# Patient Record
Sex: FEMALE | Race: WHITE | Employment: OTHER | URBAN - METROPOLITAN AREA
[De-identification: names, ages, dates, MRNs, and addresses within clinical notes are randomized per-mention and may not be internally consistent; named-entity substitution may affect disease eponyms.]

---

## 2019-03-06 LAB
CREATININE: 1 MG/DL
POTASSIUM (K+): 4.1 MMOL/L

## 2019-08-19 ENCOUNTER — TELEPHONE (OUTPATIENT)
Dept: PRIMARY CARE CLINIC | Age: 79
End: 2019-08-19

## 2019-08-19 RX ORDER — DICLOFENAC POTASSIUM 50 MG/1
TABLET, FILM COATED ORAL
COMMUNITY
Start: 2017-03-24 | End: 2019-08-26 | Stop reason: SDUPTHER

## 2019-08-22 ENCOUNTER — TELEPHONE (OUTPATIENT)
Dept: PRIMARY CARE CLINIC | Age: 79
End: 2019-08-22

## 2019-08-22 NOTE — TELEPHONE ENCOUNTER
Left message for pt to return call.   Need to review her medications and see which ones she's in need of and verify pharmacy

## 2019-08-26 RX ORDER — AMILORIDE HYDROCHLORIDE AND HYDROCHLOROTHIAZIDE 5; 50 MG/1; MG/1
1 TABLET ORAL DAILY
Qty: 15 TABLET | Refills: 0 | Status: CANCELLED | OUTPATIENT
Start: 2019-08-26

## 2019-08-26 RX ORDER — DICLOFENAC POTASSIUM 50 MG/1
50 TABLET, FILM COATED ORAL 2 TIMES DAILY
Qty: 60 TABLET | Refills: 5 | Status: CANCELLED | OUTPATIENT
Start: 2019-08-26

## 2019-08-27 RX ORDER — DICLOFENAC POTASSIUM 50 MG/1
50 TABLET, FILM COATED ORAL 2 TIMES DAILY
Qty: 60 TABLET | Refills: 5 | Status: SHIPPED | OUTPATIENT
Start: 2019-08-27 | End: 2019-09-12

## 2019-08-27 RX ORDER — AMILORIDE HYDROCHLORIDE AND HYDROCHLOROTHIAZIDE 5; 50 MG/1; MG/1
1 TABLET ORAL DAILY
Qty: 30 TABLET | Refills: 5 | Status: SHIPPED | OUTPATIENT
Start: 2019-08-27 | End: 2019-09-12

## 2019-09-05 LAB
ALBUMIN SERPL-MCNC: 4.1 G/DL
ALP BLD-CCNC: 52 U/L
ALT SERPL-CCNC: 20 U/L
ANION GAP SERPL CALCULATED.3IONS-SCNC: 1.5 MMOL/L
AST SERPL-CCNC: 16 U/L
AVERAGE GLUCOSE: NORMAL
BASOPHILS ABSOLUTE: 0 /ΜL
BASOPHILS RELATIVE PERCENT: 0 %
BILIRUB SERPL-MCNC: 0.7 MG/DL (ref 0.1–1.4)
BUN BLDV-MCNC: 16 MG/DL
CALCIUM SERPL-MCNC: 9.6 MG/DL
CHLORIDE BLD-SCNC: 101 MMOL/L
CHOLESTEROL, TOTAL: 150 MG/DL
CHOLESTEROL/HDL RATIO: NORMAL
CO2: 26 MMOL/L
CREAT SERPL-MCNC: 1 MG/DL
EOSINOPHILS ABSOLUTE: 0.3 /ΜL
EOSINOPHILS RELATIVE PERCENT: 4 %
GFR CALCULATED: 54
GLUCOSE BLD-MCNC: 101 MG/DL
HBA1C MFR BLD: 5.8 %
HCT VFR BLD CALC: 39.6 % (ref 36–46)
HDLC SERPL-MCNC: 56 MG/DL (ref 35–70)
HEMOGLOBIN: 13 G/DL (ref 12–16)
LDL CHOLESTEROL CALCULATED: 65 MG/DL (ref 0–160)
LYMPHOCYTES ABSOLUTE: 1.9 /ΜL
LYMPHOCYTES RELATIVE PERCENT: 31 %
MCH RBC QN AUTO: 29.9 PG
MCHC RBC AUTO-ENTMCNC: 32.8 G/DL
MCV RBC AUTO: 91 FL
MONOCYTES ABSOLUTE: 0.5 /ΜL
MONOCYTES RELATIVE PERCENT: 8 %
NEUTROPHILS ABSOLUTE: 3.5 /ΜL
NEUTROPHILS RELATIVE PERCENT: 57 %
PLATELET # BLD: 215 K/ΜL
PMV BLD AUTO: NORMAL FL
POTASSIUM SERPL-SCNC: 4.1 MMOL/L
RBC # BLD: 4.35 10^6/ΜL
SODIUM BLD-SCNC: 143 MMOL/L
T4 TOTAL: 6.3
TOTAL PROTEIN: 6.9
TRIGL SERPL-MCNC: 144 MG/DL
TSH SERPL DL<=0.05 MIU/L-ACNC: 2.88 UIU/ML
VLDLC SERPL CALC-MCNC: NORMAL MG/DL
WBC # BLD: 6 10^3/ML

## 2019-09-06 RX ORDER — FLURANDRENOLIDE 0.5 MG/G
CREAM TOPICAL 2 TIMES DAILY PRN
COMMUNITY

## 2019-09-06 RX ORDER — SIMVASTATIN 20 MG
20 TABLET ORAL
COMMUNITY
End: 2019-09-12 | Stop reason: SDUPTHER

## 2019-09-06 RX ORDER — METOPROLOL SUCCINATE 50 MG/1
50 TABLET, EXTENDED RELEASE ORAL 2 TIMES DAILY
COMMUNITY
End: 2019-09-12 | Stop reason: SDUPTHER

## 2019-09-06 RX ORDER — LOSARTAN POTASSIUM 50 MG/1
50 TABLET ORAL DAILY
COMMUNITY
End: 2019-09-12 | Stop reason: SDUPTHER

## 2019-09-06 RX ORDER — METHOCARBAMOL 750 MG/1
750 TABLET, FILM COATED ORAL 2 TIMES DAILY
COMMUNITY
End: 2019-09-12 | Stop reason: SDUPTHER

## 2019-09-06 RX ORDER — GABAPENTIN 300 MG/1
300 CAPSULE ORAL 3 TIMES DAILY
COMMUNITY
End: 2019-09-12 | Stop reason: SDUPTHER

## 2019-09-06 RX ORDER — HYDROCODONE BITARTRATE AND ACETAMINOPHEN 5; 325 MG/1; MG/1
1 TABLET ORAL 2 TIMES DAILY PRN
COMMUNITY
End: 2019-09-12 | Stop reason: SDUPTHER

## 2019-09-12 ENCOUNTER — OFFICE VISIT (OUTPATIENT)
Dept: PRIMARY CARE CLINIC | Age: 79
End: 2019-09-12
Payer: MEDICARE

## 2019-09-12 VITALS
HEIGHT: 63 IN | DIASTOLIC BLOOD PRESSURE: 78 MMHG | SYSTOLIC BLOOD PRESSURE: 128 MMHG | TEMPERATURE: 98 F | WEIGHT: 209.2 LBS | BODY MASS INDEX: 37.07 KG/M2

## 2019-09-12 DIAGNOSIS — E78.2 MIXED HYPERLIPIDEMIA: ICD-10-CM

## 2019-09-12 DIAGNOSIS — I10 ESSENTIAL HYPERTENSION: ICD-10-CM

## 2019-09-12 DIAGNOSIS — M51.06 INTERVERTEBRAL LUMBAR DISC DISORDER WITH MYELOPATHY, LUMBAR REGION: Primary | ICD-10-CM

## 2019-09-12 DIAGNOSIS — E03.9 ACQUIRED HYPOTHYROIDISM: ICD-10-CM

## 2019-09-12 DIAGNOSIS — M81.0 AGE-RELATED OSTEOPOROSIS WITHOUT CURRENT PATHOLOGICAL FRACTURE: ICD-10-CM

## 2019-09-12 DIAGNOSIS — E11.9 DIET-CONTROLLED DIABETES MELLITUS (HCC): ICD-10-CM

## 2019-09-12 PROCEDURE — 99214 OFFICE O/P EST MOD 30 MIN: CPT | Performed by: FAMILY MEDICINE

## 2019-09-12 RX ORDER — HYDROCODONE BITARTRATE AND ACETAMINOPHEN 5; 325 MG/1; MG/1
1 TABLET ORAL 2 TIMES DAILY PRN
Qty: 60 TABLET | Refills: 0 | Status: SHIPPED | OUTPATIENT
Start: 2019-09-12 | End: 2019-09-12

## 2019-09-12 RX ORDER — DICLOFENAC POTASSIUM 50 MG/1
50 TABLET, FILM COATED ORAL 2 TIMES DAILY PRN
Qty: 180 TABLET | Refills: 5 | Status: SHIPPED
Start: 2019-09-12 | End: 2020-08-19 | Stop reason: SDUPTHER

## 2019-09-12 RX ORDER — AMILORIDE HYDROCHLORIDE AND HYDROCHLOROTHIAZIDE 5; 50 MG/1; MG/1
1 TABLET ORAL DAILY
Qty: 90 TABLET | Refills: 5 | Status: SHIPPED
Start: 2019-09-12 | End: 2020-08-19 | Stop reason: SDUPTHER

## 2019-09-12 RX ORDER — HYDROCODONE BITARTRATE AND ACETAMINOPHEN 5; 325 MG/1; MG/1
1 TABLET ORAL 2 TIMES DAILY PRN
Qty: 60 TABLET | Refills: 0 | Status: SHIPPED | OUTPATIENT
Start: 2019-09-12 | End: 2020-03-12 | Stop reason: SDUPTHER

## 2019-09-12 RX ORDER — LOSARTAN POTASSIUM 50 MG/1
50 TABLET ORAL DAILY
Qty: 90 TABLET | Refills: 5 | Status: SHIPPED
Start: 2019-09-12 | End: 2020-09-16 | Stop reason: SDUPTHER

## 2019-09-12 RX ORDER — SIMVASTATIN 20 MG
20 TABLET ORAL NIGHTLY
Qty: 90 TABLET | Refills: 5 | Status: SHIPPED
Start: 2019-09-12 | End: 2020-09-16 | Stop reason: SDUPTHER

## 2019-09-12 RX ORDER — METHOCARBAMOL 750 MG/1
750 TABLET, FILM COATED ORAL 2 TIMES DAILY PRN
Qty: 180 TABLET | Refills: 5 | Status: SHIPPED
Start: 2019-09-12 | End: 2021-09-16

## 2019-09-12 RX ORDER — GABAPENTIN 300 MG/1
300 CAPSULE ORAL 3 TIMES DAILY
Qty: 270 CAPSULE | Refills: 3 | Status: SHIPPED
Start: 2019-09-12 | End: 2020-08-19 | Stop reason: SDUPTHER

## 2019-09-12 RX ORDER — METOPROLOL SUCCINATE 50 MG/1
50 TABLET, EXTENDED RELEASE ORAL 2 TIMES DAILY
Qty: 180 TABLET | Refills: 5 | Status: SHIPPED
Start: 2019-09-12 | End: 2020-09-16 | Stop reason: SDUPTHER

## 2019-09-12 ASSESSMENT — ENCOUNTER SYMPTOMS
EYES NEGATIVE: 1
RESPIRATORY NEGATIVE: 1
BACK PAIN: 1
GASTROINTESTINAL NEGATIVE: 1
ALLERGIC/IMMUNOLOGIC NEGATIVE: 1

## 2019-09-12 NOTE — PROGRESS NOTES
Take 1 tablet by mouth 2 times daily as needed for Pain, Disp: 180 tablet, Rfl: 5    flurandrenolide (CORDRAN) 0.05 % CREA, Apply topically 2 times daily as needed, Disp: , Rfl:   Allergies   Allergen Reactions    Penicillins      Social History     Socioeconomic History    Marital status: Unknown     Spouse name: Not on file    Number of children: Not on file    Years of education: Not on file    Highest education level: Not on file   Occupational History    Not on file   Social Needs    Financial resource strain: Not on file    Food insecurity:     Worry: Not on file     Inability: Not on file    Transportation needs:     Medical: Not on file     Non-medical: Not on file   Tobacco Use    Smoking status: Former Smoker     Years: 25.     Last attempt to quit:      Years since quittin.7    Smokeless tobacco: Never Used   Substance and Sexual Activity    Alcohol use: Not on file    Drug use: Not on file    Sexual activity: Not on file   Lifestyle    Physical activity:     Days per week: Not on file     Minutes per session: Not on file    Stress: Not on file   Relationships    Social connections:     Talks on phone: Not on file     Gets together: Not on file     Attends Confucianism service: Not on file     Active member of club or organization: Not on file     Attends meetings of clubs or organizations: Not on file     Relationship status: Not on file    Intimate partner violence:     Fear of current or ex partner: Not on file     Emotionally abused: Not on file     Physically abused: Not on file     Forced sexual activity: Not on file   Other Topics Concern    Not on file   Social History Narrative        HTN    PALPITATIONS    LIPID    R IMER OR  [facility]    L-5 DISC AND CHRONIC JAVED NEURO    SMOKER---QUIT [address] [place] [address] 1940 Page #2    NEG HEART CAHT 2001    PNEUMONIA LLL 10-10    SHINGLES 1-12 LEFT LEG    LIVES for Pain for up to 30 days.  -     HYDROcodone-acetaminophen (NORCO) 5-325 MG per tablet; Take 1 tablet by mouth 2 times daily as needed for Pain for up to 30 days. -     Discontinue: HYDROcodone-acetaminophen (NORCO) 5-325 MG per tablet; Take 1 tablet by mouth 2 times daily as needed for Pain for up to 30 days. -     methocarbamol (ROBAXIN) 750 MG tablet; Take 1 tablet by mouth 2 times daily as needed (spasm)  -     gabapentin (NEURONTIN) 300 MG capsule; Take 1 capsule by mouth 3 times daily for 360 days. -     diclofenac (CATAFLAM) 50 MG tablet; Take 1 tablet by mouth 2 times daily as needed for Pain    Essential hypertension  -     CBC Auto Differential; Future  -     Comprehensive Metabolic Panel; Future  -     Lipid Panel; Future  -     losartan (COZAAR) 50 MG tablet; Take 1 tablet by mouth daily  -     metoprolol succinate (TOPROL XL) 50 MG extended release tablet; Take 1 tablet by mouth 2 times daily  -     aMILoride-HCTZ 5-50 MG TABS; Take 1 tablet by mouth daily    Mixed hyperlipidemia  -     Lipid Panel; Future  -     simvastatin (ZOCOR) 20 MG tablet; Take 1 tablet by mouth nightly 1 po qd --hs--cholesterol    Acquired hypothyroidism  -     T4; Future  -     TSH without Reflex; Future    Age-related osteoporosis without current pathological fracture  -     Vitamin D 25 Hydroxy; Future    Diet-controlled diabetes mellitus ([facility]  -     Hemoglobin A1C; Future        Comments: Low-fat low sugar diet range of motion exercises taught for low back pain. Encourage vitamin D3 2000-day. Offer physical medicine rehab specialist but she declines. Check blood pressure at home periodically. To take her cholesterol medications without food. A great deal of time spent reviewing medications, diet, exercise, social issues. Also reviewing the chart before entering the room with patient and finishing charting after leaving patient's room.  More than half of that time was spent face to face with the patient in counseling

## 2020-03-05 ENCOUNTER — HOSPITAL ENCOUNTER (OUTPATIENT)
Age: 80
Discharge: HOME | End: 2020-03-07
Payer: MEDICARE

## 2020-03-05 LAB
ALBUMIN SERPL-MCNC: 3.9 G/DL (ref 3.5–5.2)
ALP BLD-CCNC: 49 U/L (ref 35–104)
ALT SERPL-CCNC: 15 U/L (ref 0–32)
ANION GAP SERPL CALCULATED.3IONS-SCNC: 13 MMOL/L (ref 7–16)
AST SERPL-CCNC: 14 U/L (ref 0–31)
BASOPHILS ABSOLUTE: 0.02 E9/L (ref 0–0.2)
BASOPHILS RELATIVE PERCENT: 0.4 % (ref 0–2)
BILIRUB SERPL-MCNC: 0.9 MG/DL (ref 0–1.2)
BUN BLDV-MCNC: 14 MG/DL (ref 8–23)
CALCIUM SERPL-MCNC: 9.7 MG/DL (ref 8.6–10.2)
CHLORIDE BLD-SCNC: 100 MMOL/L (ref 98–107)
CHOLESTEROL, TOTAL: 162 MG/DL (ref 0–199)
CO2: 26 MMOL/L (ref 22–29)
CREAT SERPL-MCNC: 0.9 MG/DL (ref 0.5–1)
EOSINOPHILS ABSOLUTE: 0.25 E9/L (ref 0.05–0.5)
EOSINOPHILS RELATIVE PERCENT: 4.5 % (ref 0–6)
GFR AFRICAN AMERICAN: >60
GFR NON-AFRICAN AMERICAN: >60 ML/MIN/1.73
GLUCOSE BLD-MCNC: 109 MG/DL (ref 74–99)
HBA1C MFR BLD: 5.9 % (ref 4–5.6)
HCT VFR BLD CALC: 41.9 % (ref 34–48)
HDLC SERPL-MCNC: 48 MG/DL
HEMOGLOBIN: 13.1 G/DL (ref 11.5–15.5)
IMMATURE GRANULOCYTES #: 0.01 E9/L
IMMATURE GRANULOCYTES %: 0.2 % (ref 0–5)
LDL CHOLESTEROL CALCULATED: 81 MG/DL (ref 0–99)
LYMPHOCYTES ABSOLUTE: 2 E9/L (ref 1.5–4)
LYMPHOCYTES RELATIVE PERCENT: 35.8 % (ref 20–42)
MCH RBC QN AUTO: 29.6 PG (ref 26–35)
MCHC RBC AUTO-ENTMCNC: 31.3 % (ref 32–34.5)
MCV RBC AUTO: 94.6 FL (ref 80–99.9)
MONOCYTES ABSOLUTE: 0.42 E9/L (ref 0.1–0.95)
MONOCYTES RELATIVE PERCENT: 7.5 % (ref 2–12)
NEUTROPHILS ABSOLUTE: 2.89 E9/L (ref 1.8–7.3)
NEUTROPHILS RELATIVE PERCENT: 51.6 % (ref 43–80)
PDW BLD-RTO: 12.8 FL (ref 11.5–15)
PLATELET # BLD: 190 E9/L (ref 130–450)
PMV BLD AUTO: 14.4 FL (ref 7–12)
POTASSIUM SERPL-SCNC: 3.9 MMOL/L (ref 3.5–5)
RBC # BLD: 4.43 E12/L (ref 3.5–5.5)
SODIUM BLD-SCNC: 139 MMOL/L (ref 132–146)
T4 TOTAL: 5.9 MCG/DL (ref 4.5–11.7)
TOTAL PROTEIN: 7.2 G/DL (ref 6.4–8.3)
TRIGL SERPL-MCNC: 167 MG/DL (ref 0–149)
TSH SERPL DL<=0.05 MIU/L-ACNC: 2.85 UIU/ML (ref 0.27–4.2)
VITAMIN D 25-HYDROXY: 27 NG/ML (ref 30–100)
VLDLC SERPL CALC-MCNC: 33 MG/DL
WBC # BLD: 5.6 E9/L (ref 4.5–11.5)

## 2020-03-05 PROCEDURE — 82306 VITAMIN D 25 HYDROXY: CPT

## 2020-03-05 PROCEDURE — 84436 ASSAY OF TOTAL THYROXINE: CPT

## 2020-03-05 PROCEDURE — 85025 COMPLETE CBC W/AUTO DIFF WBC: CPT

## 2020-03-05 PROCEDURE — 83036 HEMOGLOBIN GLYCOSYLATED A1C: CPT

## 2020-03-05 PROCEDURE — 80053 COMPREHEN METABOLIC PANEL: CPT

## 2020-03-05 PROCEDURE — 80061 LIPID PANEL: CPT

## 2020-03-05 PROCEDURE — 84443 ASSAY THYROID STIM HORMONE: CPT

## 2020-03-05 PROCEDURE — 36415 COLL VENOUS BLD VENIPUNCTURE: CPT

## 2020-03-12 ENCOUNTER — OFFICE VISIT (OUTPATIENT)
Dept: PRIMARY CARE CLINIC | Age: 80
End: 2020-03-12
Payer: MEDICARE

## 2020-03-12 PROCEDURE — 99214 OFFICE O/P EST MOD 30 MIN: CPT | Performed by: FAMILY MEDICINE

## 2020-03-12 PROCEDURE — 4040F PNEUMOC VAC/ADMIN/RCVD: CPT | Performed by: FAMILY MEDICINE

## 2020-03-12 PROCEDURE — 1090F PRES/ABSN URINE INCON ASSESS: CPT | Performed by: FAMILY MEDICINE

## 2020-03-12 PROCEDURE — G8400 PT W/DXA NO RESULTS DOC: HCPCS | Performed by: FAMILY MEDICINE

## 2020-03-12 PROCEDURE — 1123F ACP DISCUSS/DSCN MKR DOCD: CPT | Performed by: FAMILY MEDICINE

## 2020-03-12 PROCEDURE — G8484 FLU IMMUNIZE NO ADMIN: HCPCS | Performed by: FAMILY MEDICINE

## 2020-03-12 PROCEDURE — G8417 CALC BMI ABV UP PARAM F/U: HCPCS | Performed by: FAMILY MEDICINE

## 2020-03-12 PROCEDURE — 1036F TOBACCO NON-USER: CPT | Performed by: FAMILY MEDICINE

## 2020-03-12 PROCEDURE — G8427 DOCREV CUR MEDS BY ELIG CLIN: HCPCS | Performed by: FAMILY MEDICINE

## 2020-03-12 RX ORDER — HYDROCODONE BITARTRATE AND ACETAMINOPHEN 5; 325 MG/1; MG/1
1 TABLET ORAL DAILY PRN
Qty: 30 TABLET | Refills: 0 | Status: SHIPPED | OUTPATIENT
Start: 2020-03-12 | End: 2020-04-11

## 2020-03-13 VITALS
DIASTOLIC BLOOD PRESSURE: 84 MMHG | TEMPERATURE: 98.1 F | WEIGHT: 215 LBS | SYSTOLIC BLOOD PRESSURE: 128 MMHG | HEIGHT: 63 IN | BODY MASS INDEX: 38.09 KG/M2

## 2020-03-13 PROBLEM — M15.0 PRIMARY OSTEOARTHRITIS INVOLVING MULTIPLE JOINTS: Chronic | Status: ACTIVE | Noted: 2020-03-13

## 2020-03-13 PROBLEM — E11.9 DIET-CONTROLLED DIABETES MELLITUS (HCC): Chronic | Status: RESOLVED | Noted: 2019-09-12 | Resolved: 2020-03-13

## 2020-03-13 PROBLEM — M15.9 PRIMARY OSTEOARTHRITIS INVOLVING MULTIPLE JOINTS: Chronic | Status: ACTIVE | Noted: 2020-03-13

## 2020-03-13 PROBLEM — M15.9 PRIMARY OSTEOARTHRITIS INVOLVING MULTIPLE JOINTS: Status: ACTIVE | Noted: 2020-03-13

## 2020-03-13 PROBLEM — R73.03 PRE-DIABETES: Chronic | Status: ACTIVE | Noted: 2020-03-13

## 2020-03-13 PROBLEM — R73.03 PRE-DIABETES: Status: ACTIVE | Noted: 2020-03-13

## 2020-03-13 PROBLEM — M15.0 PRIMARY OSTEOARTHRITIS INVOLVING MULTIPLE JOINTS: Status: ACTIVE | Noted: 2020-03-13

## 2020-03-13 ASSESSMENT — PATIENT HEALTH QUESTIONNAIRE - PHQ9
SUM OF ALL RESPONSES TO PHQ9 QUESTIONS 1 & 2: 0
SUM OF ALL RESPONSES TO PHQ QUESTIONS 1-9: 0
2. FEELING DOWN, DEPRESSED OR HOPELESS: 0
SUM OF ALL RESPONSES TO PHQ QUESTIONS 1-9: 0
1. LITTLE INTEREST OR PLEASURE IN DOING THINGS: 0

## 2020-03-13 ASSESSMENT — ENCOUNTER SYMPTOMS
ALLERGIC/IMMUNOLOGIC NEGATIVE: 1
GASTROINTESTINAL NEGATIVE: 1
EYES NEGATIVE: 1
BACK PAIN: 1
RESPIRATORY NEGATIVE: 1

## 2020-05-04 ENCOUNTER — VIRTUAL VISIT (OUTPATIENT)
Dept: PRIMARY CARE CLINIC | Age: 80
End: 2020-05-04
Payer: MEDICARE

## 2020-05-04 PROCEDURE — 99441 PR PHYS/QHP TELEPHONE EVALUATION 5-10 MIN: CPT | Performed by: FAMILY MEDICINE

## 2020-05-04 RX ORDER — DOXYCYCLINE HYCLATE 100 MG
100 TABLET ORAL 2 TIMES DAILY
Qty: 20 TABLET | Refills: 0 | Status: SHIPPED | OUTPATIENT
Start: 2020-05-04 | End: 2020-05-14

## 2020-05-04 ASSESSMENT — PATIENT HEALTH QUESTIONNAIRE - PHQ9
2. FEELING DOWN, DEPRESSED OR HOPELESS: 0
SUM OF ALL RESPONSES TO PHQ QUESTIONS 1-9: 0
SUM OF ALL RESPONSES TO PHQ QUESTIONS 1-9: 0

## 2020-05-08 ENCOUNTER — TELEPHONE (OUTPATIENT)
Dept: PRIMARY CARE CLINIC | Age: 80
End: 2020-05-08

## 2020-05-09 ENCOUNTER — OFFICE VISIT (OUTPATIENT)
Dept: PRIMARY CARE CLINIC | Age: 80
End: 2020-05-09
Payer: MEDICARE

## 2020-05-09 VITALS
WEIGHT: 208 LBS | DIASTOLIC BLOOD PRESSURE: 80 MMHG | SYSTOLIC BLOOD PRESSURE: 130 MMHG | BODY MASS INDEX: 38.28 KG/M2 | HEART RATE: 69 BPM | OXYGEN SATURATION: 97 % | TEMPERATURE: 98.1 F | HEIGHT: 62 IN

## 2020-05-09 PROCEDURE — 99213 OFFICE O/P EST LOW 20 MIN: CPT | Performed by: NURSE PRACTITIONER

## 2020-05-09 PROCEDURE — G8427 DOCREV CUR MEDS BY ELIG CLIN: HCPCS | Performed by: NURSE PRACTITIONER

## 2020-05-09 PROCEDURE — 1036F TOBACCO NON-USER: CPT | Performed by: NURSE PRACTITIONER

## 2020-05-09 PROCEDURE — 1090F PRES/ABSN URINE INCON ASSESS: CPT | Performed by: NURSE PRACTITIONER

## 2020-05-09 PROCEDURE — G8417 CALC BMI ABV UP PARAM F/U: HCPCS | Performed by: NURSE PRACTITIONER

## 2020-05-09 PROCEDURE — 4040F PNEUMOC VAC/ADMIN/RCVD: CPT | Performed by: NURSE PRACTITIONER

## 2020-05-09 PROCEDURE — 1123F ACP DISCUSS/DSCN MKR DOCD: CPT | Performed by: NURSE PRACTITIONER

## 2020-05-09 PROCEDURE — G8400 PT W/DXA NO RESULTS DOC: HCPCS | Performed by: NURSE PRACTITIONER

## 2020-05-09 SDOH — HEALTH STABILITY: MENTAL HEALTH: HOW OFTEN DO YOU HAVE A DRINK CONTAINING ALCOHOL?: NEVER

## 2020-05-09 NOTE — PATIENT INSTRUCTIONS
Patient Education        Coron[facility] Care Instructions  Overview  The coronavirus disease (COVID-19) is caused by a virus. It causes a fever, a cough, and shortness of breath. It mainly spreads person-to-person through droplets from coughing and sneezing. The virus also can spread when people are in close contact with someone who is infected. Most people have mild symptoms and can take care of themselves at home. If their symptoms get worse, they may need care in a hospital. There is no medicine to fight the virus. It's important to not spread the virus to others. If you have COVID-19, wear a face cover anytime you are around other people. You need to isolate yourself while you are sick. Your doctor will tell you when you no longer need to be isolated. Leave your home only if you need to get medical care. Follow-up care is a key part of your treatment and safety. Be sure to make and go to all appointments, and call your doctor if you are having problems. It's also a good idea to know your test results and keep a list of the medicines you take. How can you care for yourself at home? · Get extra rest. It can help you feel better. · Drink plenty of fluids. This helps replace fluids lost from fever. Fluids also help ease a scratchy throat. Water, soup, fruit juice, and hot tea with lemon are good choices. · Take acetaminophen (such as Tylenol) to reduce a fever. It may also help with muscle aches. Read and follow all instructions on the label. · Sponge your body with lukewarm water to help with fever. Don't use cold water or ice. · Use petroleum jelly on sore skin. This can help if the skin around your nose and lips becomes sore from rubbing a lot with tissues. Tips for isolation  · Wear a cloth face cover when you are around other people. It can help stop the spread of the virus when you cough or sneeze. · Limit contact with people in your home.  If possible, stay in a separate bedroom and use a separate bathroom. · Avoid contact with pets and other animals. · Cover your mouth and nose with a tissue when you cough or sneeze. Then throw it in the trash right away. · Wash your hands often, especially after you cough or sneeze. Use soap and water, and scrub for at least 20 seconds. If soap and water aren't available, use an alcohol-based hand . · Don't share personal household items. These include bedding, towels, cups and glasses, and eating utensils. · Clean and disinfect your home every day. Use household  and disinfectant wipes or sprays. Take special care to clean things that you grab with your hands. These include doorknobs, remote controls, phones, and handles on your refrigerator and microwave. And don't forget countertops, tabletops, bathrooms, and computer keyboards. When should you call for help? [id number] anytime you think you may need emergency care. For example, call if you have life-threatening symptoms, such as:  · You have severe trouble breathing. (You can't talk at all.)  · You have constant chest pain or pressure. · You are severely dizzy or lightheaded. · You are confused or can't think clearly. · Your face and lips have a blue color. · You pass out (lose consciousness) or are very hard to wake up. Call your doctor now or seek immediate medical care if:  · You have moderate trouble breathing. (You can't speak a full sentence.)  · You are coughing up blood (more than about 1 teaspoon). · You have signs of low blood pressure. These include feeling lightheaded; being too weak to stand; and having cold, pale, clammy skin. Watch closely for changes in your health, and be sure to contact your doctor if:  · Your symptoms get worse. · You are not getting better as expected. Call before you go to the doctor's office. Follow their instructions. And wear a cloth face cover.   Current as of: April 24, 2020               Content Version: 12.4  © 0294-3771 [employer],

## 2020-08-19 RX ORDER — DICLOFENAC POTASSIUM 50 MG/1
50 TABLET, FILM COATED ORAL 2 TIMES DAILY PRN
Qty: 180 TABLET | Refills: 3 | Status: SHIPPED
Start: 2020-08-19 | End: 2020-09-16 | Stop reason: SDUPTHER

## 2020-08-19 RX ORDER — GABAPENTIN 300 MG/1
300 CAPSULE ORAL 3 TIMES DAILY
Qty: 270 CAPSULE | Refills: 3 | Status: SHIPPED
Start: 2020-08-19 | End: 2020-09-16 | Stop reason: SDUPTHER

## 2020-08-19 RX ORDER — AMILORIDE HYDROCHLORIDE AND HYDROCHLOROTHIAZIDE 5; 50 MG/1; MG/1
1 TABLET ORAL DAILY
Qty: 90 TABLET | Refills: 3 | Status: SHIPPED
Start: 2020-08-19 | End: 2020-09-16 | Stop reason: SDUPTHER

## 2020-09-10 ENCOUNTER — HOSPITAL ENCOUNTER (OUTPATIENT)
Age: 80
Discharge: HOME | End: 2020-09-12
Payer: MEDICARE

## 2020-09-10 LAB
ALBUMIN SERPL-MCNC: 3.9 G/DL (ref 3.5–5.2)
ALP BLD-CCNC: 59 U/L (ref 35–104)
ALT SERPL-CCNC: 13 U/L (ref 0–32)
ANION GAP SERPL CALCULATED.3IONS-SCNC: 12 MMOL/L (ref 7–16)
AST SERPL-CCNC: 13 U/L (ref 0–31)
BASOPHILS ABSOLUTE: 0.04 E9/L (ref 0–0.2)
BASOPHILS RELATIVE PERCENT: 0.6 % (ref 0–2)
BILIRUB SERPL-MCNC: 0.9 MG/DL (ref 0–1.2)
BUN BLDV-MCNC: 12 MG/DL (ref 8–23)
CALCIUM SERPL-MCNC: 9.5 MG/DL (ref 8.6–10.2)
CHLORIDE BLD-SCNC: 97 MMOL/L (ref 98–107)
CHOLESTEROL, TOTAL: 147 MG/DL (ref 0–199)
CO2: 31 MMOL/L (ref 22–29)
CREAT SERPL-MCNC: 0.9 MG/DL (ref 0.5–1)
EOSINOPHILS ABSOLUTE: 0.32 E9/L (ref 0.05–0.5)
EOSINOPHILS RELATIVE PERCENT: 4.7 % (ref 0–6)
GFR AFRICAN AMERICAN: >60
GFR NON-AFRICAN AMERICAN: >60 ML/MIN/1.73
GLUCOSE BLD-MCNC: 99 MG/DL (ref 74–99)
HBA1C MFR BLD: 5.9 % (ref 4–5.6)
HCT VFR BLD CALC: 42.2 % (ref 34–48)
HDLC SERPL-MCNC: 49 MG/DL
HEMOGLOBIN: 13.7 G/DL (ref 11.5–15.5)
IMMATURE GRANULOCYTES #: 0.03 E9/L
IMMATURE GRANULOCYTES %: 0.4 % (ref 0–5)
LDL CHOLESTEROL CALCULATED: 78 MG/DL (ref 0–99)
LYMPHOCYTES ABSOLUTE: 1.89 E9/L (ref 1.5–4)
LYMPHOCYTES RELATIVE PERCENT: 27.6 % (ref 20–42)
MCH RBC QN AUTO: 30 PG (ref 26–35)
MCHC RBC AUTO-ENTMCNC: 32.5 % (ref 32–34.5)
MCV RBC AUTO: 92.5 FL (ref 80–99.9)
MONOCYTES ABSOLUTE: 0.53 E9/L (ref 0.1–0.95)
MONOCYTES RELATIVE PERCENT: 7.7 % (ref 2–12)
NEUTROPHILS ABSOLUTE: 4.04 E9/L (ref 1.8–7.3)
NEUTROPHILS RELATIVE PERCENT: 59 % (ref 43–80)
PDW BLD-RTO: 12.7 FL (ref 11.5–15)
PLATELET # BLD: 197 E9/L (ref 130–450)
PMV BLD AUTO: 13.7 FL (ref 7–12)
POTASSIUM SERPL-SCNC: 3.6 MMOL/L (ref 3.5–5)
RBC # BLD: 4.56 E12/L (ref 3.5–5.5)
SODIUM BLD-SCNC: 140 MMOL/L (ref 132–146)
TOTAL PROTEIN: 7.2 G/DL (ref 6.4–8.3)
TRIGL SERPL-MCNC: 98 MG/DL (ref 0–149)
URIC ACID, SERUM: 6.6 MG/DL (ref 2.4–5.7)
VLDLC SERPL CALC-MCNC: 20 MG/DL
WBC # BLD: 6.9 E9/L (ref 4.5–11.5)

## 2020-09-10 PROCEDURE — 36415 COLL VENOUS BLD VENIPUNCTURE: CPT

## 2020-09-10 PROCEDURE — 85025 COMPLETE CBC W/AUTO DIFF WBC: CPT

## 2020-09-10 PROCEDURE — 80053 COMPREHEN METABOLIC PANEL: CPT

## 2020-09-10 PROCEDURE — 84550 ASSAY OF BLOOD/URIC ACID: CPT

## 2020-09-10 PROCEDURE — 83036 HEMOGLOBIN GLYCOSYLATED A1C: CPT

## 2020-09-10 PROCEDURE — 80061 LIPID PANEL: CPT

## 2020-09-16 ENCOUNTER — OFFICE VISIT (OUTPATIENT)
Dept: PRIMARY CARE CLINIC | Age: 80
End: 2020-09-16
Payer: MEDICARE

## 2020-09-16 VITALS
BODY MASS INDEX: 39.75 KG/M2 | WEIGHT: 216 LBS | SYSTOLIC BLOOD PRESSURE: 134 MMHG | DIASTOLIC BLOOD PRESSURE: 82 MMHG | TEMPERATURE: 98.6 F | HEIGHT: 62 IN

## 2020-09-16 PROCEDURE — 4040F PNEUMOC VAC/ADMIN/RCVD: CPT | Performed by: FAMILY MEDICINE

## 2020-09-16 PROCEDURE — 1123F ACP DISCUSS/DSCN MKR DOCD: CPT | Performed by: FAMILY MEDICINE

## 2020-09-16 PROCEDURE — G0438 PPPS, INITIAL VISIT: HCPCS | Performed by: FAMILY MEDICINE

## 2020-09-16 RX ORDER — DICLOFENAC POTASSIUM 50 MG/1
50 TABLET, FILM COATED ORAL 2 TIMES DAILY PRN
Qty: 180 TABLET | Refills: 3 | Status: SHIPPED
Start: 2020-09-16 | End: 2021-03-16

## 2020-09-16 RX ORDER — SIMVASTATIN 20 MG
20 TABLET ORAL NIGHTLY
Qty: 90 TABLET | Refills: 5 | Status: SHIPPED
Start: 2020-09-16 | End: 2021-09-16 | Stop reason: SDUPTHER

## 2020-09-16 RX ORDER — LOSARTAN POTASSIUM 50 MG/1
50 TABLET ORAL DAILY
Qty: 90 TABLET | Refills: 5 | Status: SHIPPED
Start: 2020-09-16 | End: 2021-09-16 | Stop reason: SDUPTHER

## 2020-09-16 RX ORDER — AMILORIDE HYDROCHLORIDE AND HYDROCHLOROTHIAZIDE 5; 50 MG/1; MG/1
1 TABLET ORAL DAILY
Qty: 90 TABLET | Refills: 3 | Status: SHIPPED
Start: 2020-09-16 | End: 2021-09-16 | Stop reason: SDUPTHER

## 2020-09-16 RX ORDER — GABAPENTIN 300 MG/1
300 CAPSULE ORAL 3 TIMES DAILY
Qty: 270 CAPSULE | Refills: 3 | Status: SHIPPED
Start: 2020-09-16 | End: 2021-09-16 | Stop reason: SDUPTHER

## 2020-09-16 RX ORDER — METOPROLOL SUCCINATE 50 MG/1
50 TABLET, EXTENDED RELEASE ORAL 2 TIMES DAILY
Qty: 180 TABLET | Refills: 5 | Status: SHIPPED
Start: 2020-09-16 | End: 2021-09-16 | Stop reason: SDUPTHER

## 2020-09-16 ASSESSMENT — PATIENT HEALTH QUESTIONNAIRE - PHQ9
SUM OF ALL RESPONSES TO PHQ9 QUESTIONS 1 & 2: 0
2. FEELING DOWN, DEPRESSED OR HOPELESS: 0
SUM OF ALL RESPONSES TO PHQ QUESTIONS 1-9: 0
SUM OF ALL RESPONSES TO PHQ QUESTIONS 1-9: 0
1. LITTLE INTEREST OR PLEASURE IN DOING THINGS: 0

## 2020-09-16 ASSESSMENT — LIFESTYLE VARIABLES
HOW OFTEN DO YOU HAVE A DRINK CONTAINING ALCOHOL: NEVER
AUDIT-C TOTAL SCORE: INCOMPLETE
AUDIT TOTAL SCORE: INCOMPLETE
HOW OFTEN DO YOU HAVE A DRINK CONTAINING ALCOHOL: 0

## 2020-09-16 NOTE — PROGRESS NOTES
Medicare Annual Wellness Visit  Name: Pipe Estrada Date: 2020   MRN: [id number] Sex: Female   Age: 68 y.o. Ethnicity: Non-/Non    : 1940 Race: Lynette Qiu is here for Medicare AWV and Discuss Labs (6m)    Feel ok     wtup  One lb  May   Sick   Bu tnot tested  Lucy neg   ghb same         Screenings for behavioral, psychosocial and functional/safety risks, and cognitive dysfunction are all negative except as indicated below. These results, as well as other patient data from the [address] form, are documented in Flowsheets linked to this Encounter. Allergies   Allergen Reactions    Penicillins          Prior to Visit Medications    Medication Sig Taking? Authorizing Provider   aMILoride-HCTZ 5-50 MG TABS Take 1 tablet by mouth daily Yes Edmond Mg DO   simvastatin (ZOCOR) 20 MG tablet Take 1 tablet by mouth nightly 1 po qd --hs--cholesterol Yes Edmond Mg DO   gabapentin (NEURONTIN) 300 MG capsule Take 1 capsule by mouth 3 times daily for 360 days.  Yes Edmond Mg DO   metoprolol succinate (TOPROL XL) 50 MG extended release tablet Take 1 tablet by mouth 2 times daily Yes Edmond Mg DO   losartan (COZAAR) 50 MG tablet Take 1 tablet by mouth daily Yes Edmond Mg DO   diclofenac (CATAFLAM) 50 MG tablet Take 1 tablet by mouth 2 times daily as needed for Pain Yes Edmond Mg DO   methocarbamol (ROBAXIN) 750 MG tablet Take 1 tablet by mouth 2 times daily as needed (spasm)  Edmond Mg DO   flurandrenolide (CORDRAN) 0.05 % CREA Apply topically 2 times daily as needed  Historical Provider, MD         Past Medical History:   Diagnosis Date    [facility] disease ([facility]     Diet-controlled diabetes mellitus ([facility] 2019    DJD (degenerative joint disease)     multiple joints    Heart disease     benign hypertensive 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 No family history on file. CareTeam (Including outside providers/suppliers regularly involved in providing care):   Patient Care Team:  Anuel Nobles DO as PCP - General (Family Medicine)  Anuel Nobles [facility]    Wt Readings from Last 3 Encounters:   09/16/20 216 lb (98 kg)   05/09/20 208 lb (94.3 kg)   03/12/20 215 lb (97.5 kg)     Vitals:    09/16/20 1258   BP: 134/82   Temp: 98.6 °F (37 °C)   Weight: 216 lb (98 kg)   Height: 5' 1.5\" (1.562 m)     Body mass index is 40.15 kg/m². Based upon direct observation of the patient, evaluation of cognition reveals recent and remote memory intact.     General Appearance: alert and oriented to person, place and time, well developed and well- nourished, in no acute distress  Skin: warm and dry, no rash or erythema  Head: normocephalic and atraumatic  Eyes: pupils equal, round, and reactive to light, extraocular eye movements intact, conjunctivae normal  ENT: tympanic membrane, external ear and ear canal normal bilaterally, nose without deformity, nasal mucosa and turbinates normal without polyps  Neck: supple and non-tender without mass, no thyromegaly or thyroid nodules, no cervical lymphadenopathy  Pulmonary/Chest: clear to auscultation bilaterally- no wheezes, rales or rhonchi, normal air movement, no respiratory distress  Cardiovascular: normal rate, regular rhythm, normal S1 and S2, no murmurs, rubs, clicks, or gallops, distal pulses intact, no carotid bruits  Abdomen: soft, non-tender, non-distended, normal bowel sounds, no masses or organomegaly  Extremities: no cyanosis, clubbing or edema  Musculoskeletal: normal range of motion, no joint swelling, deformity or tenderness  Neurologic: reflexes normal and symmetric, no cranial nerve deficit, gait, coordination and speech normal    Patient's complete Health Risk Assessment and screening values have been reviewed and are found in Flowsheets. The following problems were reviewed today and where indicated follow up appointments were made and/or referrals ordered. Positive Risk Factor Screenings with Interventions:     Health Habits/Nutrition:  Health Habits/Nutrition  Do you exercise for at least 20 minutes 2-3 times per week?: Yes  Have you lost any weight without trying in the past 3 months?: No  Do you eat fewer than 2 meals per day?: No  Have you seen a dentist within the past year?: (!) No  Body mass index is 40.15 kg/m². Health Habits/Nutrition Interventions:  · none    Personalized Preventive Plan   Current Health Maintenance Status  Immunization History   Administered Date(s) Administered    Influenza Virus Vaccine 09/20/2012, 09/18/2013, 09/29/2014, 12/02/2015, 09/14/2016, 09/13/2017        Health Maintenance   Topic Date Due    DTaP/Tdap/Td vaccine (1 - Tdap) 04/07/1961    Shingles Vaccine (1 of 2) 04/07/1992    Pneumococcal 65+ years Vaccine (1 of 1 - PPSV23) 04/07/2007    Annual Wellness Visit (AWV)  06/23/2019    Flu vaccine (1) 09/01/2020    TSH testing  03/05/2021    Lipid screen  09/10/2021    Potassium monitoring  09/10/2021    Creatinine monitoring  09/10/2021    DEXA (modify frequency per FRAX score)  Completed    Hepatitis A vaccine  Aged Out    Hib vaccine  Aged Out    Meningococcal (ACWY) vaccine  Aged Out     Recommendations for Qloud Due: see orders and patient instructions/AVS.  . Recommended screening schedule for the next 5-10 years is provided to the patient in written form: see Patient Pratibha Carter was seen today for medicare awv and discuss labs.     Diagnoses and all orders for this visit:    Routine general medical examination at a health care facility    Essential hypertension  -     aMILoride-HCTZ 5-50 MG TABS; Take 1 tablet by mouth daily  -     metoprolol succinate (TOPROL XL) 50 MG extended release tablet; Take 1 tablet by mouth 2 times daily  -     losartan (COZAAR) 50 MG tablet; Take 1 tablet by mouth daily    Mixed hyperlipidemia  -     simvastatin (ZOCOR) 20 MG tablet; Take 1 tablet by mouth nightly 1 po qd --hs--cholesterol    Intervertebral lumbar disc disorder with myelopathy, lumbar region  -     gabapentin (NEURONTIN) 300 MG capsule; Take 1 capsule by mouth 3 times daily for 360 days. -     diclofenac (CATAFLAM) 50 MG tablet; Take 1 tablet by mouth 2 times daily as needed for Pain    Need for prophylactic vaccination against Streptococcus pneumoniae (pneumococcus)  -     Pneumococcal polysaccharide vaccine 23-valent PPSV23            Die t   exer hm ssieus    lsoe wt   wal more      Update shot  A great deal of time spent reviewing medications, diet, exercise, social issues. Also reviewing the chart before entering the room with patient and finishing charting after leaving patient's room. More than half of that time was spent face to face with the patient in counseling and coordinating care.

## 2020-09-16 NOTE — PATIENT INSTRUCTIONS
Personalized Preventive Plan for Iveth Ortega - 9/16/2020  Medicare offers a range of preventive health benefits. Some of the tests and screenings are paid in full while other may be subject to a deductible, co-insurance, and/or copay. Some of these benefits include a comprehensive review of your medical history including lifestyle, illnesses that may run in your family, and various assessments and screenings as appropriate. After reviewing your medical record and screening and assessments performed today your provider may have ordered immunizations, labs, imaging, and/or referrals for you. A list of these orders (if applicable) as well as your Preventive Care list are included within your After Visit Summary for your review. Other Preventive Recommendations:    · A preventive eye exam performed by an eye specialist is recommended every 1-2 years to screen for glaucoma; cataracts, macular degeneration, and other eye disorders. · A preventive dental visit is recommended every 6 months. · Try to get at least 150 minutes of exercise per week or 10,000 steps per day on a pedometer . · Order or download the FREE \"Exercise & Physical Activity: Your Everyday Guide\" from [employer]. Call 7-559.803.2979 or search [employer] online. · You need 4678-5429 mg of calcium and 6894-9128 IU of vitamin D per day. It is possible to meet your calcium requirement with diet alone, but a vitamin D supplement is usually necessary to meet this goal.  · When exposed to the sun, use a sunscreen that protects against both UVA and UVB radiation with an SPF of 30 or greater. Reapply every 2 to 3 hours or after sweating, drying off with a towel, or swimming. · Always wear a seat belt when traveling in a car. Always wear a helmet when riding a bicycle or motorcycle.

## 2020-09-18 ENCOUNTER — NURSE ONLY (OUTPATIENT)
Dept: PRIMARY CARE CLINIC | Age: 80
End: 2020-09-18
Payer: MEDICARE

## 2020-09-18 PROCEDURE — G0009 ADMIN PNEUMOCOCCAL VACCINE: HCPCS | Performed by: FAMILY MEDICINE

## 2020-09-18 PROCEDURE — 90732 PPSV23 VACC 2 YRS+ SUBQ/IM: CPT | Performed by: FAMILY MEDICINE

## 2020-11-04 ENCOUNTER — OFFICE VISIT (OUTPATIENT)
Dept: PRIMARY CARE CLINIC | Age: 80
End: 2020-11-04
Payer: MEDICARE

## 2020-11-04 VITALS
BODY MASS INDEX: 40.15 KG/M2 | SYSTOLIC BLOOD PRESSURE: 132 MMHG | RESPIRATION RATE: 16 BRPM | WEIGHT: 216 LBS | DIASTOLIC BLOOD PRESSURE: 82 MMHG | TEMPERATURE: 97 F

## 2020-11-04 PROCEDURE — 99213 OFFICE O/P EST LOW 20 MIN: CPT | Performed by: FAMILY MEDICINE

## 2020-11-04 PROCEDURE — 4040F PNEUMOC VAC/ADMIN/RCVD: CPT | Performed by: FAMILY MEDICINE

## 2020-11-04 PROCEDURE — G8427 DOCREV CUR MEDS BY ELIG CLIN: HCPCS | Performed by: FAMILY MEDICINE

## 2020-11-04 PROCEDURE — 1036F TOBACCO NON-USER: CPT | Performed by: FAMILY MEDICINE

## 2020-11-04 PROCEDURE — 96372 THER/PROPH/DIAG INJ SC/IM: CPT | Performed by: FAMILY MEDICINE

## 2020-11-04 PROCEDURE — G8400 PT W/DXA NO RESULTS DOC: HCPCS | Performed by: FAMILY MEDICINE

## 2020-11-04 PROCEDURE — 1123F ACP DISCUSS/DSCN MKR DOCD: CPT | Performed by: FAMILY MEDICINE

## 2020-11-04 PROCEDURE — G8417 CALC BMI ABV UP PARAM F/U: HCPCS | Performed by: FAMILY MEDICINE

## 2020-11-04 PROCEDURE — G8482 FLU IMMUNIZE ORDER/ADMIN: HCPCS | Performed by: FAMILY MEDICINE

## 2020-11-04 PROCEDURE — 1090F PRES/ABSN URINE INCON ASSESS: CPT | Performed by: FAMILY MEDICINE

## 2020-11-04 RX ORDER — PREDNISONE 10 MG/1
TABLET ORAL
Qty: 18 TABLET | Refills: 0 | Status: SHIPPED
Start: 2020-11-04 | End: 2021-03-16

## 2020-11-04 RX ORDER — METHYLPREDNISOLONE ACETATE 40 MG/ML
40 INJECTION, SUSPENSION INTRA-ARTICULAR; INTRALESIONAL; INTRAMUSCULAR; SOFT TISSUE ONCE
Status: COMPLETED | OUTPATIENT
Start: 2020-11-04 | End: 2020-11-04

## 2020-11-04 RX ADMIN — METHYLPREDNISOLONE ACETATE 40 MG: 40 INJECTION, SUSPENSION INTRA-ARTICULAR; INTRALESIONAL; INTRAMUSCULAR; SOFT TISSUE at 11:43

## 2020-11-04 ASSESSMENT — PATIENT HEALTH QUESTIONNAIRE - PHQ9
SUM OF ALL RESPONSES TO PHQ9 QUESTIONS 1 & 2: 0
SUM OF ALL RESPONSES TO PHQ QUESTIONS 1-9: 0
2. FEELING DOWN, DEPRESSED OR HOPELESS: 0
SUM OF ALL RESPONSES TO PHQ QUESTIONS 1-9: 0
SUM OF ALL RESPONSES TO PHQ QUESTIONS 1-9: 0
1. LITTLE INTEREST OR PLEASURE IN DOING THINGS: 0

## 2020-11-04 ASSESSMENT — ENCOUNTER SYMPTOMS
BACK PAIN: 1
ALLERGIC/IMMUNOLOGIC NEGATIVE: 1
EYES NEGATIVE: 1
GASTROINTESTINAL NEGATIVE: 1
RESPIRATORY NEGATIVE: 1

## 2020-11-04 NOTE — PROGRESS NOTES
20  Name: Shakir Qiu    : 1940    Sex: female    Age: 68 y.o. Subjective:  Chief Complaint: Patient is here for back an dleg pain     Severe righ tlow bak pain with rad to right buttock   sa   yarab     an dkalfas  2015  And   offerred major surgery and pt delcined  signif pain with walking      Review of Systems   Constitutional: Negative. HENT: Negative. Eyes: Negative. Respiratory: Negative. Cardiovascular: Negative. Gastrointestinal: Negative. Endocrine: Negative. Genitourinary: Negative. Musculoskeletal: Positive for back pain. Skin: Negative. Allergic/Immunologic: Negative. Neurological: Negative. Hematological: Negative. Psychiatric/Behavioral: Negative. Current Outpatient Medications:     predniSONE (DELTASONE) 10 MG tablet, ONE TID FOR THREE DAYS, ONE BID FOR THREE DAYS, ONE QD FOR THREE DAYS   FOOD, Disp: 18 tablet, Rfl: 0    aMILoride-HCTZ 5-50 MG TABS, Take 1 tablet by mouth daily, Disp: 90 tablet, Rfl: 3    simvastatin (ZOCOR) 20 MG tablet, Take 1 tablet by mouth nightly 1 po qd --hs--cholesterol, Disp: 90 tablet, Rfl: 5    gabapentin (NEURONTIN) 300 MG capsule, Take 1 capsule by mouth 3 times daily for 360 days. , Disp: 270 capsule, Rfl: 3    metoprolol succinate (TOPROL XL) 50 MG extended release tablet, Take 1 tablet by mouth 2 times daily, Disp: 180 tablet, Rfl: 5    losartan (COZAAR) 50 MG tablet, Take 1 tablet by mouth daily, Disp: 90 tablet, Rfl: 5    diclofenac (CATAFLAM) 50 MG tablet, Take 1 tablet by mouth 2 times daily as needed for Pain, Disp: 180 tablet, Rfl: 3    methocarbamol (ROBAXIN) 750 MG tablet, Take 1 tablet by mouth 2 times daily as needed (spasm), Disp: 180 tablet, Rfl: 5    flurandrenolide (CORDRAN) 0.05 % CREA, Apply topically 2 times daily as needed, Disp: , Rfl:   Allergies   Allergen Reactions    Penicillins      Social History     Socioeconomic History    Marital status: Unknown Spouse name: Not on file    Number of children: Not on file    Years of education: Not on file    Highest education level: Not on file   Occupational History    Not on file   Social Needs    Financial resource strain: Not on file    Food insecurity     Worry: Not on file     Inability: Not on file    Transportation needs     Medical: Not on file     Non-medical: Not on file   Tobacco Use    Smoking status: Former Smoker     Years: 25.00     Last attempt to quit: 2012     Years since quittin.8    Smokeless tobacco: Never Used   Substance and Sexual Activity    Alcohol use: Never     Frequency: Never    Drug use: Never    Sexual activity: Never   Lifestyle    Physical activity     Days per week: Not on file     Minutes per session: Not on file    Stress: Not on file   Relationships    Social connections     Talks on phone: Not on file     Gets together: Not on file     Attends [employer] service: Not on file     Active member of club or organization: Not on file     Attends meetings of clubs or organizations: Not on file     Relationship status: Not on file    Intimate partner violence     Fear of current or ex partner: Not on file     Emotionally abused: Not on file     Physically abused: Not on file     Forced sexual activity: Not on file   Other Topics Concern    Not on file   Social History Narrative        HTN    PALP[facility]    [facility] AND CHRONIC JAVED NEURO    SMOKER---QUIT [facility]  1940 Page #2    NEG HEART CAHT     PNEUMONIA LLL 10-10    SHINGLES 1-12 LEFT LEG    LIVES ALONE---ONE SON [place]-------NO KIDS--WIFE   WITH CA CERVIX    ONE DAUGHTER  AT 18    RETIRED -12    COLON 2-07 [address] 8 YRS    R ING OR  [address] [facility] 3-13    BACK PAIN---DR MAK 2014 ANF REF SHOTS--X RAY LUMBAR OA    MRI 6-15 WITH MARKED SPINAL STENOSIS LUMBAR    EVAL DR SERRATO 8-15 ---OFFERED SURGERY BUT MAJOR AND PT DECLINED    SKIN BX 10-15    DIET DM 3-16      Past Medical History:   Diagnosis Date    [facility]     Diet-controlled diabetes mellitus ([facility] 9/12/2019    DJD (degenerative joint disease)     multiple joints    Heart disease     benign hypertensive heart disease without congestive heart failure    Hyperlipidemia     Hypertension     Hypothyroidism     Type 2 diabetes mellitus without complication ([facility]      No family history on file. No past surgical history on file. Vitals:    11/04/20 1123   BP: 132/82   Resp: 16   Temp: 97 °F (36.1 °C)   TempSrc: Temporal   Weight: 216 lb (98 kg)       Objective:    Physical Exam  Vitals signs reviewed. Constitutional:       Appearance: She is well-developed. HENT:      Head: Normocephalic. Eyes:      Pupils: Pupils are equal, round, and reactive to light. Neck:      Musculoskeletal: Normal range of motion. Cardiovascular:      Rate and Rhythm: Normal rate and regular rhythm. Pulmonary:      Effort: Pulmonary effort is normal.      Breath sounds: Normal breath sounds. Abdominal:      Palpations: Abdomen is soft. Musculoskeletal:      Comments: makred dec rom lubmar spine    Skin:     General: Skin is warm. Neurological:      Mental Status: She is alert and oriented to person, place, and time. Psychiatric:         Behavior: Behavior normal.         Yanelis Goetz was seen today for lower back pain.     Diagnoses and all orders for this visit:    Spinal stenosis of lumbar region, unspecified whether neurogenic claudication present  -     methylPREDNISolone acetate (DEPO-MEDROL) injection 40 mg  -     predniSONE (DELTASONE) 10 MG tablet; ONE TID FOR THREE DAYS, ONE BID FOR THREE DAYS, ONE QD FOR THREE DAYS   FOOD  -     External Referral To Pain Clinic        Comments: x ray    appt [facility]     A great deal of time spent reviewing medications, diet, exercise, social issues. Also reviewing the chart before entering the room with patient and finishing charting after leaving patient's room. More than half of that time was spent face to face with the patient in counseling and coordinating care.   Prednisone taper     Depo        Follow Up: Return for See Referral.     Seen by:  Philip Agustin DO

## 2021-03-05 DIAGNOSIS — E03.9 ACQUIRED HYPOTHYROIDISM: ICD-10-CM

## 2021-03-05 DIAGNOSIS — R73.03 PRE-DIABETES: ICD-10-CM

## 2021-03-05 DIAGNOSIS — I10 ESSENTIAL HYPERTENSION: ICD-10-CM

## 2021-03-05 DIAGNOSIS — M81.0 AGE-RELATED OSTEOPOROSIS WITHOUT CURRENT PATHOLOGICAL FRACTURE: ICD-10-CM

## 2021-03-05 DIAGNOSIS — E78.2 MIXED HYPERLIPIDEMIA: ICD-10-CM

## 2021-03-05 LAB
ALBUMIN SERPL-MCNC: 4.3 G/DL (ref 3.5–5.2)
ALP BLD-CCNC: 52 U/L (ref 35–104)
ALT SERPL-CCNC: 17 U/L (ref 0–32)
ANION GAP SERPL CALCULATED.3IONS-SCNC: 15 MMOL/L (ref 7–16)
AST SERPL-CCNC: 18 U/L (ref 0–31)
BASOPHILS ABSOLUTE: 0.03 E9/L (ref 0–0.2)
BASOPHILS RELATIVE PERCENT: 0.4 % (ref 0–2)
BILIRUB SERPL-MCNC: 1.2 MG/DL (ref 0–1.2)
BUN BLDV-MCNC: 17 MG/DL (ref 8–23)
CALCIUM SERPL-MCNC: 9.8 MG/DL (ref 8.6–10.2)
CHLORIDE BLD-SCNC: 105 MMOL/L (ref 98–107)
CHOLESTEROL, TOTAL: 162 MG/DL (ref 0–199)
CO2: 25 MMOL/L (ref 22–29)
CREAT SERPL-MCNC: 0.9 MG/DL (ref 0.5–1)
EOSINOPHILS ABSOLUTE: 0.28 E9/L (ref 0.05–0.5)
EOSINOPHILS RELATIVE PERCENT: 3.7 % (ref 0–6)
GFR AFRICAN AMERICAN: >60
GFR NON-AFRICAN AMERICAN: >60 ML/MIN/1.73
GLUCOSE BLD-MCNC: 105 MG/DL (ref 74–99)
HBA1C MFR BLD: 5.8 % (ref 4–5.6)
HCT VFR BLD CALC: 43.3 % (ref 34–48)
HDLC SERPL-MCNC: 49 MG/DL
HEMOGLOBIN: 14.2 G/DL (ref 11.5–15.5)
IMMATURE GRANULOCYTES #: 0.01 E9/L
IMMATURE GRANULOCYTES %: 0.1 % (ref 0–5)
LDL CHOLESTEROL CALCULATED: 78 MG/DL (ref 0–99)
LYMPHOCYTES ABSOLUTE: 2.3 E9/L (ref 1.5–4)
LYMPHOCYTES RELATIVE PERCENT: 30.5 % (ref 20–42)
MCH RBC QN AUTO: 30.3 PG (ref 26–35)
MCHC RBC AUTO-ENTMCNC: 32.8 % (ref 32–34.5)
MCV RBC AUTO: 92.3 FL (ref 80–99.9)
MONOCYTES ABSOLUTE: 0.64 E9/L (ref 0.1–0.95)
MONOCYTES RELATIVE PERCENT: 8.5 % (ref 2–12)
NEUTROPHILS ABSOLUTE: 4.27 E9/L (ref 1.8–7.3)
NEUTROPHILS RELATIVE PERCENT: 56.8 % (ref 43–80)
PDW BLD-RTO: 12.7 FL (ref 11.5–15)
PLATELET # BLD: 184 E9/L (ref 130–450)
PMV BLD AUTO: 14.6 FL (ref 7–12)
POTASSIUM SERPL-SCNC: 3.9 MMOL/L (ref 3.5–5)
RBC # BLD: 4.69 E12/L (ref 3.5–5.5)
SODIUM BLD-SCNC: 145 MMOL/L (ref 132–146)
T4 TOTAL: 7.2 MCG/DL (ref 4.5–11.7)
TOTAL PROTEIN: 7.5 G/DL (ref 6.4–8.3)
TRIGL SERPL-MCNC: 175 MG/DL (ref 0–149)
TSH SERPL DL<=0.05 MIU/L-ACNC: 3.05 UIU/ML (ref 0.27–4.2)
VITAMIN D 25-HYDROXY: 32 NG/ML (ref 30–100)
VLDLC SERPL CALC-MCNC: 35 MG/DL
WBC # BLD: 7.5 E9/L (ref 4.5–11.5)

## 2021-03-16 ENCOUNTER — OFFICE VISIT (OUTPATIENT)
Dept: PRIMARY CARE CLINIC | Age: 81
End: 2021-03-16
Payer: MEDICARE

## 2021-03-16 VITALS
TEMPERATURE: 99 F | DIASTOLIC BLOOD PRESSURE: 72 MMHG | WEIGHT: 212 LBS | SYSTOLIC BLOOD PRESSURE: 128 MMHG | BODY MASS INDEX: 39.41 KG/M2

## 2021-03-16 DIAGNOSIS — M81.0 AGE-RELATED OSTEOPOROSIS WITHOUT CURRENT PATHOLOGICAL FRACTURE: ICD-10-CM

## 2021-03-16 DIAGNOSIS — R73.03 PRE-DIABETES: Chronic | ICD-10-CM

## 2021-03-16 DIAGNOSIS — D22.9 ATYPICAL MOLE: ICD-10-CM

## 2021-03-16 DIAGNOSIS — E03.9 ACQUIRED HYPOTHYROIDISM: ICD-10-CM

## 2021-03-16 DIAGNOSIS — I10 ESSENTIAL HYPERTENSION: Primary | Chronic | ICD-10-CM

## 2021-03-16 DIAGNOSIS — E78.2 MIXED HYPERLIPIDEMIA: Chronic | ICD-10-CM

## 2021-03-16 DIAGNOSIS — M15.9 PRIMARY OSTEOARTHRITIS INVOLVING MULTIPLE JOINTS: Chronic | ICD-10-CM

## 2021-03-16 PROCEDURE — 99214 OFFICE O/P EST MOD 30 MIN: CPT | Performed by: FAMILY MEDICINE

## 2021-03-16 PROCEDURE — G8400 PT W/DXA NO RESULTS DOC: HCPCS | Performed by: FAMILY MEDICINE

## 2021-03-16 PROCEDURE — 1123F ACP DISCUSS/DSCN MKR DOCD: CPT | Performed by: FAMILY MEDICINE

## 2021-03-16 PROCEDURE — G8482 FLU IMMUNIZE ORDER/ADMIN: HCPCS | Performed by: FAMILY MEDICINE

## 2021-03-16 PROCEDURE — 4040F PNEUMOC VAC/ADMIN/RCVD: CPT | Performed by: FAMILY MEDICINE

## 2021-03-16 PROCEDURE — 1090F PRES/ABSN URINE INCON ASSESS: CPT | Performed by: FAMILY MEDICINE

## 2021-03-16 PROCEDURE — 1036F TOBACCO NON-USER: CPT | Performed by: FAMILY MEDICINE

## 2021-03-16 PROCEDURE — G8417 CALC BMI ABV UP PARAM F/U: HCPCS | Performed by: FAMILY MEDICINE

## 2021-03-16 PROCEDURE — G8428 CUR MEDS NOT DOCUMENT: HCPCS | Performed by: FAMILY MEDICINE

## 2021-03-16 ASSESSMENT — ENCOUNTER SYMPTOMS
EYES NEGATIVE: 1
GASTROINTESTINAL NEGATIVE: 1
RESPIRATORY NEGATIVE: 1
ALLERGIC/IMMUNOLOGIC NEGATIVE: 1

## 2021-03-16 ASSESSMENT — PATIENT HEALTH QUESTIONNAIRE - PHQ9
1. LITTLE INTEREST OR PLEASURE IN DOING THINGS: 0
SUM OF ALL RESPONSES TO PHQ9 QUESTIONS 1 & 2: 0

## 2021-03-16 NOTE — PROGRESS NOTES
3/16/21  Name: Melvyn Sandifer Helmick    : 1940    Sex: female    Age: 68 y.o. Subjective:  Chief Complaint: Patient is here for 6 mo ck   Re      bp chol arthritis     Right leg  resovled with  Shot form dr Carroll Tobar ok   No cp or sob  ghb dec chol ok  Ros neg  Lesion forehead      Review of Systems   Constitutional: Negative. HENT: Negative. Eyes: Negative. Respiratory: Negative. Cardiovascular: Negative. Gastrointestinal: Negative. Endocrine: Negative. Genitourinary: Negative. Musculoskeletal: Positive for arthralgias. Skin: Negative. Allergic/Immunologic: Negative. Neurological: Negative. Hematological: Negative. Psychiatric/Behavioral: Negative. Current Outpatient Medications:     aMILoride-HCTZ 5-50 MG TABS, Take 1 tablet by mouth daily, Disp: 90 tablet, Rfl: 3    simvastatin (ZOCOR) 20 MG tablet, Take 1 tablet by mouth nightly 1 po qd --hs--cholesterol, Disp: 90 tablet, Rfl: 5    gabapentin (NEURONTIN) 300 MG capsule, Take 1 capsule by mouth 3 times daily for 360 days. , Disp: 270 capsule, Rfl: 3    metoprolol succinate (TOPROL XL) 50 MG extended release tablet, Take 1 tablet by mouth 2 times daily, Disp: 180 tablet, Rfl: 5    losartan (COZAAR) 50 MG tablet, Take 1 tablet by mouth daily, Disp: 90 tablet, Rfl: 5    methocarbamol (ROBAXIN) 750 MG tablet, Take 1 tablet by mouth 2 times daily as needed (spasm), Disp: 180 tablet, Rfl: 5    flurandrenolide (CORDRAN) 0.05 % CREA, Apply topically 2 times daily as needed, Disp: , Rfl:   Allergies   Allergen Reactions    Penicillins      Social History     Socioeconomic History    Marital status: Unknown     Spouse name: Not on file    Number of children: Not on file    Years of education: Not on file    Highest education level: Not on file   Occupational History    Not on file   Social Needs    Financial resource strain: Not on file    Food insecurity     Worry: Not on file Inability: Not on file    Transportation needs     Medical: Not on file     Non-medical: Not on file   Tobacco Use    Smoking status: Former Smoker     Years: 25.00     Quit date:      Years since quittin.2    Smokeless tobacco: Never Used   Substance and Sexual Activity    Alcohol use: Never     Frequency: Never    Drug use: Never    Sexual activity: Never   Lifestyle    Physical activity     Days per week: Not on file     Minutes per session: Not on file    Stress: Not on file   Relationships    Social connections     Talks on phone: Not on file     Gets together: Not on file     Attends Hindu service: Not on file     Active member of club or organization: Not on file     Attends meetings of clubs or organizations: Not on file     Relationship status: Not on file    Intimate partner violence     Fear of current or ex partner: Not on file     Emotionally abused: Not on file     Physically abused: Not on file     Forced sexual activity: Not on file   Other Topics Concern    Not on file   Social History Narrative        HTN    PALPIT[facility]    [facility]    [facility] DISC AND CHRONIC JAVED NEURO    SMOKER---QUIT 4-12    EARLY HYPOTHYROID    NEG HEART CAHT     PNEUMONIA LLL 10-10    SHINGLES 1-12 LEFT LEG    LIVES ALONE---ONE SON [place]-------NO KIDS--WIFE   WITH CA CERVIX    ONE DAUGHTER  AT 18    RETIRED 1-12    COLON 2-07 [address] 8 YRS    R ING OR  [facility] 3-13    BACK PAIN---DR MAK 2014 ANF REF SHOTS--X RAY LUMBAR OA    MRI 6-15 WITH MARKED SPINAL STENOSIS LUMBAR    EVAL DR SERRATO -15 ---OFFERED SURGERY BUT MAJOR AND PT DECLINED    SKIN BX 10-15    DIET DM 3-16    Back pain resolved with shot from dr Petty Chadwick  -      Past Medical History:   Diagnosis Date    [facility] [facility]     Diet-controlled diabetes mellitus ([facility] [address] [facility] 2019    DJD (degenerative joint disease) multiple joints    Heart disease     benign hypertensive 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 No past surgical history on file. Vitals:    03/16/21 1257   BP: 128/72   Temp: 99 °F (37.2 °C)   TempSrc: Oral   Weight: 212 lb (96.2 kg)       Objective:    Physical Exam  Vitals signs reviewed. Constitutional:       Appearance: She is well-developed. HENT:      Head: Normocephalic. Eyes:      Pupils: Pupils are equal, round, and reactive to light. Neck:      Musculoskeletal: Normal range of motion. Cardiovascular:      Rate and Rhythm: Normal rate and regular rhythm. Pulmonary:      Effort: Pulmonary effort is normal.      Breath sounds: Normal breath sounds. Abdominal:      Palpations: Abdomen is soft. Musculoskeletal: Normal range of motion. Skin:     General: Skin is warm. Neurological:      Mental Status: She is alert and oriented to person, place, and time. Psychiatric:         Behavior: Behavior normal.         Beola Nyhan was seen today for discuss labs. Diagnoses and all orders for this visit:    Essential hypertension    Mixed hyperlipidemia    Pre-diabetes    Primary osteoarthritis involving multiple joints    Atypical carlos eduardo Andrade MD, Dermatology, [place] ([place])        Comments: diet exer  Low  Fat and sugar diet    Hm issues  A great deal of time spent reviewing medications, diet, exercise, social issues. Also reviewing the chart before entering the room with patient and finishing charting after leaving patient's room. More than half of that time was spent face to face with the patient in counseling and coordinating care. Ck bp dily         Follow Up: Return in about 6 months (around 9/16/2021) for With PT.      Seen by:  Joselin Cisneros DO

## 2021-04-07 ENCOUNTER — IMMUNIZATION (OUTPATIENT)
Dept: PRIMARY CARE CLINIC | Age: 81
End: 2021-04-07
Payer: MEDICARE

## 2021-04-07 PROCEDURE — 91301 COVID-19, MODERNA VACCINE 100MCG/0.5ML DOSE: CPT | Performed by: PHYSICIAN ASSISTANT

## 2021-04-07 PROCEDURE — 0011A COVID-19, MODERNA VACCINE 100MCG/0.5ML DOSE: CPT | Performed by: PHYSICIAN ASSISTANT

## 2021-05-05 ENCOUNTER — IMMUNIZATION (OUTPATIENT)
Dept: PRIMARY CARE CLINIC | Age: 81
End: 2021-05-05
Payer: MEDICARE

## 2021-05-05 PROCEDURE — 91301 COVID-19, MODERNA VACCINE 100MCG/0.5ML DOSE: CPT | Performed by: PHYSICIAN ASSISTANT

## 2021-05-05 PROCEDURE — 0012A COVID-19, MODERNA VACCINE 100MCG/0.5ML DOSE: CPT | Performed by: PHYSICIAN ASSISTANT

## 2021-09-14 ENCOUNTER — OFFICE VISIT (OUTPATIENT)
Dept: PRIMARY CARE CLINIC | Age: 81
End: 2021-09-14

## 2021-09-14 DIAGNOSIS — I10 ESSENTIAL HYPERTENSION: Primary | Chronic | ICD-10-CM

## 2021-09-14 PROCEDURE — 99999 PR OFFICE/OUTPT VISIT,PROCEDURE ONLY: CPT | Performed by: FAMILY MEDICINE

## 2021-09-14 ASSESSMENT — PATIENT HEALTH QUESTIONNAIRE - PHQ9
SUM OF ALL RESPONSES TO PHQ QUESTIONS 1-9: 0
SUM OF ALL RESPONSES TO PHQ QUESTIONS 1-9: 0
2. FEELING DOWN, DEPRESSED OR HOPELESS: 0
SUM OF ALL RESPONSES TO PHQ9 QUESTIONS 1 & 2: 0
1. LITTLE INTEREST OR PLEASURE IN DOING THINGS: 0
SUM OF ALL RESPONSES TO PHQ QUESTIONS 1-9: 0

## 2021-09-16 ENCOUNTER — OFFICE VISIT (OUTPATIENT)
Dept: PRIMARY CARE CLINIC | Age: 81
End: 2021-09-16
Payer: MEDICARE

## 2021-09-16 VITALS
DIASTOLIC BLOOD PRESSURE: 82 MMHG | RESPIRATION RATE: 16 BRPM | SYSTOLIC BLOOD PRESSURE: 134 MMHG | OXYGEN SATURATION: 95 % | HEART RATE: 73 BPM | HEIGHT: 62 IN | WEIGHT: 207 LBS | BODY MASS INDEX: 38.09 KG/M2 | TEMPERATURE: 97.2 F

## 2021-09-16 DIAGNOSIS — Z23 NEED FOR INFLUENZA VACCINATION: ICD-10-CM

## 2021-09-16 DIAGNOSIS — E78.2 MIXED HYPERLIPIDEMIA: ICD-10-CM

## 2021-09-16 DIAGNOSIS — I10 ESSENTIAL HYPERTENSION: Primary | ICD-10-CM

## 2021-09-16 DIAGNOSIS — M81.0 AGE-RELATED OSTEOPOROSIS WITHOUT CURRENT PATHOLOGICAL FRACTURE: ICD-10-CM

## 2021-09-16 DIAGNOSIS — R73.03 PRE-DIABETES: Chronic | ICD-10-CM

## 2021-09-16 DIAGNOSIS — E03.9 ACQUIRED HYPOTHYROIDISM: ICD-10-CM

## 2021-09-16 DIAGNOSIS — M51.06 INTERVERTEBRAL LUMBAR DISC DISORDER WITH MYELOPATHY, LUMBAR REGION: ICD-10-CM

## 2021-09-16 PROCEDURE — G8417 CALC BMI ABV UP PARAM F/U: HCPCS | Performed by: FAMILY MEDICINE

## 2021-09-16 PROCEDURE — 90694 VACC AIIV4 NO PRSRV 0.5ML IM: CPT | Performed by: FAMILY MEDICINE

## 2021-09-16 PROCEDURE — 1036F TOBACCO NON-USER: CPT | Performed by: FAMILY MEDICINE

## 2021-09-16 PROCEDURE — G8400 PT W/DXA NO RESULTS DOC: HCPCS | Performed by: FAMILY MEDICINE

## 2021-09-16 PROCEDURE — G8427 DOCREV CUR MEDS BY ELIG CLIN: HCPCS | Performed by: FAMILY MEDICINE

## 2021-09-16 PROCEDURE — 4040F PNEUMOC VAC/ADMIN/RCVD: CPT | Performed by: FAMILY MEDICINE

## 2021-09-16 PROCEDURE — 1123F ACP DISCUSS/DSCN MKR DOCD: CPT | Performed by: FAMILY MEDICINE

## 2021-09-16 PROCEDURE — 1090F PRES/ABSN URINE INCON ASSESS: CPT | Performed by: FAMILY MEDICINE

## 2021-09-16 PROCEDURE — G0008 ADMIN INFLUENZA VIRUS VAC: HCPCS | Performed by: FAMILY MEDICINE

## 2021-09-16 PROCEDURE — 99214 OFFICE O/P EST MOD 30 MIN: CPT | Performed by: FAMILY MEDICINE

## 2021-09-16 RX ORDER — METOPROLOL SUCCINATE 50 MG/1
50 TABLET, EXTENDED RELEASE ORAL 2 TIMES DAILY
Qty: 180 TABLET | Refills: 5 | Status: SHIPPED
Start: 2021-09-16 | End: 2022-09-21 | Stop reason: SDUPTHER

## 2021-09-16 RX ORDER — SIMVASTATIN 20 MG
20 TABLET ORAL NIGHTLY
Qty: 90 TABLET | Refills: 5 | Status: SHIPPED
Start: 2021-09-16 | End: 2022-09-21 | Stop reason: SDUPTHER

## 2021-09-16 RX ORDER — GABAPENTIN 300 MG/1
300 CAPSULE ORAL 3 TIMES DAILY
Qty: 270 CAPSULE | Refills: 3 | Status: SHIPPED
Start: 2021-09-16 | End: 2022-09-21 | Stop reason: SDUPTHER

## 2021-09-16 RX ORDER — AMILORIDE HYDROCHLORIDE AND HYDROCHLOROTHIAZIDE 5; 50 MG/1; MG/1
1 TABLET ORAL DAILY
Qty: 90 TABLET | Refills: 3 | Status: SHIPPED
Start: 2021-09-16 | End: 2022-09-21 | Stop reason: SDUPTHER

## 2021-09-16 RX ORDER — LOSARTAN POTASSIUM 50 MG/1
50 TABLET ORAL DAILY
Qty: 90 TABLET | Refills: 5 | Status: SHIPPED
Start: 2021-09-16 | End: 2022-09-21 | Stop reason: SDUPTHER

## 2021-09-16 ASSESSMENT — ENCOUNTER SYMPTOMS
ALLERGIC/IMMUNOLOGIC NEGATIVE: 1
RESPIRATORY NEGATIVE: 1
EYES NEGATIVE: 1
GASTROINTESTINAL NEGATIVE: 1
BACK PAIN: 1

## 2021-09-16 NOTE — PROGRESS NOTES
21  Name: Radha Qiu    : 1940    Sex: female    Age: 80 y.o. Subjective:  Chief Complaint: Patient is here for 6 mock  Re  bp c hol arthrtitis    Back pain     Feel ok   Wt dwon 5  Lbs   shtos with dr Riley Torres again soon   La b  Ok   No cp rsobo       Review of Systems   Constitutional: Negative. HENT: Negative. Eyes: Negative. Respiratory: Negative. Cardiovascular: Negative. Gastrointestinal: Negative. Endocrine: Negative. Genitourinary: Negative. Musculoskeletal: Positive for back pain. Skin: Negative. Allergic/Immunologic: Negative. Neurological: Negative. Hematological: Negative. Psychiatric/Behavioral: Negative. Current Outpatient Medications:     gabapentin (NEURONTIN) 300 MG capsule, Take 1 capsule by mouth 3 times daily for 360 days. , Disp: 270 capsule, Rfl: 3    aMILoride-HCTZ 5-50 MG TABS, Take 1 tablet by mouth daily, Disp: 90 tablet, Rfl: 3    losartan (COZAAR) 50 MG tablet, Take 1 tablet by mouth daily, Disp: 90 tablet, Rfl: 5    metoprolol succinate (TOPROL XL) 50 MG extended release tablet, Take 1 tablet by mouth 2 times daily, Disp: 180 tablet, Rfl: 5    simvastatin (ZOCOR) 20 MG tablet, Take 1 tablet by mouth nightly 1 po qd --hs--cholesterol, Disp: 90 tablet, Rfl: 5    silver sulfADIAZINE (SILVADENE) 1 % cream, Apply topically daily. , Disp: 120 g, Rfl: 5    flurandrenolide (CORDRAN) 0.05 % CREA, Apply topically 2 times daily as needed, Disp: , Rfl:   Allergies   Allergen Reactions    Penicillins      Social History     Socioeconomic History    Marital status: Single     Spouse name: Not on file    Number of children: Not on file    Years of education: Not on file    Highest education level: Not on file   Occupational History    Not on file   Tobacco Use    Smoking status: Former Smoker     Years: 25.00     Quit date:      Years since quittin.7    Smokeless tobacco: Never Used   Vaping Use    Vaping Use: Never used   Substance and Sexual Activity    Alcohol use: Never    Drug use: Never    Sexual activity: Never   Other Topics Concern    Not on file   Social History Narrative        HTN    PALPITATIONS    LIPID    [facility]  [facility]    L-5 DISC AND CHRONIC JAVED NEURO    SMOKER---QUIT -12    EARLY HYPOTHYROID    NEG HEART CAHT     PNEUMONIA LLL 10-10    SHINGLES 1-12 LEFT LEG    LIVES ALONE---ONE SON [place]-------NO KIDS--WIFE   WITH CA CERVIX    ONE DAUGHTER  AT 18    RETIRED -    COLON 2-07 [address] 8 YRS    R ING OR  [address]    FIGHT FALLOPIAN CYST--SENA THEN CHRISTIAN 3-    BACK PAIN---DR MAK  ANF REF SHOTS--X RAY LUMBAR OA    MRI 6-15 WITH MARKED SPINAL STENOSIS LUMBAR    EVAL DR SERRATO 8-15 ---OFFERED SURGERY BUT MAJOR AND PT DECLINED    SKIN BX 10-15    DIET DM 3-16    Back pain resolved with shot from dr Jalen Anderson  --re do        Social Determinants of Health     Financial Resource Strain:     Difficulty of Paying Living Expenses:    Food Insecurity:     Worried About Running Out of Food in the Last Year:     Ran Out of Food in the Last Year:    Transportation Needs:     Lack of Transportation (Medical):      Lack of Transportation (Non-Medical):    Physical Activity:     Days of Exercise per Week:     Minutes of Exercise per Session:    Stress:     Feeling of Stress :    Social Connections:     Frequency of Communication with Friends and Family:     Frequency of Social Gatherings with Friends and Family:     Attends Jehovah's witness Services:     Active Member of Clubs or Organizations:     Attends Club or Organization Meetings:     Marital Status:    Intimate Partner Violence:     Fear of Current or Ex-Partner:     Emotionally Abused:     Physically Abused:     Sexually Abused:       Past Medical History:   Diagnosis Date    [facility] disease ([facility] [address] [facility]     Diet-controlled diabetes mellitus ([facility] [address] [facility] 2019    DJD (degenerative joint disease)     multiple joints    Heart disease     benign hypertensive heart disease without congestive heart failure    Hyperlipidemia     Hypertension     Hypothyroidism     Type 2 diabetes mellitus without complication ([facility] [address]      History reviewed. No pertinent family history. History reviewed. No pertinent surgical history. Vitals:    09/16/21 1308   BP: 134/82   Pulse: 73   Resp: 16   Temp: 97.2 °F (36.2 °C)   SpO2: 95%   Weight: 207 lb (93.9 kg)   Height: 5' 1.5\" (1.562 m)       Objective:    Physical Exam  Vitals reviewed. Constitutional:       Appearance: She is well-developed. HENT:      Head: Normocephalic. Eyes:      Pupils: Pupils are equal, round, and reactive to light. Cardiovascular:      Rate and Rhythm: Normal rate and regular rhythm. Pulmonary:      Effort: Pulmonary effort is normal.      Breath sounds: Normal breath sounds. Abdominal:      Palpations: Abdomen is soft. Musculoskeletal:         General: Normal range of motion. Cervical back: Normal range of motion. Skin:     General: Skin is warm. Neurological:      Mental Status: She is alert and oriented to person, place, and time. Psychiatric:         Behavior: Behavior normal.         Jodie Cushing was seen today for hypertension. Diagnoses and all orders for this visit:    Essential hypertension  -     aMILoride-HCTZ 5-50 MG TABS; Take 1 tablet by mouth daily  -     losartan (COZAAR) 50 MG tablet; Take 1 tablet by mouth daily  -     metoprolol succinate (TOPROL XL) 50 MG extended release tablet; Take 1 tablet by mouth 2 times daily    Intervertebral lumbar disc disorder with myelopathy, lumbar region  -     gabapentin (NEURONTIN) 300 MG capsule; Take 1 capsule by mouth 3 times daily for 360 days. Mixed hyperlipidemia  -     simvastatin (ZOCOR) 20 MG tablet;  Take 1 tablet by mouth nightly 1 po qd --hs--cholesterol    Pre-diabetes    Need for influenza vaccination  -     Glynn Raselder, ADJUVANTED, 65 YRS =, IM, PF, PREFILL SYR, 0.5ML (FLUAD)    Other orders  -     silver sulfADIAZINE (SILVADENE) 1 % cream; Apply topically daily. Comments: diet exer hm isues    Low  Fat diet     Check blood pressure at home twice a day. Low-salt low caffeine diet. Call if systolic blood pressure is above 863 and diastolic blood pressures above 85. Only use a upper arm digital cuff. Aggressive low-fat diet. Avoid red meats, greasy fried foods, dairy products. Avoid processed foods. Take cholesterol medications without food. Check blood sugars 4 times a day. Aggressive low, sugar low carbohydrate diet. Call if blood sugar less than 70 or over 200. Avoid eating in between meals. Lose weight. Exercise. A great deal of time spent reviewing medications, diet, exercise, social issues. Also reviewing the chart before entering the room with patient and finishing charting after leaving patient's room. More than half of that time was spent face to face with the patient in counseling and coordinating care. Follow Up: Return in about 6 months (around 3/16/2022) for Lab Before.      Seen by:  Martin Mathur, DO

## 2022-02-22 RX ORDER — DICLOFENAC POTASSIUM 50 MG/1
50 TABLET, FILM COATED ORAL 2 TIMES DAILY
Qty: 90 TABLET | Refills: 3 | Status: SHIPPED
Start: 2022-02-22 | End: 2022-09-21 | Stop reason: SDUPTHER

## 2022-02-22 RX ORDER — DICLOFENAC POTASSIUM 50 MG/1
TABLET, FILM COATED ORAL
COMMUNITY
Start: 2017-03-24 | End: 2022-02-22 | Stop reason: SDUPTHER

## 2022-03-09 DIAGNOSIS — M81.0 AGE-RELATED OSTEOPOROSIS WITHOUT CURRENT PATHOLOGICAL FRACTURE: ICD-10-CM

## 2022-03-09 DIAGNOSIS — E78.2 MIXED HYPERLIPIDEMIA: ICD-10-CM

## 2022-03-09 DIAGNOSIS — R73.03 PRE-DIABETES: Chronic | ICD-10-CM

## 2022-03-09 DIAGNOSIS — I10 ESSENTIAL HYPERTENSION: ICD-10-CM

## 2022-03-09 DIAGNOSIS — E03.9 ACQUIRED HYPOTHYROIDISM: ICD-10-CM

## 2022-03-09 LAB
ALBUMIN SERPL-MCNC: 4.1 G/DL (ref 3.5–5.2)
ALP BLD-CCNC: 55 U/L (ref 35–104)
ALT SERPL-CCNC: 15 U/L (ref 0–32)
ANION GAP SERPL CALCULATED.3IONS-SCNC: 13 MMOL/L (ref 7–16)
AST SERPL-CCNC: 19 U/L (ref 0–31)
BACTERIA: ABNORMAL /HPF
BASOPHILS ABSOLUTE: 0.02 E9/L (ref 0–0.2)
BASOPHILS RELATIVE PERCENT: 0.3 % (ref 0–2)
BILIRUB SERPL-MCNC: 0.8 MG/DL (ref 0–1.2)
BILIRUBIN URINE: NEGATIVE
BLOOD, URINE: NEGATIVE
BUN BLDV-MCNC: 18 MG/DL (ref 6–23)
CALCIUM SERPL-MCNC: 9.6 MG/DL (ref 8.6–10.2)
CHLORIDE BLD-SCNC: 102 MMOL/L (ref 98–107)
CHOLESTEROL, TOTAL: 163 MG/DL (ref 0–199)
CLARITY: CLEAR
CO2: 27 MMOL/L (ref 22–29)
COLOR: YELLOW
CREAT SERPL-MCNC: 1.1 MG/DL (ref 0.5–1)
EOSINOPHILS ABSOLUTE: 0.33 E9/L (ref 0.05–0.5)
EOSINOPHILS RELATIVE PERCENT: 4.9 % (ref 0–6)
GFR AFRICAN AMERICAN: 58
GFR NON-AFRICAN AMERICAN: 48 ML/MIN/1.73
GLUCOSE BLD-MCNC: 98 MG/DL (ref 74–99)
GLUCOSE URINE: NEGATIVE MG/DL
HBA1C MFR BLD: 5.9 % (ref 4–5.6)
HCT VFR BLD CALC: 41.1 % (ref 34–48)
HDLC SERPL-MCNC: 53 MG/DL
HEMOGLOBIN: 13.3 G/DL (ref 11.5–15.5)
IMMATURE GRANULOCYTES #: 0.02 E9/L
IMMATURE GRANULOCYTES %: 0.3 % (ref 0–5)
KETONES, URINE: NEGATIVE MG/DL
LDL CHOLESTEROL CALCULATED: 86 MG/DL (ref 0–99)
LEUKOCYTE ESTERASE, URINE: ABNORMAL
LYMPHOCYTES ABSOLUTE: 2.25 E9/L (ref 1.5–4)
LYMPHOCYTES RELATIVE PERCENT: 33.7 % (ref 20–42)
MCH RBC QN AUTO: 29.9 PG (ref 26–35)
MCHC RBC AUTO-ENTMCNC: 32.4 % (ref 32–34.5)
MCV RBC AUTO: 92.4 FL (ref 80–99.9)
MONOCYTES ABSOLUTE: 0.48 E9/L (ref 0.1–0.95)
MONOCYTES RELATIVE PERCENT: 7.2 % (ref 2–12)
NEUTROPHILS ABSOLUTE: 3.58 E9/L (ref 1.8–7.3)
NEUTROPHILS RELATIVE PERCENT: 53.6 % (ref 43–80)
NITRITE, URINE: NEGATIVE
PDW BLD-RTO: 12.6 FL (ref 11.5–15)
PH UA: 6 (ref 5–9)
PLATELET # BLD: 170 E9/L (ref 130–450)
PMV BLD AUTO: 13.8 FL (ref 7–12)
POTASSIUM SERPL-SCNC: 3.9 MMOL/L (ref 3.5–5)
PROTEIN UA: NEGATIVE MG/DL
RBC # BLD: 4.45 E12/L (ref 3.5–5.5)
RBC UA: ABNORMAL /HPF (ref 0–2)
SODIUM BLD-SCNC: 142 MMOL/L (ref 132–146)
SPECIFIC GRAVITY UA: <=1.005 (ref 1–1.03)
T4 TOTAL: 5.7 MCG/DL (ref 4.5–11.7)
TOTAL PROTEIN: 7.4 G/DL (ref 6.4–8.3)
TRIGL SERPL-MCNC: 118 MG/DL (ref 0–149)
TSH SERPL DL<=0.05 MIU/L-ACNC: 3.86 UIU/ML (ref 0.27–4.2)
UROBILINOGEN, URINE: 0.2 E.U./DL
VITAMIN D 25-HYDROXY: 34 NG/ML (ref 30–100)
VLDLC SERPL CALC-MCNC: 24 MG/DL
WBC # BLD: 6.7 E9/L (ref 4.5–11.5)
WBC UA: ABNORMAL /HPF (ref 0–5)

## 2022-03-17 ENCOUNTER — OFFICE VISIT (OUTPATIENT)
Dept: PRIMARY CARE CLINIC | Age: 82
End: 2022-03-17
Payer: MEDICARE

## 2022-03-17 VITALS
BODY MASS INDEX: 39.41 KG/M2 | OXYGEN SATURATION: 96 % | WEIGHT: 212 LBS | SYSTOLIC BLOOD PRESSURE: 132 MMHG | DIASTOLIC BLOOD PRESSURE: 82 MMHG | HEART RATE: 58 BPM | TEMPERATURE: 98.9 F

## 2022-03-17 DIAGNOSIS — E03.9 ACQUIRED HYPOTHYROIDISM: ICD-10-CM

## 2022-03-17 DIAGNOSIS — M81.0 AGE-RELATED OSTEOPOROSIS WITHOUT CURRENT PATHOLOGICAL FRACTURE: ICD-10-CM

## 2022-03-17 DIAGNOSIS — Z00.00 MEDICARE ANNUAL WELLNESS VISIT, SUBSEQUENT: Primary | ICD-10-CM

## 2022-03-17 DIAGNOSIS — I10 ESSENTIAL HYPERTENSION: Chronic | ICD-10-CM

## 2022-03-17 DIAGNOSIS — M51.06 INTERVERTEBRAL LUMBAR DISC DISORDER WITH MYELOPATHY, LUMBAR REGION: Chronic | ICD-10-CM

## 2022-03-17 DIAGNOSIS — E78.2 MIXED HYPERLIPIDEMIA: Chronic | ICD-10-CM

## 2022-03-17 DIAGNOSIS — E11.9 DIET-CONTROLLED DIABETES MELLITUS (HCC): ICD-10-CM

## 2022-03-17 PROCEDURE — G0439 PPPS, SUBSEQ VISIT: HCPCS | Performed by: FAMILY MEDICINE

## 2022-03-17 PROCEDURE — 4040F PNEUMOC VAC/ADMIN/RCVD: CPT | Performed by: FAMILY MEDICINE

## 2022-03-17 PROCEDURE — 3044F HG A1C LEVEL LT 7.0%: CPT | Performed by: FAMILY MEDICINE

## 2022-03-17 PROCEDURE — G8484 FLU IMMUNIZE NO ADMIN: HCPCS | Performed by: FAMILY MEDICINE

## 2022-03-17 PROCEDURE — 1123F ACP DISCUSS/DSCN MKR DOCD: CPT | Performed by: FAMILY MEDICINE

## 2022-03-17 RX ORDER — HYDROCODONE BITARTRATE AND ACETAMINOPHEN 5; 325 MG/1; MG/1
TABLET ORAL
COMMUNITY
Start: 2022-02-02

## 2022-03-17 ASSESSMENT — PATIENT HEALTH QUESTIONNAIRE - PHQ9
SUM OF ALL RESPONSES TO PHQ QUESTIONS 1-9: 0
1. LITTLE INTEREST OR PLEASURE IN DOING THINGS: 0
2. FEELING DOWN, DEPRESSED OR HOPELESS: 0
SUM OF ALL RESPONSES TO PHQ9 QUESTIONS 1 & 2: 0
SUM OF ALL RESPONSES TO PHQ QUESTIONS 1-9: 0

## 2022-03-17 ASSESSMENT — LIFESTYLE VARIABLES: HOW OFTEN DO YOU HAVE A DRINK CONTAINING ALCOHOL: NEVER

## 2022-03-17 NOTE — PROGRESS NOTES
Medicare Annual Wellness Visit    Qing Villalobos is here for Medicare AWV    Assessment & Plan   Medicare annual wellness visit, subsequent  Essential hypertension  Mixed hyperlipidemia  Diet-controlled diabetes mellitus ([facility] [address]  Intervertebral lumbar disc disorder with myelopathy, lumbar region      Recommendations for Preventive Services Due: see orders and patient instructions/AVS.  Recommended screening schedule for the next 5-10 years is provided to the patient in written form: see Patient Instructions/AVS.     Return in 6 months (on 9/17/2022) for Lab Before. Subjective   The following acute and/or chronic problems were also addressed today:  6  Mock    Chol  bp arth    Back pain    Back better   With hsots  Dr Cat Castle    Lab ok  [facility]      I educated the patient about all medications. Make sure they were correct and educated  on the risk associated with missing meds or taking them incorrectly. A list of medications is being sent home with patient today. ,Check blood pressure at home twice a day. Low-salt low caffeine diet. Call if systolic blood pressure is above 328 and diastolic blood pressures above 85. Only use a upper arm digital cuff. .   Check blood pressure at home twice a day. Low-salt low caffeine diet. Call if systolic blood pressure is above 989 and diastolic blood pressures above 85. Only use a upper arm digital cuff. Check blood sugars 4 times a day. Aggressive low, sugar low carbohydrate diet. Call if blood sugar less than 70 or over 200. Avoid eating in between meals. Lose weight. Exercise. I informed patient about the risk associated with noncompliance of medication and taking medications incorrectly. Appropriate follow-up with myself and all specialist.  Encourage family members to take active role in assisting with medications and medical care.   If any confusion should develop to notify my office immediately to avoid risk of worsening medical condition    A great deal of time spent reviewing medications, diet, exercise, social issues. Also reviewing the chart before entering the room with patient and finishing charting after leaving patient's room. More than half of that time was spent face to face with the patient in counseling and coordinating care. Patient's complete Health Risk Assessment and screening values have been reviewed and are found in Flowsheets. The following problems were reviewed today and where indicated follow up appointments were made and/or referrals ordered. Positive Risk Factor Screenings with Interventions:               General Health and ACP:  General  In general, how would you say your health is?: Good  In the past 7 days, have you experienced any of the following: New or Increased Pain, New or Increased Fatigue, Loneliness, Social Isolation, Stress or Anger?: No  Do you get the social and emotional support that you need?: Yes  Do you have a Living Will?: Yes    Advance Directives     Power of  Living Will ACP-Advance Directive ACP-Power of     Not on File Not on File Not on File Not on File      General Health Risk Interventions:  · No Living Will: Advance Care Planning addressed with patient today    Health Habits/Nutrition:     Physical Activity: Inactive    Days of Exercise per Week: 0 days    Minutes of Exercise per Session: 0 min     Have you lost any weight without trying in the past 3 months?: No     Have you seen the dentist within the past year?: Yes    Health Habits/Nutrition Interventions:  · Inadequate physical activity:  patient agrees to exercise for at least 150 minutes/week             Objective   Vitals:    03/17/22 1246   BP: 132/82   Pulse: 58   Temp: 98.9 °F (37.2 °C)   SpO2: 96%   Weight: 212 lb (96.2 kg)      Body mass index is 39.41 kg/m².         General Appearance: alert and oriented to person, place and time, well developed and well- nourished, in no acute distress  Skin: warm and dry, no rash or erythema  Head: normocephalic and atraumatic  Eyes: pupils equal, round, and reactive to light, extraocular eye movements intact, conjunctivae normal  ENT: tympanic membrane, external ear and ear canal normal bilaterally, nose without deformity, nasal mucosa and turbinates normal without polyps  Neck: supple and non-tender without mass, no thyromegaly or thyroid nodules, no cervical lymphadenopathy  Pulmonary/Chest: clear to auscultation bilaterally- no wheezes, rales or rhonchi, normal air movement, no respiratory distress  Cardiovascular: normal rate, regular rhythm, normal S1 and S2, no murmurs, rubs, clicks, or gallops, distal pulses intact, no carotid bruits  Abdomen: soft, non-tender, non-distended, normal bowel sounds, no masses or organomegaly  Extremities: no cyanosis, clubbing or edema  Musculoskeletal: normal range of motion, no joint swelling, deformity or tenderness  Neurologic: reflexes normal and symmetric, no cranial nerve deficit, gait, coordination and speech normal       Allergies   Allergen Reactions    Penicillins      Prior to Visit Medications    Medication Sig Taking? Authorizing Provider   HYDROcodone-acetaminophen ([address]) 5-325 MG per tablet TAKE 1 TABLET BY MOUTH TWICE A DAY AS NEEDED FOR PAIN. START 02/02/2022 AND END 03/03/2022 Yes Historical Provider, MD   diclofenac (CATAFLAM) 50 MG tablet Take 1 tablet by mouth 2 times daily Yes Edmond Mg DO   gabapentin (NEURONTIN) 300 MG capsule Take 1 capsule by mouth 3 times daily for 360 days.  Yes Edmond Mg DO   aMILoride-HCTZ 5-50 MG TABS Take 1 tablet by mouth daily Yes Edmond Mg DO   losartan (COZAAR) 50 MG tablet Take 1 tablet by mouth daily Yes Edmond Mg DO   metoprolol succinate (TOPROL XL) 50 MG extended release tablet Take 1 tablet by mouth 2 times daily Yes Edmond Mg DO   simvastatin (ZOCOR) 20 MG tablet Take 1 tablet by mouth nightly 1 po qd --hs--cholesterol Yes Edmond Mg, DO   silver sulfADIAZINE (SILVADENE) 1 % cream Apply topically daily. Yes Edmond Mg, DO   flurandrenolide (CORDRAN) 0.05 % CREA Apply topically 2 times daily as needed Yes Historical Provider, MD Duran (Including outside providers/suppliers regularly involved in providing care):   Patient Care Team:  Everton Perkins 117, DO as PCP - General (Family Medicine)  Everton Perkins 117, DO as PCP - [address] [place], [place] [address]earl Metz Provider    Reviewed and updated this visit:  Allergies             diet exer hm issues      I educated the patient about all medications. Make sure they were correct and educated  on the risk associated with missing meds or taking them incorrectly. A list of medications is being sent home with patient today. Check blood pressure at home twice a day. Low-salt low caffeine diet. Call if systolic blood pressure is above 975 and diastolic blood pressures above 85. Only use a upper arm digital cuff. Aggressive low-fat diet. Avoid red meats, greasy fried foods, dairy products. Avoid processed foods. Take cholesterol medications without food. Check blood sugars 4 times a day. Aggressive low, sugar low carbohydrate diet. Call if blood sugar less than 70 or over 200. Avoid eating in between meals. Lose weight. Exercise. I informed patient about the risk associated with noncompliance of medication and taking medications incorrectly. Appropriate follow-up with myself and all specialist.  Encourage family members to take active role in assisting with medications and medical care. If any confusion should develop to notify my office immediately to avoid risk of worsening medical condition    A great deal of time spent reviewing medications, diet, exercise, social issues. Also reviewing the chart before entering the room with patient and finishing charting after leaving patient's room.  More than half of that time was spent face to face with the patient in counseling and coordinating care.

## 2022-03-17 NOTE — PATIENT INSTRUCTIONS
Personalized Preventive Plan for Bronson Taylor - 3/17/2022  Medicare offers a range of preventive health benefits. Some of the tests and screenings are paid in full while other may be subject to a deductible, co-insurance, and/or copay. Some of these benefits include a comprehensive review of your medical history including lifestyle, illnesses that may run in your family, and various assessments and screenings as appropriate. After reviewing your medical record and screening and assessments performed today your provider may have ordered immunizations, labs, imaging, and/or referrals for you. A list of these orders (if applicable) as well as your Preventive Care list are included within your After Visit Summary for your review. Other Preventive Recommendations:    · A preventive eye exam performed by an eye specialist is recommended every 1-2 years to screen for glaucoma; cataracts, macular degeneration, and other eye disorders. · A preventive dental visit is recommended every 6 months. · Try to get at least 150 minutes of exercise per week or 10,000 steps per day on a pedometer . · Order or download the FREE \"Exercise & Physical Activity: Your Everyday Guide\" from [employer]. Call 0-599.403.1229 or search [employer] online. · You need 9380-1044 mg of calcium and 5955-8876 IU of vitamin D per day. It is possible to meet your calcium requirement with diet alone, but a vitamin D supplement is usually necessary to meet this goal.  · When exposed to the sun, use a sunscreen that protects against both UVA and UVB radiation with an SPF of 30 or greater. Reapply every 2 to 3 hours or after sweating, drying off with a towel, or swimming. · Always wear a seat belt when traveling in a car. Always wear a helmet when riding a bicycle or motorcycle.

## 2022-06-23 ENCOUNTER — TELEPHONE (OUTPATIENT)
Dept: PRIMARY CARE CLINIC | Age: 82
End: 2022-06-23

## 2022-06-23 DIAGNOSIS — M79.671 RIGHT FOOT PAIN: Primary | ICD-10-CM

## 2022-09-14 DIAGNOSIS — E03.9 ACQUIRED HYPOTHYROIDISM: ICD-10-CM

## 2022-09-14 DIAGNOSIS — M81.0 AGE-RELATED OSTEOPOROSIS WITHOUT CURRENT PATHOLOGICAL FRACTURE: ICD-10-CM

## 2022-09-14 DIAGNOSIS — E78.2 MIXED HYPERLIPIDEMIA: Chronic | ICD-10-CM

## 2022-09-14 DIAGNOSIS — E11.9 DIET-CONTROLLED DIABETES MELLITUS (HCC): ICD-10-CM

## 2022-09-14 DIAGNOSIS — I10 ESSENTIAL HYPERTENSION: Chronic | ICD-10-CM

## 2022-09-14 LAB
ALBUMIN SERPL-MCNC: 4.1 G/DL (ref 3.5–5.2)
ALP BLD-CCNC: 57 U/L (ref 35–104)
ALT SERPL-CCNC: 15 U/L (ref 0–32)
AMORPHOUS: ABNORMAL
ANION GAP SERPL CALCULATED.3IONS-SCNC: 11 MMOL/L (ref 7–16)
AST SERPL-CCNC: 16 U/L (ref 0–31)
BACTERIA: ABNORMAL /HPF
BASOPHILS ABSOLUTE: 0.03 E9/L (ref 0–0.2)
BASOPHILS RELATIVE PERCENT: 0.3 % (ref 0–2)
BILIRUB SERPL-MCNC: 0.8 MG/DL (ref 0–1.2)
BILIRUBIN URINE: NEGATIVE
BLOOD, URINE: NEGATIVE
BUN BLDV-MCNC: 19 MG/DL (ref 6–23)
CALCIUM SERPL-MCNC: 10.1 MG/DL (ref 8.6–10.2)
CHLORIDE BLD-SCNC: 103 MMOL/L (ref 98–107)
CHOLESTEROL, TOTAL: 155 MG/DL (ref 0–199)
CLARITY: CLEAR
CO2: 27 MMOL/L (ref 22–29)
COLOR: YELLOW
CREAT SERPL-MCNC: 1 MG/DL (ref 0.5–1)
CRYSTALS, UA: ABNORMAL /HPF
EOSINOPHILS ABSOLUTE: 0.34 E9/L (ref 0.05–0.5)
EOSINOPHILS RELATIVE PERCENT: 3.9 % (ref 0–6)
EPITHELIAL CELLS, UA: ABNORMAL /HPF
GFR AFRICAN AMERICAN: >60
GFR NON-AFRICAN AMERICAN: 53 ML/MIN/1.73
GLUCOSE BLD-MCNC: 102 MG/DL (ref 74–99)
GLUCOSE URINE: NEGATIVE MG/DL
HBA1C MFR BLD: 5.8 % (ref 4–5.6)
HCT VFR BLD CALC: 41.2 % (ref 34–48)
HDLC SERPL-MCNC: 45 MG/DL
HEMOGLOBIN: 13.5 G/DL (ref 11.5–15.5)
IMMATURE GRANULOCYTES #: 0.02 E9/L
IMMATURE GRANULOCYTES %: 0.2 % (ref 0–5)
KETONES, URINE: NEGATIVE MG/DL
LDL CHOLESTEROL CALCULATED: 78 MG/DL (ref 0–99)
LEUKOCYTE ESTERASE, URINE: ABNORMAL
LYMPHOCYTES ABSOLUTE: 2.33 E9/L (ref 1.5–4)
LYMPHOCYTES RELATIVE PERCENT: 26.7 % (ref 20–42)
MCH RBC QN AUTO: 30 PG (ref 26–35)
MCHC RBC AUTO-ENTMCNC: 32.8 % (ref 32–34.5)
MCV RBC AUTO: 91.6 FL (ref 80–99.9)
MICROALBUMIN UR-MCNC: 19.8 MG/L
MONOCYTES ABSOLUTE: 0.67 E9/L (ref 0.1–0.95)
MONOCYTES RELATIVE PERCENT: 7.7 % (ref 2–12)
NEUTROPHILS ABSOLUTE: 5.34 E9/L (ref 1.8–7.3)
NEUTROPHILS RELATIVE PERCENT: 61.2 % (ref 43–80)
NITRITE, URINE: NEGATIVE
PDW BLD-RTO: 12.7 FL (ref 11.5–15)
PH UA: 5.5 (ref 5–9)
PLATELET # BLD: 176 E9/L (ref 130–450)
PMV BLD AUTO: 13.7 FL (ref 7–12)
POTASSIUM SERPL-SCNC: 4.5 MMOL/L (ref 3.5–5)
PROTEIN UA: NEGATIVE MG/DL
RBC # BLD: 4.5 E12/L (ref 3.5–5.5)
RBC UA: ABNORMAL /HPF (ref 0–2)
SODIUM BLD-SCNC: 141 MMOL/L (ref 132–146)
SPECIFIC GRAVITY UA: >=1.03 (ref 1–1.03)
T4 TOTAL: 6.7 MCG/DL (ref 4.5–11.7)
TOTAL PROTEIN: 7.1 G/DL (ref 6.4–8.3)
TRIGL SERPL-MCNC: 158 MG/DL (ref 0–149)
TSH SERPL DL<=0.05 MIU/L-ACNC: 2.89 UIU/ML (ref 0.27–4.2)
UROBILINOGEN, URINE: 0.2 E.U./DL
VITAMIN D 25-HYDROXY: 40 NG/ML (ref 30–100)
VLDLC SERPL CALC-MCNC: 32 MG/DL
WBC # BLD: 8.7 E9/L (ref 4.5–11.5)
WBC UA: ABNORMAL /HPF (ref 0–5)

## 2022-09-21 ENCOUNTER — OFFICE VISIT (OUTPATIENT)
Dept: PRIMARY CARE CLINIC | Age: 82
End: 2022-09-21
Payer: MEDICARE

## 2022-09-21 VITALS
DIASTOLIC BLOOD PRESSURE: 82 MMHG | WEIGHT: 206.8 LBS | BODY MASS INDEX: 38.05 KG/M2 | SYSTOLIC BLOOD PRESSURE: 134 MMHG | TEMPERATURE: 98 F | HEIGHT: 62 IN

## 2022-09-21 DIAGNOSIS — E03.9 ACQUIRED HYPOTHYROIDISM: ICD-10-CM

## 2022-09-21 DIAGNOSIS — Z23 NEED FOR INFLUENZA VACCINATION: Primary | ICD-10-CM

## 2022-09-21 DIAGNOSIS — M51.06 INTERVERTEBRAL LUMBAR DISC DISORDER WITH MYELOPATHY, LUMBAR REGION: Chronic | ICD-10-CM

## 2022-09-21 DIAGNOSIS — M81.0 AGE-RELATED OSTEOPOROSIS WITHOUT CURRENT PATHOLOGICAL FRACTURE: ICD-10-CM

## 2022-09-21 DIAGNOSIS — I10 ESSENTIAL HYPERTENSION: Chronic | ICD-10-CM

## 2022-09-21 DIAGNOSIS — E78.2 MIXED HYPERLIPIDEMIA: Chronic | ICD-10-CM

## 2022-09-21 DIAGNOSIS — E11.9 DIET-CONTROLLED DIABETES MELLITUS (HCC): ICD-10-CM

## 2022-09-21 PROCEDURE — 3044F HG A1C LEVEL LT 7.0%: CPT | Performed by: FAMILY MEDICINE

## 2022-09-21 PROCEDURE — 90694 VACC AIIV4 NO PRSRV 0.5ML IM: CPT | Performed by: FAMILY MEDICINE

## 2022-09-21 PROCEDURE — 99214 OFFICE O/P EST MOD 30 MIN: CPT | Performed by: FAMILY MEDICINE

## 2022-09-21 PROCEDURE — 1123F ACP DISCUSS/DSCN MKR DOCD: CPT | Performed by: FAMILY MEDICINE

## 2022-09-21 PROCEDURE — G0008 ADMIN INFLUENZA VIRUS VAC: HCPCS | Performed by: FAMILY MEDICINE

## 2022-09-21 RX ORDER — AMILORIDE HYDROCHLORIDE AND HYDROCHLOROTHIAZIDE 5; 50 MG/1; MG/1
1 TABLET ORAL DAILY
Qty: 90 TABLET | Refills: 3 | Status: SHIPPED | OUTPATIENT
Start: 2022-09-21

## 2022-09-21 RX ORDER — DICLOFENAC POTASSIUM 50 MG/1
50 TABLET, FILM COATED ORAL 2 TIMES DAILY
Qty: 90 TABLET | Refills: 3 | Status: SHIPPED | OUTPATIENT
Start: 2022-09-21

## 2022-09-21 RX ORDER — SIMVASTATIN 20 MG
20 TABLET ORAL NIGHTLY
Qty: 90 TABLET | Refills: 5 | Status: SHIPPED | OUTPATIENT
Start: 2022-09-21

## 2022-09-21 RX ORDER — GABAPENTIN 300 MG/1
300 CAPSULE ORAL 3 TIMES DAILY
Qty: 270 CAPSULE | Refills: 3 | Status: SHIPPED | OUTPATIENT
Start: 2022-09-21 | End: 2023-09-16

## 2022-09-21 RX ORDER — LOSARTAN POTASSIUM 50 MG/1
50 TABLET ORAL DAILY
Qty: 90 TABLET | Refills: 5 | Status: SHIPPED | OUTPATIENT
Start: 2022-09-21

## 2022-09-21 RX ORDER — METOPROLOL SUCCINATE 50 MG/1
50 TABLET, EXTENDED RELEASE ORAL 2 TIMES DAILY
Qty: 180 TABLET | Refills: 5 | Status: SHIPPED | OUTPATIENT
Start: 2022-09-21

## 2022-09-21 ASSESSMENT — ENCOUNTER SYMPTOMS
RESPIRATORY NEGATIVE: 1
BACK PAIN: 1
EYES NEGATIVE: 1
ALLERGIC/IMMUNOLOGIC NEGATIVE: 1
GASTROINTESTINAL NEGATIVE: 1

## 2022-09-21 ASSESSMENT — PATIENT HEALTH QUESTIONNAIRE - PHQ9
SUM OF ALL RESPONSES TO PHQ QUESTIONS 1-9: 0
SUM OF ALL RESPONSES TO PHQ9 QUESTIONS 1 & 2: 0
SUM OF ALL RESPONSES TO PHQ QUESTIONS 1-9: 0
SUM OF ALL RESPONSES TO PHQ QUESTIONS 1-9: 0
1. LITTLE INTEREST OR PLEASURE IN DOING THINGS: 0
2. FEELING DOWN, DEPRESSED OR HOPELESS: 0
SUM OF ALL RESPONSES TO PHQ QUESTIONS 1-9: 0

## 2022-09-21 NOTE — PROGRESS NOTES
22  Name: Jacy Qiu    : 1940    Sex: female    Age: [de-identified] y.o. Subjective:  Chief Complaint: Patient is here for    6mo ck  re   bp    chol  arth  back    sugar     Feels ok had  shto back  two kwees ago  last oen lasted 8 mo klab with  ghb  doen  Wto dwoj   6 lbs      Review of Systems   Constitutional: Negative. HENT: Negative. Eyes: Negative. Respiratory: Negative. Cardiovascular: Negative. Gastrointestinal: Negative. Endocrine: Negative. Genitourinary: Negative. Musculoskeletal:  Positive for back pain. Skin: Negative. Allergic/Immunologic: Negative. Neurological: Negative. Hematological: Negative. Psychiatric/Behavioral: Negative. Current Outpatient Medications:     losartan (COZAAR) 50 MG tablet, Take 1 tablet by mouth daily, Disp: 90 tablet, Rfl: 5    metoprolol succinate (TOPROL XL) 50 MG extended release tablet, Take 1 tablet by mouth in the morning and 1 tablet in the evening., Disp: 180 tablet, Rfl: 5    simvastatin (ZOCOR) 20 MG tablet, Take 1 tablet by mouth nightly 1 po qd --hs--cholesterol, Disp: 90 tablet, Rfl: 5    gabapentin (NEURONTIN) 300 MG capsule, Take 1 capsule by mouth 3 times daily for 360 days. , Disp: 270 capsule, Rfl: 3    aMILoride-HCTZ 5-50 MG TABS, Take 1 tablet by mouth daily, Disp: 90 tablet, Rfl: 3    diclofenac (CATAFLAM) 50 MG tablet, Take 1 tablet by mouth 2 times daily, Disp: 90 tablet, Rfl: 3    HYDROcodone-acetaminophen (NORCO) 5-325 MG per tablet, TAKE 1 TABLET BY MOUTH TWICE A DAY AS NEEDED FOR PAIN. START 2022 AND END 2022, Disp: , Rfl:     silver sulfADIAZINE (SILVADENE) 1 % cream, Apply topically daily. , Disp: 120 g, Rfl: 5    flurandrenolide (CORDRAN) 0.05 % CREA, Apply topically 2 times daily as needed, Disp: , Rfl:   Allergies   Allergen Reactions    Penicillins      Social History     Socioeconomic History    Marital status: Single     Spouse name: Not on file    Number of children: Not on file    Years of education: Not on file    Highest education level: Not on file   Occupational History    Not on file   Tobacco Use    Smoking status: Former     Years: 25.00     Types: Cigarettes     Quit date:      Years since quitting: 10.7    Smokeless tobacco: Never   Vaping Use    Vaping Use: Never used   Substance and Sexual Activity    Alcohol use: Never    Drug use: Never    Sexual activity: Never   Other Topics Concern    Not on file   Social History Narrative        HTN    PALPITATIONS    LIPID    R SHOUDLER OR  FROMFALL SAMANTHA    L-5 DISC AND CHRONIC JAVED NEURO    SMOKER---QUIT -12    EARLY HYPOTHYROID    NEG HEART CAHT     PNEUMONIA LLL 10-10    SHINGLES 1-12 LEFT LEG    LIVES ALONE---ONE SON [place]-------NO KIDS--WIFE   WITH CA CERVIX----new GF  Maria T    ONE DAUGHTER  AT 18    RETIRED -    COLON 2-07 [address] 8 YRS    R ING OR DR SHETTY     FIGHT FALLOPIAN CYST--SENA THEN CHRISTIAN 3-13    BACK PAIN---DR MAK  ANF REF SHOTS--X RAY LUMBAR OA---then dr Tabatha Nguyễn     shots  helped a lot    MRI 6-15 WITH MARKED SPINAL STENOSIS LUMBAR    EVAL DR SERRATO 8-15 ---OFFERED SURGERY BUT MAJOR AND PT DECLINED    SKIN BX 10-15    DIET DM 3-16    Back pain resolved with shot from dr Tabatha Nguyễn  --re do   --shot again       Social Determinants of Health     Financial Resource Strain: Not on file   Food Insecurity: Not on file   Transportation Needs: Not on file   Physical Activity: Inactive    Days of Exercise per Week: 0 days    Minutes of Exercise per Session: 0 min   Stress: Not on file   Social Connections: Not on file   Intimate Partner Violence: Not on file   Housing Stability: Not on file      Past Medical History:   Diagnosis Date    Chronic obstructive lung disease ([facility]     Diet-controlled diabetes mellitus ([facility] [facility] 2019    DJD (degenerative joint disease)     multiple joints    Heart disease     benign hypertensive heart disease without congestive heart failure    Hyperlipidemia     Hypertension     Hypothyroidism     Type 2 diabetes mellitus without complication (HCC)      No family history on file. No past surgical history on file. Vitals:    09/21/22 1219   BP: 134/82   Temp: 98 °F (36.7 °C)   Weight: 206 lb 12.8 oz (93.8 kg)   Height: 5' 1.5\" (1.562 m)       Objective:    Physical Exam  Vitals reviewed. Constitutional:       Appearance: Normal appearance. She is well-developed. HENT:      Head: Normocephalic. Right Ear: Tympanic membrane normal.      Left Ear: Tympanic membrane normal.      Nose: Nose normal.      Mouth/Throat:      Mouth: Mucous membranes are moist.   Eyes:      Pupils: Pupils are equal, round, and reactive to light. Cardiovascular:      Rate and Rhythm: Normal rate and regular rhythm. Pulmonary:      Effort: Pulmonary effort is normal.      Breath sounds: Normal breath sounds. Abdominal:      General: Bowel sounds are normal.      Palpations: Abdomen is soft. Musculoskeletal:         General: Normal range of motion. Cervical back: Normal range of motion. Skin:     General: Skin is warm. Neurological:      Mental Status: She is alert and oriented to person, place, and time. Psychiatric:         Behavior: Behavior normal.       Lesly Trinidad was seen today for hypertension. Diagnoses and all orders for this visit:    Need for influenza vaccination  -     Influenza, FLUAD, (age 72 y+), IM, Preservative Free, 0.5 mL    Mixed hyperlipidemia  -     simvastatin (ZOCOR) 20 MG tablet; Take 1 tablet by mouth nightly 1 po qd --hs--cholesterol    Essential hypertension  -     losartan (COZAAR) 50 MG tablet; Take 1 tablet by mouth daily  -     metoprolol succinate (TOPROL XL) 50 MG extended release tablet;  Take 1 tablet by mouth in the morning and 1 tablet in the evening.  -     aMILoride-HCTZ 5-50 MG TABS; Take 1 tablet by mouth daily    Intervertebral lumbar disc disorder with myelopathy, lumbar region  - gabapentin (NEURONTIN) 300 MG capsule; Take 1 capsule by mouth 3 times daily for 360 days. -     diclofenac (CATAFLAM) 50 MG tablet; Take 1 tablet by mouth 2 times daily      Comments: lose wt  exer    Exer  slip for hearing  aide order    I educated the patient about all medications. Make sure they were correct and educated  on the risk associated with missing meds or taking them incorrectly. A list of medications is being sent home with patient today. Check blood pressure at home twice a day. Low-salt low caffeine diet. Call if systolic blood pressure is above 908 and diastolic blood pressures above 85. Only use a upper arm digital cuff. Aggressive low-fat diet. Avoid red meats, greasy fried foods, dairy products. Avoid processed foods. Take cholesterol medications without food. I informed patient about the risk associated with noncompliance of medication and taking medications incorrectly. Appropriate follow-up with myself and all specialist.  Encourage family members to take active role in assisting with medications and medical care. If any confusion should develop to notify my office immediately to avoid risk of worsening medical condition    A great deal of time spent reviewing medications, diet, exercise, social issues. Also reviewing the chart before entering the room with patient and finishing charting after leaving patient's room. More than half of that time was spent face to face with the patient in counseling and coordinating care. Follow Up: Return in about 6 months (around 3/21/2023) for Lab Before.      Seen by:  Kiki Garduno DO

## 2023-03-15 DIAGNOSIS — I10 ESSENTIAL HYPERTENSION: Chronic | ICD-10-CM

## 2023-03-15 DIAGNOSIS — E78.2 MIXED HYPERLIPIDEMIA: Chronic | ICD-10-CM

## 2023-03-15 DIAGNOSIS — E03.9 ACQUIRED HYPOTHYROIDISM: ICD-10-CM

## 2023-03-15 DIAGNOSIS — E11.9 DIET-CONTROLLED DIABETES MELLITUS (HCC): ICD-10-CM

## 2023-03-15 DIAGNOSIS — M81.0 AGE-RELATED OSTEOPOROSIS WITHOUT CURRENT PATHOLOGICAL FRACTURE: ICD-10-CM

## 2023-03-15 LAB
ALBUMIN SERPL-MCNC: 4.3 G/DL (ref 3.5–5.2)
ALP SERPL-CCNC: 57 U/L (ref 35–104)
ALT SERPL-CCNC: 15 U/L (ref 0–32)
ANION GAP SERPL CALCULATED.3IONS-SCNC: 13 MMOL/L (ref 7–16)
AST SERPL-CCNC: 20 U/L (ref 0–31)
BACTERIA URNS QL MICRO: ABNORMAL /HPF
BASOPHILS # BLD: 0.04 E9/L (ref 0–0.2)
BASOPHILS NFR BLD: 0.5 % (ref 0–2)
BILIRUB SERPL-MCNC: 1.1 MG/DL (ref 0–1.2)
BILIRUB UR QL STRIP: NEGATIVE
BUN SERPL-MCNC: 16 MG/DL (ref 6–23)
CALCIUM SERPL-MCNC: 9.4 MG/DL (ref 8.6–10.2)
CHLORIDE SERPL-SCNC: 100 MMOL/L (ref 98–107)
CHOLESTEROL, TOTAL: 155 MG/DL (ref 0–199)
CLARITY UR: ABNORMAL
CO2 SERPL-SCNC: 27 MMOL/L (ref 22–29)
COLOR UR: YELLOW
CREAT SERPL-MCNC: 1 MG/DL (ref 0.5–1)
EOSINOPHIL # BLD: 0.26 E9/L (ref 0.05–0.5)
EOSINOPHIL NFR BLD: 3.5 % (ref 0–6)
ERYTHROCYTE [DISTWIDTH] IN BLOOD BY AUTOMATED COUNT: 12.8 FL (ref 11.5–15)
GLUCOSE SERPL-MCNC: 98 MG/DL (ref 74–99)
GLUCOSE UR STRIP-MCNC: NEGATIVE MG/DL
HBA1C MFR BLD: 5.5 % (ref 4–5.6)
HCT VFR BLD AUTO: 41.8 % (ref 34–48)
HDLC SERPL-MCNC: 55 MG/DL
HGB BLD-MCNC: 13.3 G/DL (ref 11.5–15.5)
HGB UR QL STRIP: NEGATIVE
IMM GRANULOCYTES # BLD: 0.01 E9/L
IMM GRANULOCYTES NFR BLD: 0.1 % (ref 0–5)
KETONES UR STRIP-MCNC: NEGATIVE MG/DL
LDLC SERPL CALC-MCNC: 72 MG/DL (ref 0–99)
LEUKOCYTE ESTERASE UR QL STRIP: ABNORMAL
LYMPHOCYTES # BLD: 2.22 E9/L (ref 1.5–4)
LYMPHOCYTES NFR BLD: 29.9 % (ref 20–42)
MCH RBC QN AUTO: 30.1 PG (ref 26–35)
MCHC RBC AUTO-ENTMCNC: 31.8 % (ref 32–34.5)
MCV RBC AUTO: 94.6 FL (ref 80–99.9)
MICROALBUMIN UR-MCNC: 33.5 MG/L
MONOCYTES # BLD: 0.57 E9/L (ref 0.1–0.95)
MONOCYTES NFR BLD: 7.7 % (ref 2–12)
NEUTROPHILS # BLD: 4.33 E9/L (ref 1.8–7.3)
NEUTS SEG NFR BLD: 58.3 % (ref 43–80)
NITRITE UR QL STRIP: NEGATIVE
PH UR STRIP: 5.5 [PH] (ref 5–9)
PLATELET # BLD AUTO: 191 E9/L (ref 130–450)
PMV BLD AUTO: 12.7 FL (ref 7–12)
POTASSIUM SERPL-SCNC: 3.9 MMOL/L (ref 3.5–5)
PROT SERPL-MCNC: 7.3 G/DL (ref 6.4–8.3)
PROT UR STRIP-MCNC: ABNORMAL MG/DL
RBC # BLD AUTO: 4.42 E12/L (ref 3.5–5.5)
RBC #/AREA URNS HPF: ABNORMAL /HPF (ref 0–2)
SODIUM SERPL-SCNC: 140 MMOL/L (ref 132–146)
SP GR UR STRIP: >=1.03 (ref 1–1.03)
T4 SERPL-MCNC: 6.6 MCG/DL (ref 4.5–11.7)
TRIGL SERPL-MCNC: 139 MG/DL (ref 0–149)
TSH SERPL-MCNC: 3.54 UIU/ML (ref 0.27–4.2)
UROBILINOGEN UR STRIP-ACNC: 0.2 E.U./DL
VITAMIN D 25-HYDROXY: 38 NG/ML (ref 30–100)
VLDLC SERPL CALC-MCNC: 28 MG/DL
WBC # BLD: 7.4 E9/L (ref 4.5–11.5)
WBC #/AREA URNS HPF: ABNORMAL /HPF (ref 0–5)

## 2023-03-22 ENCOUNTER — OFFICE VISIT (OUTPATIENT)
Dept: PRIMARY CARE CLINIC | Age: 83
End: 2023-03-22
Payer: MEDICARE

## 2023-03-22 VITALS
SYSTOLIC BLOOD PRESSURE: 132 MMHG | HEIGHT: 62 IN | WEIGHT: 203.2 LBS | TEMPERATURE: 98.9 F | BODY MASS INDEX: 37.39 KG/M2 | DIASTOLIC BLOOD PRESSURE: 82 MMHG

## 2023-03-22 DIAGNOSIS — E78.2 MIXED HYPERLIPIDEMIA: Chronic | ICD-10-CM

## 2023-03-22 DIAGNOSIS — Z00.00 MEDICARE ANNUAL WELLNESS VISIT, SUBSEQUENT: Primary | ICD-10-CM

## 2023-03-22 DIAGNOSIS — M15.9 PRIMARY OSTEOARTHRITIS INVOLVING MULTIPLE JOINTS: Chronic | ICD-10-CM

## 2023-03-22 DIAGNOSIS — M81.0 AGE-RELATED OSTEOPOROSIS WITHOUT CURRENT PATHOLOGICAL FRACTURE: ICD-10-CM

## 2023-03-22 DIAGNOSIS — I10 ESSENTIAL HYPERTENSION: Chronic | ICD-10-CM

## 2023-03-22 DIAGNOSIS — E03.9 ACQUIRED HYPOTHYROIDISM: ICD-10-CM

## 2023-03-22 DIAGNOSIS — R73.03 PRE-DIABETES: Chronic | ICD-10-CM

## 2023-03-22 DIAGNOSIS — D22.9 ATYPICAL MOLE: ICD-10-CM

## 2023-03-22 PROCEDURE — G0439 PPPS, SUBSEQ VISIT: HCPCS | Performed by: FAMILY MEDICINE

## 2023-03-22 PROCEDURE — 1123F ACP DISCUSS/DSCN MKR DOCD: CPT | Performed by: FAMILY MEDICINE

## 2023-03-22 PROCEDURE — 3079F DIAST BP 80-89 MM HG: CPT | Performed by: FAMILY MEDICINE

## 2023-03-22 PROCEDURE — 3075F SYST BP GE 130 - 139MM HG: CPT | Performed by: FAMILY MEDICINE

## 2023-03-22 ASSESSMENT — PATIENT HEALTH QUESTIONNAIRE - PHQ9
SUM OF ALL RESPONSES TO PHQ QUESTIONS 1-9: 0
1. LITTLE INTEREST OR PLEASURE IN DOING THINGS: 0
SUM OF ALL RESPONSES TO PHQ QUESTIONS 1-9: 0
2. FEELING DOWN, DEPRESSED OR HOPELESS: 0
SUM OF ALL RESPONSES TO PHQ9 QUESTIONS 1 & 2: 0

## 2023-03-22 ASSESSMENT — LIFESTYLE VARIABLES: HOW MANY STANDARD DRINKS CONTAINING ALCOHOL DO YOU HAVE ON A TYPICAL DAY: PATIENT DOES NOT DRINK

## 2023-03-22 NOTE — PROGRESS NOTES
spent reviewing medications, diet, exercise, social issues. Also reviewing the chart before entering the room with patient and finishing charting after leaving patient's room. More than half of that time was spent face to face with the patient in counseling and coordinating care. I informed patient about the risk associated with noncompliance of medication and taking medications incorrectly. Appropriate follow-up with myself and all specialist.  Encourage family members to take active role in assisting with medications and medical care.   If any confusion should develop to notify my office immediately to avoid risk of worsening medical condition        Everton Perkins [address], DO

## 2023-03-22 NOTE — PATIENT INSTRUCTIONS
diabetes, high blood pressure, and high cholesterol. If you think you may have a problem with alcohol or drug use, talk to your doctor. Medicines    Take your medicines exactly as prescribed. Call your doctor if you think you are having a problem with your medicine.     If your doctor recommends aspirin, take the amount directed each day. Make sure you take aspirin and not another kind of pain reliever, such as acetaminophen (Tylenol). When should you call for help? Call 911 if you have symptoms of a heart attack. These may include:    Chest pain or pressure, or a strange feeling in the chest.     Sweating.     Shortness of breath.     Pain, pressure, or a strange feeling in the back, neck, jaw, or upper belly or in one or both shoulders or arms.     Lightheadedness or sudden weakness.     A fast or irregular heartbeat. After you call 911, the  may tell you to chew 1 adult-strength or 2 to 4 low-dose aspirin. Wait for an ambulance. Do not try to drive yourself. Watch closely for changes in your health, and be sure to contact your doctor if you have any problems. Where can you learn more? Go to http://[employer].watts.com/ and enter F075 to learn more about \"A Healthy Heart: Care Instructions. \"  Current as of: September 7, 2022               Content Version: 13.6  © 2006-2023 Healthwise, [employer]. Care instructions adapted under license by [facility] ([facility]). If you have questions about a medical condition or this instruction, always ask your healthcare professional. [place] [address] any warranty or liability for your use of this information. Personalized Preventive Plan for [employer] - 3/22/2023  Medicare offers a range of preventive health benefits. Some of the tests and screenings are paid in full while other may be subject to a deductible, co-insurance, and/or copay.     Some of these benefits include a comprehensive review of your medical history

## 2023-05-01 ENCOUNTER — OFFICE VISIT (OUTPATIENT)
Dept: FAMILY MEDICINE CLINIC | Age: 83
End: 2023-05-01
Payer: MEDICARE

## 2023-05-01 VITALS
HEART RATE: 54 BPM | BODY MASS INDEX: 37.1 KG/M2 | OXYGEN SATURATION: 93 % | DIASTOLIC BLOOD PRESSURE: 82 MMHG | TEMPERATURE: 97.8 F | SYSTOLIC BLOOD PRESSURE: 164 MMHG | WEIGHT: 199.6 LBS

## 2023-05-01 DIAGNOSIS — I10 ELEVATED BLOOD PRESSURE READING IN OFFICE WITH DIAGNOSIS OF HYPERTENSION: Primary | ICD-10-CM

## 2023-05-01 PROCEDURE — 99213 OFFICE O/P EST LOW 20 MIN: CPT

## 2023-05-01 PROCEDURE — 3079F DIAST BP 80-89 MM HG: CPT

## 2023-05-01 PROCEDURE — 3077F SYST BP >= 140 MM HG: CPT

## 2023-05-01 PROCEDURE — 1123F ACP DISCUSS/DSCN MKR DOCD: CPT

## 2023-05-01 NOTE — PROGRESS NOTES
Chief Complaint       Hypertension (Bp was 223/98 on Thursday at oncologist appt)    History of Present Illness   Source of history provided by:  patient. Jia Palencia is a 82 y.o. old female presenting to the walk in clinic for evaluation of elevated blood pressure, which pt first noticed while at oncology office 5 days ago she reports the reading was around 223/98. She was extremely anxious about her melanoma diagnosis and thinks she had some white coat HTN. She then took a reading at home it was still elevated but she cannot recall, cuff is 22years old. Pt denies any current HA, CP, SOB, urinary difficulties, neck stiffness, dizziness, ringing of the ears, visual changes, syncope, or lethargy. Pt is currently being treated for HTN with losartan and Metoprolol. Reports being complaint with all medications. ROS    Unless otherwise stated in this report or unable to obtain because of the patient's clinical or mental status as evidenced by the medical record, this patients's positive and negative responses for Review of Systems, constitutional, psych, eyes, ENT, cardiovascular, respiratory, gastrointestinal, neurological, genitourinary, musculoskeletal, integument systems and systems related to the presenting problem are either stated in the preceding or were not pertinent or were negative for the symptoms and/or complaints related to the medical problem. Physical Exam         VS:  BP (!) 164/82   Pulse 54   Temp 97.8 °F (36.6 °C) (Temporal)   Wt 199 lb 9.6 oz (90.5 kg)   SpO2 93%   BMI 37.10 kg/m²    Oxygen Saturation Interpretation: Normal.    Constitutional:  Alert, development consistent with age. Eyes:  PERRL, EOMI, no discharge or conjunctival injection. Neck:  Normal ROM. Supple. Lungs:  Clear to auscultation and breath sounds equal.  Heart:  Regular rate and rhythm, normal heart sounds, without pathological murmurs, ectopy, gallops, or rubs.  LEs without clubbing, edema, or

## 2023-08-23 ENCOUNTER — TELEPHONE (OUTPATIENT)
Dept: PRIMARY CARE CLINIC | Age: 83
End: 2023-08-23

## 2023-08-23 NOTE — TELEPHONE ENCOUNTER
Patient asking for CT results from [address] [place] [address],[address] last week. Advised to call the ordering physician's office.   If they can't help call back

## 2023-09-13 DIAGNOSIS — E78.2 MIXED HYPERLIPIDEMIA: Chronic | ICD-10-CM

## 2023-09-13 DIAGNOSIS — M81.0 AGE-RELATED OSTEOPOROSIS WITHOUT CURRENT PATHOLOGICAL FRACTURE: ICD-10-CM

## 2023-09-13 DIAGNOSIS — I10 ESSENTIAL HYPERTENSION: Chronic | ICD-10-CM

## 2023-09-13 DIAGNOSIS — E03.9 ACQUIRED HYPOTHYROIDISM: ICD-10-CM

## 2023-09-13 DIAGNOSIS — R73.03 PRE-DIABETES: Chronic | ICD-10-CM

## 2023-09-13 LAB
ABSOLUTE IMMATURE GRANULOCYTE: <0.03 K/UL (ref 0–0.58)
ALBUMIN SERPL-MCNC: 4 G/DL (ref 3.5–5.2)
ALP BLD-CCNC: 54 U/L (ref 35–104)
ALT SERPL-CCNC: 13 U/L (ref 0–32)
ANION GAP SERPL CALCULATED.3IONS-SCNC: 16 MMOL/L (ref 7–16)
AST SERPL-CCNC: 16 U/L (ref 0–31)
BACTERIA: ABNORMAL
BASOPHILS ABSOLUTE: 0.02 K/UL (ref 0–0.2)
BASOPHILS RELATIVE PERCENT: 0 % (ref 0–2)
BILIRUB SERPL-MCNC: 1 MG/DL (ref 0–1.2)
BILIRUBIN URINE: NEGATIVE
BUN BLDV-MCNC: 22 MG/DL (ref 6–23)
CALCIUM SERPL-MCNC: 9.7 MG/DL (ref 8.6–10.2)
CHLORIDE BLD-SCNC: 104 MMOL/L (ref 98–107)
CHOLESTEROL: 184 MG/DL
CO2: 23 MMOL/L (ref 22–29)
COLOR: YELLOW
CREAT SERPL-MCNC: 0.9 MG/DL (ref 0.5–1)
EOSINOPHILS ABSOLUTE: 0.31 K/UL (ref 0.05–0.5)
EOSINOPHILS RELATIVE PERCENT: 5 % (ref 0–6)
GFR SERPL CREATININE-BSD FRML MDRD: >60 ML/MIN/1.73M2
GLUCOSE BLD-MCNC: 94 MG/DL (ref 74–99)
GLUCOSE URINE: NEGATIVE MG/DL
HBA1C MFR BLD: 5.7 % (ref 4–5.6)
HCT VFR BLD CALC: 41.3 % (ref 34–48)
HDLC SERPL-MCNC: 51 MG/DL
HEMOGLOBIN: 13.2 G/DL (ref 11.5–15.5)
IMMATURE GRANULOCYTES: 0 % (ref 0–5)
KETONES, URINE: NEGATIVE MG/DL
LDL CHOLESTEROL: 103 MG/DL
LEUKOCYTE ESTERASE, URINE: ABNORMAL
LYMPHOCYTES ABSOLUTE: 2.07 K/UL (ref 1.5–4)
LYMPHOCYTES RELATIVE PERCENT: 32 % (ref 20–42)
MCH RBC QN AUTO: 29.7 PG (ref 26–35)
MCHC RBC AUTO-ENTMCNC: 32 G/DL (ref 32–34.5)
MCV RBC AUTO: 93 FL (ref 80–99.9)
MONOCYTES ABSOLUTE: 0.48 K/UL (ref 0.1–0.95)
MONOCYTES RELATIVE PERCENT: 8 % (ref 2–12)
NEUTROPHILS ABSOLUTE: 3.52 K/UL (ref 1.8–7.3)
NEUTROPHILS RELATIVE PERCENT: 55 % (ref 43–80)
NITRITE, URINE: NEGATIVE
PDW BLD-RTO: 12.5 % (ref 11.5–15)
PH UA: 6 (ref 5–9)
PLATELET, FLUORESCENCE: 173 K/UL (ref 130–450)
PMV BLD AUTO: 14 FL (ref 7–12)
POTASSIUM SERPL-SCNC: 3.9 MMOL/L (ref 3.5–5)
PROTEIN UA: NEGATIVE MG/DL
RBC # BLD: 4.44 M/UL (ref 3.5–5.5)
RBC UA: ABNORMAL /HPF
SODIUM BLD-SCNC: 143 MMOL/L (ref 132–146)
SPECIFIC GRAVITY UA: 1.02 (ref 1–1.03)
T4 TOTAL: 7.2 UG/DL (ref 4.5–11.7)
TOTAL PROTEIN: 7.1 G/DL (ref 6.4–8.3)
TRIGL SERPL-MCNC: 149 MG/DL
TSH SERPL DL<=0.05 MIU/L-ACNC: 3.09 UIU/ML (ref 0.27–4.2)
TURBIDITY: CLEAR
URINE HGB: NEGATIVE
UROBILINOGEN, URINE: 0.2 EU/DL (ref 0–1)
VITAMIN D 25-HYDROXY: 33 NG/ML (ref 30–100)
VLDLC SERPL CALC-MCNC: 30 MG/DL
WBC # BLD: 6.4 K/UL (ref 4.5–11.5)
WBC UA: ABNORMAL /HPF

## 2023-09-25 ENCOUNTER — OFFICE VISIT (OUTPATIENT)
Dept: PRIMARY CARE CLINIC | Age: 83
End: 2023-09-25
Payer: MEDICARE

## 2023-09-25 VITALS
SYSTOLIC BLOOD PRESSURE: 138 MMHG | WEIGHT: 201 LBS | TEMPERATURE: 98.3 F | DIASTOLIC BLOOD PRESSURE: 83 MMHG | BODY MASS INDEX: 37.36 KG/M2

## 2023-09-25 DIAGNOSIS — E11.9 DIET-CONTROLLED DIABETES MELLITUS (HCC): ICD-10-CM

## 2023-09-25 DIAGNOSIS — E78.2 MIXED HYPERLIPIDEMIA: Chronic | ICD-10-CM

## 2023-09-25 DIAGNOSIS — M81.0 AGE-RELATED OSTEOPOROSIS WITHOUT CURRENT PATHOLOGICAL FRACTURE: Chronic | ICD-10-CM

## 2023-09-25 DIAGNOSIS — M15.9 PRIMARY OSTEOARTHRITIS INVOLVING MULTIPLE JOINTS: Chronic | ICD-10-CM

## 2023-09-25 DIAGNOSIS — M51.06 INTERVERTEBRAL LUMBAR DISC DISORDER WITH MYELOPATHY, LUMBAR REGION: Chronic | ICD-10-CM

## 2023-09-25 DIAGNOSIS — E53.8 VITAMIN B 12 DEFICIENCY: ICD-10-CM

## 2023-09-25 DIAGNOSIS — Z23 NEED FOR INFLUENZA VACCINATION: ICD-10-CM

## 2023-09-25 DIAGNOSIS — C43.59 MALIGNANT MELANOMA OF TORSO EXCLUDING BREAST (HCC): ICD-10-CM

## 2023-09-25 DIAGNOSIS — R73.03 PRE-DIABETES: Chronic | ICD-10-CM

## 2023-09-25 DIAGNOSIS — E03.9 ACQUIRED HYPOTHYROIDISM: ICD-10-CM

## 2023-09-25 DIAGNOSIS — I10 ESSENTIAL HYPERTENSION: Primary | Chronic | ICD-10-CM

## 2023-09-25 PROCEDURE — 3075F SYST BP GE 130 - 139MM HG: CPT | Performed by: FAMILY MEDICINE

## 2023-09-25 PROCEDURE — 1123F ACP DISCUSS/DSCN MKR DOCD: CPT | Performed by: FAMILY MEDICINE

## 2023-09-25 PROCEDURE — 90694 VACC AIIV4 NO PRSRV 0.5ML IM: CPT | Performed by: FAMILY MEDICINE

## 2023-09-25 PROCEDURE — 3079F DIAST BP 80-89 MM HG: CPT | Performed by: FAMILY MEDICINE

## 2023-09-25 PROCEDURE — G0008 ADMIN INFLUENZA VIRUS VAC: HCPCS | Performed by: FAMILY MEDICINE

## 2023-09-25 PROCEDURE — 3044F HG A1C LEVEL LT 7.0%: CPT | Performed by: FAMILY MEDICINE

## 2023-09-25 PROCEDURE — 99214 OFFICE O/P EST MOD 30 MIN: CPT | Performed by: FAMILY MEDICINE

## 2023-09-25 RX ORDER — GABAPENTIN 300 MG/1
300 CAPSULE ORAL 2 TIMES DAILY
Qty: 180 CAPSULE | Refills: 3 | Status: SHIPPED | OUTPATIENT
Start: 2023-09-25 | End: 2024-09-19

## 2023-09-25 SDOH — ECONOMIC STABILITY: HOUSING INSECURITY
IN THE LAST 12 MONTHS, WAS THERE A TIME WHEN YOU DID NOT HAVE A STEADY PLACE TO SLEEP OR SLEPT IN A SHELTER (INCLUDING NOW)?: NO

## 2023-09-25 SDOH — ECONOMIC STABILITY: FOOD INSECURITY: WITHIN THE PAST 12 MONTHS, THE FOOD YOU BOUGHT JUST DIDN'T LAST AND YOU DIDN'T HAVE MONEY TO GET MORE.: NEVER TRUE

## 2023-09-25 SDOH — ECONOMIC STABILITY: INCOME INSECURITY: HOW HARD IS IT FOR YOU TO PAY FOR THE VERY BASICS LIKE FOOD, HOUSING, MEDICAL CARE, AND HEATING?: NOT HARD AT ALL

## 2023-09-25 SDOH — ECONOMIC STABILITY: FOOD INSECURITY: WITHIN THE PAST 12 MONTHS, YOU WORRIED THAT YOUR FOOD WOULD RUN OUT BEFORE YOU GOT MONEY TO BUY MORE.: NEVER TRUE

## 2023-09-25 ASSESSMENT — ENCOUNTER SYMPTOMS
RESPIRATORY NEGATIVE: 1
GASTROINTESTINAL NEGATIVE: 1
EYES NEGATIVE: 1
ALLERGIC/IMMUNOLOGIC NEGATIVE: 1

## 2023-09-25 ASSESSMENT — PATIENT HEALTH QUESTIONNAIRE - PHQ9
SUM OF ALL RESPONSES TO PHQ QUESTIONS 1-9: 0
SUM OF ALL RESPONSES TO PHQ QUESTIONS 1-9: 0
2. FEELING DOWN, DEPRESSED OR HOPELESS: 0
SUM OF ALL RESPONSES TO PHQ QUESTIONS 1-9: 0
SUM OF ALL RESPONSES TO PHQ QUESTIONS 1-9: 0
SUM OF ALL RESPONSES TO PHQ9 QUESTIONS 1 & 2: 0
1. LITTLE INTEREST OR PLEASURE IN DOING THINGS: 0

## 2023-09-25 NOTE — PROGRESS NOTES
23  Name: Michael Qiu    : 1940    Sex: female    Age: 82 y.o. Subjective:  Chief Complaint: Patient is here for 6 mo ck     bp chol  arth  back sugar    melanoma    Back  stable  get  painmed  and shots   that help no  cp or sob   '  Had ruq menaonm a otu ct dsuiussed     nodule lung and niko follow wth  ccf  Sl swelling    feet neod f  of   day    dc salt        Review of Systems   Constitutional: Negative. HENT: Negative. Eyes: Negative. Respiratory: Negative. Cardiovascular: Negative. Gastrointestinal: Negative. Endocrine: Negative. Genitourinary: Negative. Musculoskeletal: Negative. Skin: Negative. Allergic/Immunologic: Negative. Neurological: Negative. Hematological: Negative. Psychiatric/Behavioral: Negative. Current Outpatient Medications:     gabapentin (NEURONTIN) 300 MG capsule, Take 1 capsule by mouth in the morning and at bedtime for 360 days. , Disp: 180 capsule, Rfl: 3    losartan (COZAAR) 50 MG tablet, Take 1 tablet by mouth daily, Disp: 90 tablet, Rfl: 5    metoprolol succinate (TOPROL XL) 50 MG extended release tablet, Take 1 tablet by mouth in the morning and 1 tablet in the evening., Disp: 180 tablet, Rfl: 5    simvastatin (ZOCOR) 20 MG tablet, Take 1 tablet by mouth nightly 1 po qd --hs--cholesterol, Disp: 90 tablet, Rfl: 5    aMILoride-HCTZ 5-50 MG TABS, Take 1 tablet by mouth daily, Disp: 90 tablet, Rfl: 3    diclofenac (CATAFLAM) 50 MG tablet, Take 1 tablet by mouth 2 times daily, Disp: 90 tablet, Rfl: 3    HYDROcodone-acetaminophen (NORCO) 5-325 MG per tablet, TAKE 1 TABLET BY MOUTH TWICE A DAY AS NEEDED FOR PAIN. START 2022 AND END 2022, Disp: , Rfl:     silver sulfADIAZINE (SILVADENE) 1 % cream, Apply topically daily. , Disp: 120 g, Rfl: 5    flurandrenolide (CORDRAN) 0.05 % CREA, Apply topically 2 times daily as needed, Disp: , Rfl:   Allergies   Allergen Reactions    Penicillins      Social

## 2023-10-30 DIAGNOSIS — M51.06 INTERVERTEBRAL LUMBAR DISC DISORDER WITH MYELOPATHY, LUMBAR REGION: Chronic | ICD-10-CM

## 2023-10-30 DIAGNOSIS — I10 ESSENTIAL HYPERTENSION: Chronic | ICD-10-CM

## 2023-10-30 RX ORDER — DICLOFENAC POTASSIUM 50 MG/1
50 TABLET, FILM COATED ORAL 2 TIMES DAILY
Qty: 90 TABLET | Refills: 3 | Status: SHIPPED | OUTPATIENT
Start: 2023-10-30

## 2023-10-30 RX ORDER — AMILORIDE HYDROCHLORIDE AND HYDROCHLOROTHIAZIDE 5; 50 MG/1; MG/1
1 TABLET ORAL DAILY
Qty: 90 TABLET | Refills: 3 | Status: SHIPPED | OUTPATIENT
Start: 2023-10-30

## 2023-11-20 ENCOUNTER — TELEPHONE (OUTPATIENT)
Dept: PRIMARY CARE CLINIC | Age: 83
End: 2023-11-20

## 2023-11-20 NOTE — TELEPHONE ENCOUNTER
The pt is calling to see about getting the results from the [facility] us extremity she had done at [facility] the results are scanned into her chart

## 2023-11-24 DIAGNOSIS — E78.2 MIXED HYPERLIPIDEMIA: Chronic | ICD-10-CM

## 2023-11-24 DIAGNOSIS — I10 ESSENTIAL HYPERTENSION: Chronic | ICD-10-CM

## 2023-11-24 RX ORDER — SIMVASTATIN 20 MG
20 TABLET ORAL NIGHTLY
Qty: 90 TABLET | Refills: 5 | Status: SHIPPED | OUTPATIENT
Start: 2023-11-24

## 2023-11-24 RX ORDER — LOSARTAN POTASSIUM 50 MG/1
50 TABLET ORAL DAILY
Qty: 90 TABLET | Refills: 5 | Status: SHIPPED | OUTPATIENT
Start: 2023-11-24

## 2023-11-24 RX ORDER — METOPROLOL SUCCINATE 50 MG/1
50 TABLET, EXTENDED RELEASE ORAL
Qty: 180 TABLET | Refills: 5 | Status: SHIPPED | OUTPATIENT
Start: 2023-11-24

## 2024-02-14 ENCOUNTER — OFFICE VISIT (OUTPATIENT)
Dept: FAMILY MEDICINE CLINIC | Age: 84
End: 2024-02-14
Payer: MEDICARE

## 2024-02-14 VITALS
BODY MASS INDEX: 38.33 KG/M2 | WEIGHT: 203 LBS | SYSTOLIC BLOOD PRESSURE: 128 MMHG | DIASTOLIC BLOOD PRESSURE: 70 MMHG | HEART RATE: 76 BPM | TEMPERATURE: 98.3 F | HEIGHT: 61 IN | OXYGEN SATURATION: 93 %

## 2024-02-14 DIAGNOSIS — J06.9 ACUTE UPPER RESPIRATORY INFECTION, UNSPECIFIED: ICD-10-CM

## 2024-02-14 DIAGNOSIS — U07.1 COVID-19: Primary | ICD-10-CM

## 2024-02-14 LAB
INFLUENZA A ANTIBODY: NEGATIVE
INFLUENZA B ANTIBODY: NEGATIVE
Lab: ABNORMAL
PERFORMING INSTRUMENT: ABNORMAL
QC PASS/FAIL: ABNORMAL
S PYO AG THROAT QL: NORMAL
SARS-COV-2, POC: DETECTED

## 2024-02-14 PROCEDURE — 87426 SARSCOV CORONAVIRUS AG IA: CPT | Performed by: PHYSICIAN ASSISTANT

## 2024-02-14 PROCEDURE — 87880 STREP A ASSAY W/OPTIC: CPT | Performed by: PHYSICIAN ASSISTANT

## 2024-02-14 PROCEDURE — 3078F DIAST BP <80 MM HG: CPT | Performed by: PHYSICIAN ASSISTANT

## 2024-02-14 PROCEDURE — 99214 OFFICE O/P EST MOD 30 MIN: CPT | Performed by: PHYSICIAN ASSISTANT

## 2024-02-14 PROCEDURE — 3074F SYST BP LT 130 MM HG: CPT | Performed by: PHYSICIAN ASSISTANT

## 2024-02-14 PROCEDURE — 1123F ACP DISCUSS/DSCN MKR DOCD: CPT | Performed by: PHYSICIAN ASSISTANT

## 2024-02-14 PROCEDURE — 87804 INFLUENZA ASSAY W/OPTIC: CPT | Performed by: PHYSICIAN ASSISTANT

## 2024-02-14 RX ORDER — METHYLPREDNISOLONE 4 MG/1
TABLET ORAL
Qty: 1 KIT | Refills: 0 | Status: SHIPPED | OUTPATIENT
Start: 2024-02-14

## 2024-02-14 RX ORDER — BENZONATATE 100 MG/1
100 CAPSULE ORAL 3 TIMES DAILY PRN
Qty: 21 CAPSULE | Refills: 0 | Status: SHIPPED | OUTPATIENT
Start: 2024-02-14 | End: 2024-02-21

## 2024-02-14 NOTE — PROGRESS NOTES
24  Colleen Qiu : 1940 Sex: female  Age 83 y.o.      Subjective:  Chief Complaint   Patient presents with    Headache    Sinus Problem     Would like to see provider prior to testing         HPI:   HPI  Colleen Qiu , 83 y.o. female presents to express care for evaluation of headache, sinus congestion, drainage.  The patient has had the symptoms ongoing for the last couple of days.  Started on Tuesday morning.  The patient states that she had a little bit of a itchy throat.  The patient has had some increased congestion, drainage, cough and has had some phlegm produced.  The patient is not having any fevers, chills.  The patient did have a flu shot.  The patient did have COVID-vaccine and boosters.  The patient is not having any GI symptoms.        ROS:   Unless otherwise stated in this report the patient's positive and negative responses for review of systems for constitutional, eyes, ENT, cardiovascular, respiratory, gastrointestinal, neurological, , musculoskeletal, and integument systems and related systems to the presenting problem are either stated in the history of present illness or were not pertinent or were negative for the symptoms and/or complaints related to the presenting medical problem.  Positives and pertinent negatives as per HPI.  All others reviewed and are negative.      PMH:     Past Medical History:   Diagnosis Date    Atypical mole 3/16/2021    Chronic obstructive lung disease (HCC)     Diet-controlled diabetes mellitus (HCC) 2019    DJD (degenerative joint disease)     multiple joints    Heart disease     benign hypertensive heart disease without congestive heart failure    Hyperlipidemia     Hypertension     Hypothyroidism     Type 2 diabetes mellitus without complication (HCC)        History reviewed. No pertinent surgical history.    History reviewed. No pertinent family history.    Medications:     Current Outpatient Medications:

## 2024-02-23 DIAGNOSIS — E11.9 DIET-CONTROLLED DIABETES MELLITUS (HCC): ICD-10-CM

## 2024-02-23 DIAGNOSIS — E53.8 VITAMIN B 12 DEFICIENCY: ICD-10-CM

## 2024-02-23 DIAGNOSIS — E78.2 MIXED HYPERLIPIDEMIA: Chronic | ICD-10-CM

## 2024-02-23 DIAGNOSIS — E03.9 ACQUIRED HYPOTHYROIDISM: ICD-10-CM

## 2024-02-23 DIAGNOSIS — M81.0 AGE-RELATED OSTEOPOROSIS WITHOUT CURRENT PATHOLOGICAL FRACTURE: Chronic | ICD-10-CM

## 2024-02-23 DIAGNOSIS — I10 ESSENTIAL HYPERTENSION: Chronic | ICD-10-CM

## 2024-02-23 DIAGNOSIS — C43.59 MALIGNANT MELANOMA OF TORSO EXCLUDING BREAST (HCC): ICD-10-CM

## 2024-02-23 LAB
ABSOLUTE IMMATURE GRANULOCYTE: 0.05 K/UL (ref 0–0.58)
ALBUMIN SERPL-MCNC: 3.6 G/DL (ref 3.5–5.2)
ALP BLD-CCNC: 58 U/L (ref 35–104)
ALT SERPL-CCNC: 24 U/L (ref 0–32)
ANION GAP SERPL CALCULATED.3IONS-SCNC: 13 MMOL/L (ref 7–16)
AST SERPL-CCNC: 16 U/L (ref 0–31)
BASOPHILS ABSOLUTE: 0.02 K/UL (ref 0–0.2)
BASOPHILS RELATIVE PERCENT: 0 % (ref 0–2)
BILIRUB SERPL-MCNC: 0.9 MG/DL (ref 0–1.2)
BILIRUBIN URINE: NEGATIVE
BUN BLDV-MCNC: 19 MG/DL (ref 6–23)
CALCIUM SERPL-MCNC: 9.1 MG/DL (ref 8.6–10.2)
CHLORIDE BLD-SCNC: 100 MMOL/L (ref 98–107)
CHOLESTEROL: 164 MG/DL
CO2: 28 MMOL/L (ref 22–29)
COLOR: YELLOW
CREAT SERPL-MCNC: 0.9 MG/DL (ref 0.5–1)
EOSINOPHILS ABSOLUTE: 0.32 K/UL (ref 0.05–0.5)
EOSINOPHILS RELATIVE PERCENT: 4 % (ref 0–6)
GFR SERPL CREATININE-BSD FRML MDRD: >60 ML/MIN/1.73M2
GLUCOSE BLD-MCNC: 95 MG/DL (ref 74–99)
GLUCOSE URINE: NEGATIVE MG/DL
HBA1C MFR BLD: 5.9 % (ref 4–5.6)
HCT VFR BLD CALC: 44.2 % (ref 34–48)
HDLC SERPL-MCNC: 42 MG/DL
HEMOGLOBIN: 14.3 G/DL (ref 11.5–15.5)
IMMATURE GRANULOCYTES: 1 % (ref 0–5)
KETONES, URINE: NEGATIVE MG/DL
LDL CHOLESTEROL: 85 MG/DL
LEUKOCYTE ESTERASE, URINE: ABNORMAL
LYMPHOCYTES ABSOLUTE: 2.37 K/UL (ref 1.5–4)
LYMPHOCYTES RELATIVE PERCENT: 29 % (ref 20–42)
MCH RBC QN AUTO: 29.6 PG (ref 26–35)
MCHC RBC AUTO-ENTMCNC: 32.4 G/DL (ref 32–34.5)
MCV RBC AUTO: 91.5 FL (ref 80–99.9)
MONOCYTES ABSOLUTE: 0.64 K/UL (ref 0.1–0.95)
MONOCYTES RELATIVE PERCENT: 8 % (ref 2–12)
NEUTROPHILS ABSOLUTE: 4.88 K/UL (ref 1.8–7.3)
NEUTROPHILS RELATIVE PERCENT: 59 % (ref 43–80)
NITRITE, URINE: NEGATIVE
PDW BLD-RTO: 12.8 % (ref 11.5–15)
PH UA: 6 (ref 5–9)
PLATELET # BLD: 218 K/UL (ref 130–450)
PMV BLD AUTO: 12.7 FL (ref 7–12)
POTASSIUM SERPL-SCNC: 4.3 MMOL/L (ref 3.5–5)
PROTEIN UA: NEGATIVE MG/DL
RBC # BLD: 4.83 M/UL (ref 3.5–5.5)
RBC UA: ABNORMAL /HPF
RENAL EPITHELIAL, UA: PRESENT /HPF
SODIUM BLD-SCNC: 141 MMOL/L (ref 132–146)
SPECIFIC GRAVITY UA: 1.02 (ref 1–1.03)
T4 TOTAL: 6.6 UG/DL (ref 4.5–11.7)
TOTAL PROTEIN: 6.5 G/DL (ref 6.4–8.3)
TRIGL SERPL-MCNC: 185 MG/DL
TSH SERPL DL<=0.05 MIU/L-ACNC: 2.86 UIU/ML (ref 0.27–4.2)
TURBIDITY: CLEAR
URINE HGB: NEGATIVE
UROBILINOGEN, URINE: 0.2 EU/DL (ref 0–1)
VITAMIN B-12: 357 PG/ML (ref 211–946)
VITAMIN D 25-HYDROXY: 36.2 NG/ML (ref 30–100)
VLDLC SERPL CALC-MCNC: 37 MG/DL
WBC # BLD: 8.3 K/UL (ref 4.5–11.5)
WBC UA: ABNORMAL /HPF

## 2024-02-27 ENCOUNTER — OFFICE VISIT (OUTPATIENT)
Dept: PRIMARY CARE CLINIC | Age: 84
End: 2024-02-27
Payer: MEDICARE

## 2024-02-27 VITALS
OXYGEN SATURATION: 96 % | BODY MASS INDEX: 38.67 KG/M2 | HEIGHT: 61 IN | DIASTOLIC BLOOD PRESSURE: 78 MMHG | SYSTOLIC BLOOD PRESSURE: 128 MMHG | RESPIRATION RATE: 17 BRPM | HEART RATE: 63 BPM | TEMPERATURE: 97.6 F | WEIGHT: 204.8 LBS

## 2024-02-27 DIAGNOSIS — E03.9 ACQUIRED HYPOTHYROIDISM: Chronic | ICD-10-CM

## 2024-02-27 DIAGNOSIS — M51.06 INTERVERTEBRAL LUMBAR DISC DISORDER WITH MYELOPATHY, LUMBAR REGION: Chronic | ICD-10-CM

## 2024-02-27 DIAGNOSIS — I10 ESSENTIAL HYPERTENSION: Chronic | ICD-10-CM

## 2024-02-27 DIAGNOSIS — M81.0 AGE-RELATED OSTEOPOROSIS WITHOUT CURRENT PATHOLOGICAL FRACTURE: ICD-10-CM

## 2024-02-27 DIAGNOSIS — Z00.00 MEDICARE ANNUAL WELLNESS VISIT, SUBSEQUENT: Primary | ICD-10-CM

## 2024-02-27 DIAGNOSIS — M15.9 PRIMARY OSTEOARTHRITIS INVOLVING MULTIPLE JOINTS: Chronic | ICD-10-CM

## 2024-02-27 DIAGNOSIS — R73.03 PRE-DIABETES: Chronic | ICD-10-CM

## 2024-02-27 DIAGNOSIS — E78.2 MIXED HYPERLIPIDEMIA: Chronic | ICD-10-CM

## 2024-02-27 PROCEDURE — G0439 PPPS, SUBSEQ VISIT: HCPCS | Performed by: FAMILY MEDICINE

## 2024-02-27 PROCEDURE — 1123F ACP DISCUSS/DSCN MKR DOCD: CPT | Performed by: FAMILY MEDICINE

## 2024-02-27 PROCEDURE — 3074F SYST BP LT 130 MM HG: CPT | Performed by: FAMILY MEDICINE

## 2024-02-27 PROCEDURE — 3078F DIAST BP <80 MM HG: CPT | Performed by: FAMILY MEDICINE

## 2024-02-27 ASSESSMENT — PATIENT HEALTH QUESTIONNAIRE - PHQ9
SUM OF ALL RESPONSES TO PHQ QUESTIONS 1-9: 0
SUM OF ALL RESPONSES TO PHQ9 QUESTIONS 1 & 2: 0
SUM OF ALL RESPONSES TO PHQ QUESTIONS 1-9: 0
1. LITTLE INTEREST OR PLEASURE IN DOING THINGS: 0
SUM OF ALL RESPONSES TO PHQ QUESTIONS 1-9: 0
SUM OF ALL RESPONSES TO PHQ QUESTIONS 1-9: 0
2. FEELING DOWN, DEPRESSED OR HOPELESS: 0

## 2024-02-27 NOTE — PROGRESS NOTES
Medicare Annual Wellness Visit    Colleen Qiu is here for Follow-up (6 Month follow up ), Medicare AWV, and Discuss Labs (Review labs )    Assessment & Plan   Medicare annual wellness visit, subsequent  Essential hypertension  Mixed hyperlipidemia  Intervertebral lumbar disc disorder with myelopathy, lumbar region  Acquired hypothyroidism  Primary osteoarthritis involving multiple joints  Pre-diabetes    Recommendations for Preventive Services Due: see orders and patient instructions/AVS.  Recommended screening schedule for the next 5-10 years is provided to the patient in written form: see Patient Instructions/AVS.     Return in 6 months (on 8/27/2024) for Lab Before.     Subjective   The following acute and/or chronic problems were also addressed today:    Ck  re   bp chol    back pain  sugar    menaonom        Lab with     [employer]   but stil pre        Patient's complete Health Risk Assessment and screening values have been reviewed and are found in Flowsheets. The following problems were reviewed today and where indicated follow up appointments were made and/or referrals ordered.    Positive Risk Factor Screenings with Interventions:            Controlled Medication Review:      Today's Pain Level: No data recorded   Opioid Risk: (Low risk score <55) Opioid risk score: 11    Patient is low risk for opioid use disorder or overdose.    Last PDMP Yogesh as Reviewed:  Review User Review Instant Review Result                 Activity, Diet, and Weight:  On average, how many days per week do you engage in moderate to strenuous exercise (like a brisk walk)?: 0 days  On average, how many minutes do you engage in exercise at this level?: 0 min    Do you eat balanced/healthy meals regularly?: Yes    Body mass index is 38.7 kg/m². (!) Abnormal      Inactivity Interventions:  Patient declined any further interventions or treatment  Obesity Interventions:  Patient declines any further evaluation or

## 2024-09-03 ENCOUNTER — OFFICE VISIT (OUTPATIENT)
Dept: PRIMARY CARE CLINIC | Age: 84
End: 2024-09-03
Payer: MEDICARE

## 2024-09-03 VITALS
HEIGHT: 61 IN | SYSTOLIC BLOOD PRESSURE: 134 MMHG | HEART RATE: 84 BPM | BODY MASS INDEX: 34.55 KG/M2 | TEMPERATURE: 97.9 F | DIASTOLIC BLOOD PRESSURE: 78 MMHG | OXYGEN SATURATION: 96 % | WEIGHT: 183 LBS

## 2024-09-03 DIAGNOSIS — M54.2 NECK PAIN: ICD-10-CM

## 2024-09-03 DIAGNOSIS — I10 ESSENTIAL HYPERTENSION: Chronic | ICD-10-CM

## 2024-09-03 DIAGNOSIS — E03.9 ACQUIRED HYPOTHYROIDISM: Chronic | ICD-10-CM

## 2024-09-03 DIAGNOSIS — M81.0 AGE-RELATED OSTEOPOROSIS WITHOUT CURRENT PATHOLOGICAL FRACTURE: ICD-10-CM

## 2024-09-03 DIAGNOSIS — I10 ESSENTIAL HYPERTENSION: Primary | Chronic | ICD-10-CM

## 2024-09-03 DIAGNOSIS — R73.03 PRE-DIABETES: Chronic | ICD-10-CM

## 2024-09-03 DIAGNOSIS — E78.2 MIXED HYPERLIPIDEMIA: Chronic | ICD-10-CM

## 2024-09-03 DIAGNOSIS — C43.59 MALIGNANT MELANOMA OF TORSO EXCLUDING BREAST (HCC): ICD-10-CM

## 2024-09-03 LAB
ALBUMIN: 3.7 G/DL (ref 3.5–5.2)
ALP BLD-CCNC: 77 U/L (ref 35–104)
ALT SERPL-CCNC: 17 U/L (ref 0–32)
ANION GAP SERPL CALCULATED.3IONS-SCNC: 16 MMOL/L (ref 7–16)
AST SERPL-CCNC: 23 U/L (ref 0–31)
BASOPHILS ABSOLUTE: 0.06 K/UL (ref 0–0.2)
BASOPHILS RELATIVE PERCENT: 1 % (ref 0–2)
BILIRUB SERPL-MCNC: 0.7 MG/DL (ref 0–1.2)
BUN BLDV-MCNC: 16 MG/DL (ref 6–23)
CALCIUM SERPL-MCNC: 10.1 MG/DL (ref 8.6–10.2)
CHLORIDE BLD-SCNC: 91 MMOL/L (ref 98–107)
CHOLESTEROL, TOTAL: 109 MG/DL
CO2: 25 MMOL/L (ref 22–29)
CREAT SERPL-MCNC: 1.1 MG/DL (ref 0.5–1)
EOSINOPHILS ABSOLUTE: 0.33 K/UL (ref 0.05–0.5)
EOSINOPHILS RELATIVE PERCENT: 4 % (ref 0–6)
GFR, ESTIMATED: 53 ML/MIN/1.73M2
GLUCOSE BLD-MCNC: 87 MG/DL (ref 74–99)
HBA1C MFR BLD: 5.7 % (ref 4–5.6)
HCT VFR BLD CALC: 36.9 % (ref 34–48)
HDLC SERPL-MCNC: 31 MG/DL
HEMOGLOBIN: 12 G/DL (ref 11.5–15.5)
IMMATURE GRANULOCYTES %: 1 % (ref 0–5)
IMMATURE GRANULOCYTES ABSOLUTE: 0.04 K/UL (ref 0–0.58)
LDL CHOLESTEROL: 58 MG/DL
LYMPHOCYTES ABSOLUTE: 1.92 K/UL (ref 1.5–4)
LYMPHOCYTES RELATIVE PERCENT: 25 % (ref 20–42)
MCH RBC QN AUTO: 29.9 PG (ref 26–35)
MCHC RBC AUTO-ENTMCNC: 32.5 G/DL (ref 32–34.5)
MCV RBC AUTO: 92 FL (ref 80–99.9)
MONOCYTES ABSOLUTE: 0.97 K/UL (ref 0.1–0.95)
MONOCYTES RELATIVE PERCENT: 13 % (ref 2–12)
NEUTROPHILS ABSOLUTE: 4.26 K/UL (ref 1.8–7.3)
NEUTROPHILS RELATIVE PERCENT: 56 % (ref 43–80)
PDW BLD-RTO: 12.8 % (ref 11.5–15)
PLATELET # BLD: 348 K/UL (ref 130–450)
PMV BLD AUTO: 11.9 FL (ref 7–12)
POTASSIUM SERPL-SCNC: 4 MMOL/L (ref 3.5–5)
RBC # BLD: 4.01 M/UL (ref 3.5–5.5)
SODIUM BLD-SCNC: 132 MMOL/L (ref 132–146)
TOTAL PROTEIN: 8 G/DL (ref 6.4–8.3)
TRIGL SERPL-MCNC: 101 MG/DL
VITAMIN D 25-HYDROXY: 32.5 NG/ML (ref 30–100)
VLDLC SERPL CALC-MCNC: 20 MG/DL
WBC # BLD: 7.6 K/UL (ref 4.5–11.5)

## 2024-09-03 PROCEDURE — 1123F ACP DISCUSS/DSCN MKR DOCD: CPT | Performed by: FAMILY MEDICINE

## 2024-09-03 PROCEDURE — 3075F SYST BP GE 130 - 139MM HG: CPT | Performed by: FAMILY MEDICINE

## 2024-09-03 PROCEDURE — 99214 OFFICE O/P EST MOD 30 MIN: CPT | Performed by: FAMILY MEDICINE

## 2024-09-03 PROCEDURE — 3078F DIAST BP <80 MM HG: CPT | Performed by: FAMILY MEDICINE

## 2024-09-03 SDOH — ECONOMIC STABILITY: FOOD INSECURITY: WITHIN THE PAST 12 MONTHS, THE FOOD YOU BOUGHT JUST DIDN'T LAST AND YOU DIDN'T HAVE MONEY TO GET MORE.: NEVER TRUE

## 2024-09-03 SDOH — ECONOMIC STABILITY: FOOD INSECURITY: WITHIN THE PAST 12 MONTHS, YOU WORRIED THAT YOUR FOOD WOULD RUN OUT BEFORE YOU GOT MONEY TO BUY MORE.: NEVER TRUE

## 2024-09-03 SDOH — ECONOMIC STABILITY: INCOME INSECURITY: HOW HARD IS IT FOR YOU TO PAY FOR THE VERY BASICS LIKE FOOD, HOUSING, MEDICAL CARE, AND HEATING?: NOT HARD AT ALL

## 2024-09-03 ASSESSMENT — PATIENT HEALTH QUESTIONNAIRE - PHQ9
SUM OF ALL RESPONSES TO PHQ QUESTIONS 1-9: 0
1. LITTLE INTEREST OR PLEASURE IN DOING THINGS: NOT AT ALL
2. FEELING DOWN, DEPRESSED OR HOPELESS: NOT AT ALL
SUM OF ALL RESPONSES TO PHQ QUESTIONS 1-9: 0
SUM OF ALL RESPONSES TO PHQ9 QUESTIONS 1 & 2: 0

## 2024-09-03 ASSESSMENT — ENCOUNTER SYMPTOMS
GASTROINTESTINAL NEGATIVE: 1
ALLERGIC/IMMUNOLOGIC NEGATIVE: 1
EYES NEGATIVE: 1
RESPIRATORY NEGATIVE: 1

## 2024-09-03 NOTE — PROGRESS NOTES
9/3/24  Name: Colleen Qiu    : 1940    Sex: female    Age: 84 y.o.     Ck  re   bp chol   back pain  sugar    melanoma megs    Subjective:  Chief Complaint: Patient is here for aove     Here with son and    in chemo for    malig melanoma---     pt  wants to see  onc in town and   theyent to pt  puneet and want pt here  no cp or sob        Review of Systems   Constitutional: Negative.    HENT: Negative.     Eyes: Negative.    Respiratory: Negative.     Cardiovascular: Negative.    Gastrointestinal: Negative.    Endocrine: Negative.    Genitourinary: Negative.    Musculoskeletal:  Positive for neck pain.   Skin: Negative.    Allergic/Immunologic: Negative.    Neurological: Negative.    Hematological: Negative.    Psychiatric/Behavioral: Negative.           Current Outpatient Medications:     metoprolol succinate (TOPROL XL) 50 MG extended release tablet, Take 1 tablet by mouth in the morning and 1 tablet in the evening., Disp: 180 tablet, Rfl: 5    losartan (COZAAR) 50 MG tablet, Take 1 tablet by mouth daily, Disp: 90 tablet, Rfl: 5    simvastatin (ZOCOR) 20 MG tablet, Take 1 tablet by mouth nightly 1 po qd --hs--cholesterol, Disp: 90 tablet, Rfl: 5    diclofenac (CATAFLAM) 50 MG tablet, Take 1 tablet by mouth 2 times daily, Disp: 90 tablet, Rfl: 3    aMILoride-HCTZ 5-50 MG TABS, Take 1 tablet by mouth daily, Disp: 90 tablet, Rfl: 3    gabapentin (NEURONTIN) 300 MG capsule, Take 1 capsule by mouth in the morning and at bedtime for 360 days., Disp: 180 capsule, Rfl: 3    HYDROcodone-acetaminophen (NORCO) 5-325 MG per tablet, TAKE 1 TABLET BY MOUTH TWICE A DAY AS NEEDED FOR PAIN. START 2022 AND END 2022, Disp: , Rfl:     flurandrenolide (CORDRAN) 0.05 % CREA, Apply topically 2 times daily as needed, Disp: , Rfl:   Allergies   Allergen Reactions    Penicillins      Social History     Socioeconomic History    Marital status: Single     Spouse name: Not on file    Number of children:

## 2024-09-09 DIAGNOSIS — M51.06 INTERVERTEBRAL LUMBAR DISC DISORDER WITH MYELOPATHY, LUMBAR REGION: Chronic | ICD-10-CM

## 2024-09-09 RX ORDER — DICLOFENAC POTASSIUM 50 MG/1
50 TABLET, FILM COATED ORAL 2 TIMES DAILY
Qty: 180 TABLET | Refills: 3 | Status: SHIPPED | OUTPATIENT
Start: 2024-09-09

## 2024-09-29 ENCOUNTER — OFFICE VISIT (OUTPATIENT)
Dept: FAMILY MEDICINE CLINIC | Age: 84
End: 2024-09-29
Payer: MEDICARE

## 2024-09-29 VITALS
OXYGEN SATURATION: 96 % | SYSTOLIC BLOOD PRESSURE: 118 MMHG | HEART RATE: 82 BPM | TEMPERATURE: 97.4 F | DIASTOLIC BLOOD PRESSURE: 70 MMHG

## 2024-09-29 DIAGNOSIS — R07.81 RIB PAIN ON RIGHT SIDE: Primary | ICD-10-CM

## 2024-09-29 PROCEDURE — 3074F SYST BP LT 130 MM HG: CPT | Performed by: NURSE PRACTITIONER

## 2024-09-29 PROCEDURE — 1123F ACP DISCUSS/DSCN MKR DOCD: CPT | Performed by: NURSE PRACTITIONER

## 2024-09-29 PROCEDURE — 99213 OFFICE O/P EST LOW 20 MIN: CPT | Performed by: NURSE PRACTITIONER

## 2024-09-29 PROCEDURE — 3078F DIAST BP <80 MM HG: CPT | Performed by: NURSE PRACTITIONER

## 2024-10-07 ENCOUNTER — OFFICE VISIT (OUTPATIENT)
Dept: PRIMARY CARE CLINIC | Age: 84
End: 2024-10-07
Payer: MEDICARE

## 2024-10-07 VITALS
TEMPERATURE: 98.3 F | OXYGEN SATURATION: 98 % | DIASTOLIC BLOOD PRESSURE: 78 MMHG | BODY MASS INDEX: 32.33 KG/M2 | WEIGHT: 171 LBS | HEART RATE: 60 BPM | SYSTOLIC BLOOD PRESSURE: 128 MMHG

## 2024-10-07 DIAGNOSIS — Z23 NEED FOR INFLUENZA VACCINATION: Primary | ICD-10-CM

## 2024-10-07 DIAGNOSIS — C43.59 MALIGNANT MELANOMA OF TORSO EXCLUDING BREAST (HCC): ICD-10-CM

## 2024-10-07 DIAGNOSIS — E03.9 ACQUIRED HYPOTHYROIDISM: ICD-10-CM

## 2024-10-07 DIAGNOSIS — I10 ESSENTIAL HYPERTENSION: Chronic | ICD-10-CM

## 2024-10-07 DIAGNOSIS — E55.9 VITAMIN D DEFICIENCY: ICD-10-CM

## 2024-10-07 DIAGNOSIS — R73.03 PRE-DIABETES: Chronic | ICD-10-CM

## 2024-10-07 DIAGNOSIS — E53.8 VITAMIN B 12 DEFICIENCY: ICD-10-CM

## 2024-10-07 DIAGNOSIS — M15.0 PRIMARY OSTEOARTHRITIS INVOLVING MULTIPLE JOINTS: Chronic | ICD-10-CM

## 2024-10-07 DIAGNOSIS — E78.2 MIXED HYPERLIPIDEMIA: Chronic | ICD-10-CM

## 2024-10-07 PROCEDURE — 90653 IIV ADJUVANT VACCINE IM: CPT | Performed by: FAMILY MEDICINE

## 2024-10-07 PROCEDURE — 99214 OFFICE O/P EST MOD 30 MIN: CPT | Performed by: FAMILY MEDICINE

## 2024-10-07 PROCEDURE — 3074F SYST BP LT 130 MM HG: CPT | Performed by: FAMILY MEDICINE

## 2024-10-07 PROCEDURE — 1123F ACP DISCUSS/DSCN MKR DOCD: CPT | Performed by: FAMILY MEDICINE

## 2024-10-07 PROCEDURE — 3078F DIAST BP <80 MM HG: CPT | Performed by: FAMILY MEDICINE

## 2024-10-07 PROCEDURE — G0008 ADMIN INFLUENZA VIRUS VAC: HCPCS | Performed by: FAMILY MEDICINE

## 2024-10-07 SDOH — ECONOMIC STABILITY: FOOD INSECURITY: WITHIN THE PAST 12 MONTHS, THE FOOD YOU BOUGHT JUST DIDN'T LAST AND YOU DIDN'T HAVE MONEY TO GET MORE.: NEVER TRUE

## 2024-10-07 SDOH — ECONOMIC STABILITY: INCOME INSECURITY: HOW HARD IS IT FOR YOU TO PAY FOR THE VERY BASICS LIKE FOOD, HOUSING, MEDICAL CARE, AND HEATING?: NOT HARD AT ALL

## 2024-10-07 SDOH — ECONOMIC STABILITY: FOOD INSECURITY: WITHIN THE PAST 12 MONTHS, YOU WORRIED THAT YOUR FOOD WOULD RUN OUT BEFORE YOU GOT MONEY TO BUY MORE.: NEVER TRUE

## 2024-10-07 ASSESSMENT — PATIENT HEALTH QUESTIONNAIRE - PHQ9
2. FEELING DOWN, DEPRESSED OR HOPELESS: NOT AT ALL
SUM OF ALL RESPONSES TO PHQ QUESTIONS 1-9: 0
SUM OF ALL RESPONSES TO PHQ QUESTIONS 1-9: 0
SUM OF ALL RESPONSES TO PHQ9 QUESTIONS 1 & 2: 0
1. LITTLE INTEREST OR PLEASURE IN DOING THINGS: NOT AT ALL
SUM OF ALL RESPONSES TO PHQ QUESTIONS 1-9: 0
SUM OF ALL RESPONSES TO PHQ QUESTIONS 1-9: 0

## 2024-10-07 ASSESSMENT — ENCOUNTER SYMPTOMS
ALLERGIC/IMMUNOLOGIC NEGATIVE: 1
EYES NEGATIVE: 1
GASTROINTESTINAL NEGATIVE: 1
RESPIRATORY NEGATIVE: 1

## 2024-10-07 NOTE — PROGRESS NOTES
Musculoskeletal:         General: Normal range of motion.      Cervical back: Normal range of motion.   Skin:     General: Skin is warm.   Neurological:      Mental Status: She is alert and oriented to person, place, and time.   Psychiatric:         Behavior: Behavior normal.         Colleen was seen today for follow-up.    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  Make sure they were correct and educated  on the risk associated with missing meds or taking them incorrectly.  A list of medications is being sent home with patient today.    Check blood pressure at home twice a day.  Low-salt low caffeine diet.  Call if systolic blood pressure is above 150 and diastolic blood pressures above 85.  Only use a upper arm digital cuff.  A great deal of time spent reviewing medications, diet, exercise, social issues. Also reviewing the chart before entering the room with patient and finishing charting after leaving patient's room. More than half of that time was spent face to face with the patient in counseling and coordinating care.  Aggressive low-fat diet.  Avoid red meats, greasy fried foods, dairy products.  Avoid processed foods.  Take cholesterol medications without food.      I informed patient about the risk associated with noncompliance of medication and taking medications incorrectly.  Appropriate follow-up with myself and all specialist.  Encourage family members to take active role in assisting with medications and medical care.  If any confusion should develop to notify my office immediately to avoid risk of

## 2024-10-16 ENCOUNTER — TELEPHONE (OUTPATIENT)
Dept: PRIMARY CARE CLINIC | Age: 84
End: 2024-10-16

## 2024-10-16 NOTE — TELEPHONE ENCOUNTER
When patient last saw Dr. Wilson for back injections, he mentioned incidental findings on xrays of gallstones.  [facility] CT of abd/pelvis dated 7/3/24 mentions cholelithiasis.  Pt just wants to make sure she keeps ahead of it and is asking for advice.

## 2024-10-17 NOTE — TELEPHONE ENCOUNTER
Notify patient if she should develop abdominal discomfort right upper quadrant of the abdomen especially after eating that could radiate into the back, knees and notify me.  If bad go to ER.  She may never have problems with that.  We usually do not do surgery unless she is having symptoms..  If she is concerned we can refer her to a surgeon for an evaluation

## 2024-10-21 ENCOUNTER — OFFICE VISIT (OUTPATIENT)
Dept: PRIMARY CARE CLINIC | Age: 84
End: 2024-10-21
Payer: MEDICARE

## 2024-10-21 VITALS
SYSTOLIC BLOOD PRESSURE: 132 MMHG | OXYGEN SATURATION: 95 % | DIASTOLIC BLOOD PRESSURE: 78 MMHG | HEART RATE: 80 BPM | WEIGHT: 169 LBS | TEMPERATURE: 98.7 F | BODY MASS INDEX: 31.95 KG/M2

## 2024-10-21 DIAGNOSIS — K80.20 CALCULUS OF GALLBLADDER WITHOUT CHOLECYSTITIS WITHOUT OBSTRUCTION: Primary | ICD-10-CM

## 2024-10-21 DIAGNOSIS — I10 ESSENTIAL HYPERTENSION: Chronic | ICD-10-CM

## 2024-10-21 PROCEDURE — 3075F SYST BP GE 130 - 139MM HG: CPT | Performed by: FAMILY MEDICINE

## 2024-10-21 PROCEDURE — 99213 OFFICE O/P EST LOW 20 MIN: CPT | Performed by: FAMILY MEDICINE

## 2024-10-21 PROCEDURE — 3078F DIAST BP <80 MM HG: CPT | Performed by: FAMILY MEDICINE

## 2024-10-21 PROCEDURE — 1123F ACP DISCUSS/DSCN MKR DOCD: CPT | Performed by: FAMILY MEDICINE

## 2024-10-21 ASSESSMENT — ENCOUNTER SYMPTOMS
GASTROINTESTINAL NEGATIVE: 1
EYES NEGATIVE: 1
RESPIRATORY NEGATIVE: 1
BACK PAIN: 1
ALLERGIC/IMMUNOLOGIC NEGATIVE: 1

## 2024-10-21 NOTE — PROGRESS NOTES
10/21/24  Name: Colleen Qiu    : 1940    Sex: female    Age: 84 y.o.        Subjective:  Chief Complaint: Patient is here for Gb     She had  x ray ct    aug with  sw  back doc and gb sotnes  Back little beter with seeing back doc  no abd pain  n p n  v    But does admit pain ruq    3 week sago        Review of Systems   Constitutional: Negative.    HENT: Negative.     Eyes: Negative.    Respiratory: Negative.     Cardiovascular: Negative.    Gastrointestinal: Negative.    Endocrine: Negative.    Genitourinary: Negative.    Musculoskeletal:  Positive for back pain.   Skin: Negative.    Allergic/Immunologic: Negative.    Neurological: Negative.    Hematological: Negative.    Psychiatric/Behavioral: Negative.           Current Outpatient Medications:     diclofenac (CATAFLAM) 50 MG tablet, Take 1 tablet by mouth 2 times daily, Disp: 180 tablet, Rfl: 3    metoprolol succinate (TOPROL XL) 50 MG extended release tablet, Take 1 tablet by mouth in the morning and 1 tablet in the evening., Disp: 180 tablet, Rfl: 5    losartan (COZAAR) 50 MG tablet, Take 1 tablet by mouth daily, Disp: 90 tablet, Rfl: 5    simvastatin (ZOCOR) 20 MG tablet, Take 1 tablet by mouth nightly 1 po qd --hs--cholesterol, Disp: 90 tablet, Rfl: 5    aMILoride-HCTZ 5-50 MG TABS, Take 1 tablet by mouth daily, Disp: 90 tablet, Rfl: 3    gabapentin (NEURONTIN) 300 MG capsule, Take 1 capsule by mouth in the morning and at bedtime for 360 days., Disp: 180 capsule, Rfl: 3    HYDROcodone-acetaminophen (NORCO) 5-325 MG per tablet, TAKE 1 TABLET BY MOUTH TWICE A DAY AS NEEDED FOR PAIN. START 2022 AND END 2022, Disp: , Rfl:   Allergies   Allergen Reactions    Penicillins      Social History     Socioeconomic History    Marital status: Single     Spouse name: Not on file    Number of children: Not on file    Years of education: Not on file    Highest education level: Not on file   Occupational History    Not on file   Tobacco Use

## 2024-11-11 DIAGNOSIS — M51.06 INTERVERTEBRAL LUMBAR DISC DISORDER WITH MYELOPATHY, LUMBAR REGION: Chronic | ICD-10-CM

## 2024-11-11 DIAGNOSIS — I10 ESSENTIAL HYPERTENSION: Chronic | ICD-10-CM

## 2024-11-11 RX ORDER — AMILORIDE HYDROCHLORIDE AND HYDROCHLOROTHIAZIDE 5; 50 MG/1; MG/1
1 TABLET ORAL DAILY
Qty: 90 TABLET | Refills: 3 | Status: SHIPPED | OUTPATIENT
Start: 2024-11-11

## 2024-11-11 RX ORDER — GABAPENTIN 300 MG/1
300 CAPSULE ORAL 2 TIMES DAILY
Qty: 180 CAPSULE | Refills: 3 | Status: SHIPPED | OUTPATIENT
Start: 2024-11-11 | End: 2025-11-06

## 2024-11-26 ENCOUNTER — OFFICE VISIT (OUTPATIENT)
Dept: SURGERY | Age: 84
End: 2024-11-26
Payer: MEDICARE

## 2024-11-26 VITALS
TEMPERATURE: 97.6 F | WEIGHT: 161.8 LBS | DIASTOLIC BLOOD PRESSURE: 62 MMHG | HEIGHT: 61 IN | HEART RATE: 67 BPM | RESPIRATION RATE: 18 BRPM | OXYGEN SATURATION: 97 % | BODY MASS INDEX: 30.55 KG/M2 | SYSTOLIC BLOOD PRESSURE: 115 MMHG

## 2024-11-26 DIAGNOSIS — K80.20 SYMPTOMATIC CHOLELITHIASIS: ICD-10-CM

## 2024-11-26 DIAGNOSIS — K80.50 CHOLEDOCHOLITHIASIS: ICD-10-CM

## 2024-11-26 DIAGNOSIS — R19.5 CLAY-COLORED STOOLS: Primary | ICD-10-CM

## 2024-11-26 PROCEDURE — 99204 OFFICE O/P NEW MOD 45 MIN: CPT | Performed by: SURGERY

## 2024-11-26 PROCEDURE — 1159F MED LIST DOCD IN RCRD: CPT | Performed by: SURGERY

## 2024-11-26 PROCEDURE — 3078F DIAST BP <80 MM HG: CPT | Performed by: SURGERY

## 2024-11-26 PROCEDURE — 3074F SYST BP LT 130 MM HG: CPT | Performed by: SURGERY

## 2024-11-26 PROCEDURE — 1123F ACP DISCUSS/DSCN MKR DOCD: CPT | Performed by: SURGERY

## 2024-11-27 ENCOUNTER — TELEPHONE (OUTPATIENT)
Dept: SURGERY | Age: 84
End: 2024-11-27

## 2024-11-27 DIAGNOSIS — R19.5 CLAY-COLORED STOOLS: ICD-10-CM

## 2024-11-27 DIAGNOSIS — K80.50 CHOLEDOCHOLITHIASIS: ICD-10-CM

## 2024-11-27 NOTE — PROGRESS NOTES
[facility] Surgery Clinic Note    Assessment & Plan  1. Gallstones.  The presence of gallstones was confirmed through an ultrasound. She reported experiencing right-sided pain and light-colored stools about a month ago, which have since resolved. These symptoms suggest she may have passed a gallstone. Blood work will be ordered to assess her gallbladder function. An MRI will be scheduled to ensure there are no remaining stones in the main duct and to confirm the absence of pancreatic issues. If the MRI results are satisfactory, a laparoscopic cholecystectomy will be planned. The procedure involves making small incisions in the abdomen to remove the gallbladder. The patient was informed that the recovery period is typically 7 to 10 days. Surgery will be scheduled after the holidays.    2. Shmuel Colored Stools  Blood work will be ordered to assess her liver function. An MRI will be scheduled to ensure there are no remaining stones in the main duct and to confirm the absence of pancreatic issues.     Return for Results.    Colleen was seen today for new patient.    Diagnoses and all orders for this visit:    Shmuel-colored stools  -     CBC with Auto Differential; Future  -     Hepatic Function Panel; Future  -     Basic Metabolic Panel; Future  -     MRI ABDOMEN W WO CONTRAST MRCP; Future    Choledocholithiasis  -     MRI ABDOMEN W WO CONTRAST MRCP; Future    Symptomatic cholelithiasis           Chief Complaint   Patient presents with    New Patient     NEW PATIENT - new- Calculus of gallbladder without cholecystitis without obstruction- US 11/12 (ref by Haritha)         PCP: Edmond Mg DO  CC: Edmond Mg DO     Colleen Qiu is a 84 y.o. female.    History of Present Illness  The patient presents for evaluation of gallstones.    She was referred by Dr. Mg for an examination of her gallbladder. Gallstones were discovered on an x-ray during her routine back injections with

## 2024-11-27 NOTE — TELEPHONE ENCOUNTER
Spoke to patient.  Faxed order and records to [facility], they will call patient to schedule.  Also patient notified about labs, can go to any [facility] labs, no appt needed, orders in system.  Asked patient to call office to schedule follow up after she is scheduled.

## 2024-12-03 ENCOUNTER — TELEPHONE (OUTPATIENT)
Dept: SURGERY | Age: 84
End: 2024-12-03

## 2024-12-03 ENCOUNTER — PREP FOR PROCEDURE (OUTPATIENT)
Dept: SURGERY | Age: 84
End: 2024-12-03

## 2024-12-03 DIAGNOSIS — K80.20 SYMPTOMATIC CHOLELITHIASIS: ICD-10-CM

## 2024-12-03 NOTE — TELEPHONE ENCOUNTER
Colleen Qiu is scheduled for laparoscopic robotic XI assisted cholecystectomy with Dr Chambers on 01-27-25 at [facility]. Patient needs to be NPO after midnight the night before procedure. All surgery instructions were explained to the patient and a surgery letter was also mailed out. MA informed patient that PAT will also be calling to review pre-op instructions and medications. Patient verbalized understanding.  Electronically signed by Mitlai Julian MA on 12/3/2024 at 6:35 AM

## 2024-12-04 DIAGNOSIS — R19.5 CLAY-COLORED STOOLS: ICD-10-CM

## 2024-12-04 LAB
ALBUMIN: 3.4 G/DL (ref 3.5–5.2)
ALP BLD-CCNC: 79 U/L (ref 35–104)
ALT SERPL-CCNC: <5 U/L (ref 0–32)
ANION GAP SERPL CALCULATED.3IONS-SCNC: 11 MMOL/L (ref 7–16)
AST SERPL-CCNC: 14 U/L (ref 0–31)
BASOPHILS ABSOLUTE: 0.04 K/UL (ref 0–0.2)
BASOPHILS RELATIVE PERCENT: 1 % (ref 0–2)
BILIRUB SERPL-MCNC: 0.7 MG/DL (ref 0–1.2)
BILIRUBIN DIRECT: <0.2 MG/DL (ref 0–0.3)
BILIRUBIN, INDIRECT: ABNORMAL MG/DL (ref 0–1)
BUN BLDV-MCNC: 17 MG/DL (ref 6–23)
CALCIUM SERPL-MCNC: 10.1 MG/DL (ref 8.6–10.2)
CHLORIDE BLD-SCNC: 95 MMOL/L (ref 98–107)
CO2: 27 MMOL/L (ref 22–29)
CREAT SERPL-MCNC: 1 MG/DL (ref 0.5–1)
EOSINOPHILS ABSOLUTE: 0.48 K/UL (ref 0.05–0.5)
EOSINOPHILS RELATIVE PERCENT: 6 % (ref 0–6)
GFR, ESTIMATED: 54 ML/MIN/1.73M2
GLUCOSE BLD-MCNC: 90 MG/DL (ref 74–99)
HCT VFR BLD CALC: 34.8 % (ref 34–48)
HEMOGLOBIN: 10.7 G/DL (ref 11.5–15.5)
IMMATURE GRANULOCYTES %: 1 % (ref 0–5)
IMMATURE GRANULOCYTES ABSOLUTE: 0.06 K/UL (ref 0–0.58)
LYMPHOCYTES ABSOLUTE: 1.41 K/UL (ref 1.5–4)
LYMPHOCYTES RELATIVE PERCENT: 18 % (ref 20–42)
MCH RBC QN AUTO: 29 PG (ref 26–35)
MCHC RBC AUTO-ENTMCNC: 30.7 G/DL (ref 32–34.5)
MCV RBC AUTO: 94.3 FL (ref 80–99.9)
MONOCYTES ABSOLUTE: 0.54 K/UL (ref 0.1–0.95)
MONOCYTES RELATIVE PERCENT: 7 % (ref 2–12)
NEUTROPHILS ABSOLUTE: 5.19 K/UL (ref 1.8–7.3)
NEUTROPHILS RELATIVE PERCENT: 67 % (ref 43–80)
PDW BLD-RTO: 13.2 % (ref 11.5–15)
PLATELET # BLD: 404 K/UL (ref 130–450)
PMV BLD AUTO: 12.8 FL (ref 7–12)
POTASSIUM SERPL-SCNC: 4.1 MMOL/L (ref 3.5–5)
RBC # BLD: 3.69 M/UL (ref 3.5–5.5)
SODIUM BLD-SCNC: 133 MMOL/L (ref 132–146)
TOTAL PROTEIN: 7.2 G/DL (ref 6.4–8.3)
WBC # BLD: 7.7 K/UL (ref 4.5–11.5)

## 2024-12-19 ENCOUNTER — TELEPHONE (OUTPATIENT)
Dept: SURGERY | Age: 84
End: 2024-12-19

## 2024-12-19 ENCOUNTER — PREP FOR PROCEDURE (OUTPATIENT)
Dept: GASTROENTEROLOGY | Age: 84
End: 2024-12-19

## 2024-12-19 ENCOUNTER — TELEPHONE (OUTPATIENT)
Dept: GASTROENTEROLOGY | Age: 84
End: 2024-12-19

## 2024-12-19 DIAGNOSIS — K80.50 CHOLEDOCHOLITHIASIS: ICD-10-CM

## 2024-12-19 DIAGNOSIS — R19.5 CLAY-COLORED STOOLS: ICD-10-CM

## 2024-12-19 DIAGNOSIS — K80.50 CHOLEDOCHOLITHIASIS: Primary | ICD-10-CM

## 2024-12-19 NOTE — TELEPHONE ENCOUNTER
Spoke to patient regarding her upcoming ERCP for 1-3-25 at [facility].  To arrive at 7:45 for an 8:45 procedure.  Electronically signed by YADI SORIANO LPN on 12/19/2024 at 1:23 PM

## 2024-12-19 NOTE — TELEPHONE ENCOUNTER
Prior Authorization Form:      DEMOGRAPHICS:                     Patient Name:  Colleen Qiu  Patient :  1940            Insurance:  Payor: [facility] MEDICARE / Plan: [facility] MEDICARE ADVANTAGE / Product Type: *No Product type* /   Insurance ID Number:    Payer/Plan Subscr  Sex Relation Sub. Ins. ID Effective Group Num   1. [facility] MEDICARE * DEBRA QIU* 1940 Female Self [id number]                                    [address] [id number]         DIAGNOSIS & PROCEDURE:                       Procedure/Operation: ERCP           CPT Code: 66165    Diagnosis:  CHOLEDOCHOLITHIASIS    ICD10 Code: K80.50    Location:  [facility]    Surgeon:  LEONARDO    SCHEDULING INFORMATION:                          Date: 1--3-25    Time: 845              Anesthesia:  MAC/TIVA                                                       Status:  Outpatient        Special Comments:  NA       Electronically signed by YADI SORIANO LPN on 2024 at 1:15 PM

## 2024-12-19 NOTE — TELEPHONE ENCOUNTER
Spoke to patient regarding MRCP results.  Shows stone in common bile duct.  Will need urgent ERCP with Dr Emmanuel before gallbladder surgery, patient can come in next week to discuss.  Also spoke to Cassidy at Dr Holliday office and let them know that MRCP showed unchanged 1cm hepatic lesion per report and PET scan was negative. Patient declined appt next week, will be out of town.  Messaged Dr Emmanuel's office of urgent referral.

## 2024-12-23 ENCOUNTER — TELEPHONE (OUTPATIENT)
Age: 84
End: 2024-12-23

## 2024-12-23 NOTE — TELEPHONE ENCOUNTER
Pt called the office having some questions about her Stent in on January 3rd. Please advice. Electronically signed by Meka Bedoya MA on 12/23/24 at 1:31 PM EST

## 2024-12-27 NOTE — PROGRESS NOTES
[facility] PRE-ADMISSION TESTING   ENDOSCOPY INSTRUCTIONS  PAT- Phone Number: [phone]    ENDOSCOPY INSTRUCTIONS:     [x] Antibacterial Soap Shower Night before AND morning of procedure.  [x] Do not wear makeup, lotions, powders, deodorant.   [x] No solid food after midnight. You may have SIPS of clear liquids up until 2 hours before your arrival time to the hospital.   [x] You may brush your teeth, gargle, but do NOT swallow water.   [x] No tobacco products, illegal drugs, or alcohol within 24 hours of your surgery.  [x] Jewelry or valuables should not be brought to the hospital. All body and/or tongue piercing's must be removed prior to arriving to hospital. No contact lens or hair pins.   [x] Arrange transportation with a responsible adult  to and from the hospital. If you do not have a responsible adult  to transport you, you will need to make arrangements with a medical transportation company. Arrange for someone to be with you for the remainder of the day and for 24 hours after your procedure due to having had anesthesia.    -Who will be your  for transportation? son  -Who will be staying with you for 24 hrs after your procedure? son  [x] Bring insurance card and photo ID.    PARKING INSTRUCTIONS:     [x] ARRIVAL DATE & TIME: 1/3 at 0800  [x] Times are subject to change. We will contact you the business day before surgery if that were to occur.  [x] Enter into the [facility] Entrance. Two people may accompany you. Masks are not required.  [x] Parking Lot \"I\" is where you will park. It is located on the corner of [address]. The entrance is on [address].   Only one vehicle - per patient, is permitted in parking lot.   Upon entering the parking lot, a voucher ticket will print.    MEDICATION INSTRUCTIONS:    [x] Bring a complete list of your medications, please write the last time you took the medicine, give this list to  the nurse in Pre-Op.  [x] Take ONLY the following medications the morning of surgery: gabapentin,metoprolol  [x] Stop all herbal supplements and vitamins 5 days before surgery.   [x] Stop all NSAIDS 7 days before surgery.  [x] DO NOT take any diabetic medicine the morning of surgery.  Follow instructions for insulin the day before surgery.  [x] If you are diabetic and your blood sugar is low or you feel symptomatic, you may drink 1-2 ounces of apple juice or take a glucose tablet.            -The morning of your procedure, you may call the pre-op area if you have concerns about your blood sugar [phone].  [x] Follow physician instructions regarding any blood thinners you may be taking.    WHAT TO EXPECT:    [x] The day of your procedure you will be greeted and checked in by the retickr .  In addition, you will be registered in the [facility] by a Patient Access Representative. Please bring your photo ID and insurance card. A nurse will greet you in accordance to the time you are needed in the pre-op area to prepare you for surgery. Please do not be discouraged if you are not greeted in the order you arrive as there are many variables that are involved in patient preparation. Your patience is greatly appreciated as you wait for your nurse.  [x] Delays may occur. Staff will make a sincere effort to keep you informed of delays. If any delays occur with your procedure, we apologize ahead of time for your inconvenience as we recognize the value of your time.

## 2025-01-03 ENCOUNTER — ANESTHESIA (OUTPATIENT)
Dept: ENDOSCOPY | Age: 85
End: 2025-01-03
Payer: MEDICARE

## 2025-01-03 ENCOUNTER — ANESTHESIA EVENT (OUTPATIENT)
Dept: ENDOSCOPY | Age: 85
End: 2025-01-03
Payer: MEDICARE

## 2025-01-03 ENCOUNTER — HOSPITAL ENCOUNTER (OUTPATIENT)
Age: 85
Setting detail: OUTPATIENT SURGERY
Discharge: HOME | End: 2025-01-03
Attending: STUDENT IN AN ORGANIZED HEALTH CARE EDUCATION/TRAINING PROGRAM | Admitting: STUDENT IN AN ORGANIZED HEALTH CARE EDUCATION/TRAINING PROGRAM
Payer: MEDICARE

## 2025-01-03 ENCOUNTER — APPOINTMENT (OUTPATIENT)
Dept: GENERAL RADIOLOGY | Age: 85
End: 2025-01-03
Attending: STUDENT IN AN ORGANIZED HEALTH CARE EDUCATION/TRAINING PROGRAM
Payer: MEDICARE

## 2025-01-03 VITALS
TEMPERATURE: 98 F | WEIGHT: 152 LBS | HEIGHT: 61 IN | RESPIRATION RATE: 18 BRPM | OXYGEN SATURATION: 97 % | BODY MASS INDEX: 28.7 KG/M2 | HEART RATE: 72 BPM | SYSTOLIC BLOOD PRESSURE: 149 MMHG | DIASTOLIC BLOOD PRESSURE: 72 MMHG

## 2025-01-03 DIAGNOSIS — Z01.812 PRE-OPERATIVE LABORATORY EXAMINATION: Primary | ICD-10-CM

## 2025-01-03 LAB — GLUCOSE BLD-MCNC: 109 MG/DL (ref 74–99)

## 2025-01-03 PROCEDURE — 43264 ERCP REMOVE DUCT CALCULI: CPT | Performed by: STUDENT IN AN ORGANIZED HEALTH CARE EDUCATION/TRAINING PROGRAM

## 2025-01-03 PROCEDURE — 6360000002 HC RX W HCPCS

## 2025-01-03 PROCEDURE — 7100000010 HC PHASE II RECOVERY - FIRST 15 MIN: Performed by: STUDENT IN AN ORGANIZED HEALTH CARE EDUCATION/TRAINING PROGRAM

## 2025-01-03 PROCEDURE — 2709999900 HC NON-CHARGEABLE SUPPLY: Performed by: STUDENT IN AN ORGANIZED HEALTH CARE EDUCATION/TRAINING PROGRAM

## 2025-01-03 PROCEDURE — 3609015100 HC ERCP STENT PLACEMENT BILIARY/PANCREATIC DUCT: Performed by: STUDENT IN AN ORGANIZED HEALTH CARE EDUCATION/TRAINING PROGRAM

## 2025-01-03 PROCEDURE — 2580000003 HC RX 258: Performed by: STUDENT IN AN ORGANIZED HEALTH CARE EDUCATION/TRAINING PROGRAM

## 2025-01-03 PROCEDURE — 74330 X-RAY BILE/PANC ENDOSCOPY: CPT

## 2025-01-03 PROCEDURE — 43274 ERCP DUCT STENT PLACEMENT: CPT | Performed by: STUDENT IN AN ORGANIZED HEALTH CARE EDUCATION/TRAINING PROGRAM

## 2025-01-03 PROCEDURE — C2625 STENT, NON-COR, TEM W/DEL SY: HCPCS | Performed by: STUDENT IN AN ORGANIZED HEALTH CARE EDUCATION/TRAINING PROGRAM

## 2025-01-03 PROCEDURE — 6370000000 HC RX 637 (ALT 250 FOR IP): Performed by: STUDENT IN AN ORGANIZED HEALTH CARE EDUCATION/TRAINING PROGRAM

## 2025-01-03 PROCEDURE — 3700000001 HC ADD 15 MINUTES (ANESTHESIA): Performed by: STUDENT IN AN ORGANIZED HEALTH CARE EDUCATION/TRAINING PROGRAM

## 2025-01-03 PROCEDURE — 7100000011 HC PHASE II RECOVERY - ADDTL 15 MIN: Performed by: STUDENT IN AN ORGANIZED HEALTH CARE EDUCATION/TRAINING PROGRAM

## 2025-01-03 PROCEDURE — 6360000004 HC RX CONTRAST MEDICATION: Performed by: STUDENT IN AN ORGANIZED HEALTH CARE EDUCATION/TRAINING PROGRAM

## 2025-01-03 PROCEDURE — 82962 GLUCOSE BLOOD TEST: CPT

## 2025-01-03 PROCEDURE — 3700000000 HC ANESTHESIA ATTENDED CARE: Performed by: STUDENT IN AN ORGANIZED HEALTH CARE EDUCATION/TRAINING PROGRAM

## 2025-01-03 PROCEDURE — 74328 X-RAY BILE DUCT ENDOSCOPY: CPT | Performed by: STUDENT IN AN ORGANIZED HEALTH CARE EDUCATION/TRAINING PROGRAM

## 2025-01-03 PROCEDURE — 3609015200 HC ERCP REMOVE CALCULI/DEBRIS BILIARY/PANCREAS DUCT: Performed by: STUDENT IN AN ORGANIZED HEALTH CARE EDUCATION/TRAINING PROGRAM

## 2025-01-03 PROCEDURE — 2720000010 HC SURG SUPPLY STERILE: Performed by: STUDENT IN AN ORGANIZED HEALTH CARE EDUCATION/TRAINING PROGRAM

## 2025-01-03 DEVICE — BILIARY STENT WITH RX DELIVERY SYSTEM
Type: IMPLANTABLE DEVICE | Status: FUNCTIONAL
Brand: RX BILIARY

## 2025-01-03 RX ORDER — INDOMETHACIN 100 MG
SUPPOSITORY, RECTAL RECTAL PRN
Status: DISCONTINUED | OUTPATIENT
Start: 2025-01-03 | End: 2025-01-03 | Stop reason: ALTCHOICE

## 2025-01-03 RX ORDER — PROPOFOL 10 MG/ML
INJECTION, EMULSION INTRAVENOUS
Status: DISCONTINUED | OUTPATIENT
Start: 2025-01-03 | End: 2025-01-03 | Stop reason: SDUPTHER

## 2025-01-03 RX ORDER — SODIUM CHLORIDE, SODIUM LACTATE, POTASSIUM CHLORIDE, CALCIUM CHLORIDE 600; 310; 30; 20 MG/100ML; MG/100ML; MG/100ML; MG/100ML
INJECTION, SOLUTION INTRAVENOUS CONTINUOUS
Status: DISCONTINUED | OUTPATIENT
Start: 2025-01-03 | End: 2025-01-03

## 2025-01-03 RX ORDER — SODIUM CHLORIDE 0.9 % (FLUSH) 0.9 %
5-40 SYRINGE (ML) INJECTION EVERY 12 HOURS SCHEDULED
Status: DISCONTINUED | OUTPATIENT
Start: 2025-01-03 | End: 2025-01-03 | Stop reason: HOSPADM

## 2025-01-03 RX ORDER — IOPAMIDOL 612 MG/ML
INJECTION, SOLUTION INTRAVASCULAR PRN
Status: DISCONTINUED | OUTPATIENT
Start: 2025-01-03 | End: 2025-01-03 | Stop reason: ALTCHOICE

## 2025-01-03 RX ORDER — ONDANSETRON 4 MG/1
4 TABLET, ORALLY DISINTEGRATING ORAL EVERY 8 HOURS PRN
Status: DISCONTINUED | OUTPATIENT
Start: 2025-01-03 | End: 2025-01-03 | Stop reason: HOSPADM

## 2025-01-03 RX ORDER — ONDANSETRON 2 MG/ML
4 INJECTION INTRAMUSCULAR; INTRAVENOUS EVERY 6 HOURS PRN
Status: DISCONTINUED | OUTPATIENT
Start: 2025-01-03 | End: 2025-01-03 | Stop reason: HOSPADM

## 2025-01-03 RX ORDER — SODIUM CHLORIDE 0.9 % (FLUSH) 0.9 %
5-40 SYRINGE (ML) INJECTION PRN
Status: DISCONTINUED | OUTPATIENT
Start: 2025-01-03 | End: 2025-01-03 | Stop reason: HOSPADM

## 2025-01-03 RX ORDER — SODIUM CHLORIDE 9 MG/ML
INJECTION, SOLUTION INTRAVENOUS PRN
Status: DISCONTINUED | OUTPATIENT
Start: 2025-01-03 | End: 2025-01-03 | Stop reason: HOSPADM

## 2025-01-03 RX ORDER — INDOMETHACIN 100 MG
100 SUPPOSITORY, RECTAL RECTAL
Status: DISCONTINUED | OUTPATIENT
Start: 2025-01-03 | End: 2025-01-03 | Stop reason: HOSPADM

## 2025-01-03 RX ADMIN — PROPOFOL 50 MG: 10 INJECTION, EMULSION INTRAVENOUS at 10:22

## 2025-01-03 RX ADMIN — SODIUM CHLORIDE, POTASSIUM CHLORIDE, SODIUM LACTATE AND CALCIUM CHLORIDE: 600; 310; 30; 20 INJECTION, SOLUTION INTRAVENOUS at 09:45

## 2025-01-03 RX ADMIN — PROPOFOL 30 MG: 10 INJECTION, EMULSION INTRAVENOUS at 10:01

## 2025-01-03 RX ADMIN — PROPOFOL 40 MG: 10 INJECTION, EMULSION INTRAVENOUS at 10:06

## 2025-01-03 RX ADMIN — PROPOFOL 40 MG: 10 INJECTION, EMULSION INTRAVENOUS at 10:09

## 2025-01-03 RX ADMIN — PROPOFOL 30 MG: 10 INJECTION, EMULSION INTRAVENOUS at 09:53

## 2025-01-03 RX ADMIN — PROPOFOL 40 MG: 10 INJECTION, EMULSION INTRAVENOUS at 10:16

## 2025-01-03 RX ADMIN — PROPOFOL 20 MG: 10 INJECTION, EMULSION INTRAVENOUS at 09:57

## 2025-01-03 RX ADMIN — PROPOFOL 110 MCG/KG/MIN: 10 INJECTION, EMULSION INTRAVENOUS at 09:53

## 2025-01-03 ASSESSMENT — LIFESTYLE VARIABLES: SMOKING_STATUS: 0

## 2025-01-03 ASSESSMENT — PAIN - FUNCTIONAL ASSESSMENT: PAIN_FUNCTIONAL_ASSESSMENT: 0-10

## 2025-01-03 NOTE — ANESTHESIA PRE PROCEDURE
Department of Anesthesiology  Preprocedure Note       Name:  Colleen Qiu   Age:  84 y.o.  :  1940                                          MRN:  [id number]         Date:  1/3/2025      Surgeon: Surgeon(s):  Herson Emmanuel MD    Procedure: Procedure(s):  ERCP ENDOSCOPIC RETROGRADE CHOLANGIOPANCREATOGRAPHY    Medications prior to admission:   Prior to Admission medications    Medication Sig Start Date End Date Taking? Authorizing Provider   aMILoride-HCTZ 5-50 MG TABS Take 1 tablet by mouth daily 24   Edmond Mg DO   gabapentin (NEURONTIN) 300 MG capsule Take 1 capsule by mouth in the morning and at bedtime for 360 days. 24  Edmond Mg DO   diclofenac (CATAFLAM) 50 MG tablet Take 1 tablet by mouth 2 times daily 24   Edmond Mg DO   metoprolol succinate (TOPROL XL) 50 MG extended release tablet Take 1 tablet by mouth in the morning and 1 tablet in the evening. 23   Edmond Mg DO   losartan (COZAAR) 50 MG tablet Take 1 tablet by mouth daily  Patient taking differently: Take 1 tablet by mouth at bedtime 23   Edmond Mg DO   simvastatin (ZOCOR) 20 MG tablet Take 1 tablet by mouth nightly 1 po qd --hs--cholesterol 23   Edmond Mg DO   HYDROcodone-acetaminophen (NORCO) 5-325 MG per tablet TAKE 1 TABLET BY MOUTH TWICE A DAY AS NEEDED FOR PAIN. START 2022 AND END 2022   ProviderJeremie MD       Current medications:    Current Facility-Administered Medications   Medication Dose Route Frequency Provider Last Rate Last Admin    lactated ringers infusion   IntraVENous Continuous Herson Emmanuel MD        indomethacin (INDOCIN) 100 MG suppository 100 mg  100 mg Rectal On Call to OR Herson Emmanuel MD           Allergies:    Allergies   Allergen Reactions    Penicillins        Problem List:    Patient Active Problem List   Diagnosis Code    Intervertebral lumbar disc disorder with myelopathy, lumbar

## 2025-01-03 NOTE — ANESTHESIA POSTPROCEDURE EVALUATION
Department of Anesthesiology  Postprocedure Note    Patient: Colleen Qiu  MRN: [id number]  YOB: 1940  Date of evaluation: 1/3/2025    Procedure Summary       Date: 01/03/25 Room / Location: [place] [address] / [facility]    Anesthesia Start: 0945 Anesthesia Stop: 1044    Procedures:       ENDOSCOPIC RETROGRADE CHOLANGIOPANCREATOGRAPHY STONE REMOVAL      ENDOSCOPIC RETROGRADE CHOLANGIOPANCREATOGRAPHY STENT INSERTION Diagnosis:       Choledocholithiasis      (Choledocholithiasis [K80.50])    Surgeons: Herson Emmanuel MD Responsible Provider: Summer Lombardo MD    Anesthesia Type: MAC ASA Status: 3            Anesthesia Type: No value filed.    Suzi Phase I: Suzi Score: 10    Suzi Phase II: Suzi Score: 8    Anesthesia Post Evaluation    Patient location during evaluation: PACU  Patient participation: complete - patient participated  Level of consciousness: awake  Airway patency: patent  Nausea & Vomiting: no nausea and no vomiting  Cardiovascular status: hemodynamically stable  Respiratory status: acceptable  Hydration status: euvolemic  Pain management: adequate    No notable events documented.

## 2025-01-03 NOTE — H&P
[facility]   Gastroenterology, Hepatology, &  Advanced Endoscopy    Consult       ASSESSMENT AND PLAN:    84y/F w/ history of symptomatic cholelithiasis with pre-operative MRCP showing choledocholithiasis who presents for ERCP.    PLAN:  ERCP        HISTORY OF PRESENT ILLNESS:      Ms. Ravi Qiu is an 84y/F w/ history of symptomatic cholelithiasis with pre-operative MRCP showing choledocholithiasis who presents for ERCP.    Past Medical History:        Diagnosis Date    Atypical mole 3/16/2021    Chronic obstructive lung disease (HCC)     Diet-controlled diabetes mellitus (HCC) 9/12/2019    DJD (degenerative joint disease)     multiple joints    Heart disease     benign hypertensive heart disease without congestive heart failure    Hyperlipidemia     Hypertension     Hypothyroidism     Type 2 diabetes mellitus without complication (HCC)      Past Surgical History:        Procedure Laterality Date    ANKLE SURGERY Right     SHOULDER SURGERY Left      Social History:    TOBACCO:   reports that she quit smoking about 13 years ago. Her smoking use included cigarettes. She started smoking about 38 years ago. She has never used smokeless tobacco.  ETOH:   reports no history of alcohol use.  DRUGS:   reports no history of drug use.  Family History:   History reviewed. No pertinent family history.    No current facility-administered medications for this encounter.    Current Outpatient Medications:     aMILoride-HCTZ 5-50 MG TABS, Take 1 tablet by mouth daily, Disp: 90 tablet, Rfl: 3    gabapentin (NEURONTIN) 300 MG capsule, Take 1 capsule by mouth in the morning and at bedtime for 360 days., Disp: 180 capsule, Rfl: 3    diclofenac (CATAFLAM) 50 MG tablet, Take 1 tablet by mouth 2 times daily, Disp: 180 tablet, Rfl: 3    metoprolol succinate (TOPROL XL) 50 MG extended release tablet, Take 1 tablet by mouth in the morning and 1 tablet in the evening., Disp: 180 tablet, Rfl: 5    losartan (COZAAR) 50 MG

## 2025-01-06 ENCOUNTER — TELEPHONE (OUTPATIENT)
Age: 85
End: 2025-01-06

## 2025-01-06 ENCOUNTER — PREP FOR PROCEDURE (OUTPATIENT)
Age: 85
End: 2025-01-06

## 2025-01-06 DIAGNOSIS — K80.50 CHOLEDOCHOLITHIASIS: ICD-10-CM

## 2025-01-06 DIAGNOSIS — K80.50 CHOLEDOCHOLITHIASIS: Primary | ICD-10-CM

## 2025-01-06 NOTE — TELEPHONE ENCOUNTER
Patient was called today to inform her of her procedure scheduled on March 10th, at [facility]. Also patient was told to have a KUB Xray done before her follow up appt with Ana Laura on Feb 18th. Patient was given all details of her appointments with time. Patient will also be sent all this information via mailing address on file confirmed by patient over the phone, patient verbalized understanding. Electronically signed by Meka Bedoya MA on 1/6/25 at 11:51 AM EST

## 2025-01-10 ENCOUNTER — HOSPITAL ENCOUNTER (INPATIENT)
Age: 85
LOS: 8 days | Discharge: HOME | DRG: 853 | End: 2025-01-18
Attending: EMERGENCY MEDICINE | Admitting: INTERNAL MEDICINE
Payer: MEDICARE

## 2025-01-10 ENCOUNTER — APPOINTMENT (OUTPATIENT)
Dept: ULTRASOUND IMAGING | Age: 85
DRG: 853 | End: 2025-01-10
Payer: MEDICARE

## 2025-01-10 DIAGNOSIS — K80.20 SYMPTOMATIC CHOLELITHIASIS: ICD-10-CM

## 2025-01-10 DIAGNOSIS — N17.9 AKI (ACUTE KIDNEY INJURY) (HCC): ICD-10-CM

## 2025-01-10 DIAGNOSIS — R10.11 ABDOMINAL PAIN, RIGHT UPPER QUADRANT: ICD-10-CM

## 2025-01-10 DIAGNOSIS — E87.8 HYPOCHLOREMIA: ICD-10-CM

## 2025-01-10 DIAGNOSIS — E87.1 HYPONATREMIA: Primary | ICD-10-CM

## 2025-01-10 PROBLEM — K81.9 CHOLECYSTITIS: Status: ACTIVE | Noted: 2025-01-10

## 2025-01-10 LAB
ALBUMIN SERPL-MCNC: 2.6 G/DL (ref 3.5–5.2)
ALP SERPL-CCNC: 389 U/L (ref 35–104)
ALT SERPL-CCNC: 23 U/L (ref 0–32)
ANION GAP SERPL CALCULATED.3IONS-SCNC: 11 MMOL/L (ref 7–16)
ANION GAP SERPL CALCULATED.3IONS-SCNC: 15 MMOL/L (ref 7–16)
AST SERPL-CCNC: 34 U/L (ref 0–31)
BACTERIA URNS QL MICRO: ABNORMAL
BASOPHILS # BLD: 0.03 K/UL (ref 0–0.2)
BASOPHILS NFR BLD: 0 % (ref 0–2)
BILIRUB SERPL-MCNC: 0.8 MG/DL (ref 0–1.2)
BILIRUB UR QL STRIP: NEGATIVE
BUN SERPL-MCNC: 48 MG/DL (ref 6–23)
BUN SERPL-MCNC: 50 MG/DL (ref 6–23)
CALCIUM SERPL-MCNC: 8 MG/DL (ref 8.6–10.2)
CALCIUM SERPL-MCNC: 8.8 MG/DL (ref 8.6–10.2)
CASTS #/AREA URNS LPF: ABNORMAL /LPF
CHLORIDE SERPL-SCNC: 82 MMOL/L (ref 98–107)
CHLORIDE SERPL-SCNC: 88 MMOL/L (ref 98–107)
CLARITY UR: CLEAR
CO2 SERPL-SCNC: 22 MMOL/L (ref 22–29)
CO2 SERPL-SCNC: 22 MMOL/L (ref 22–29)
COLOR UR: YELLOW
CREAT SERPL-MCNC: 2.6 MG/DL (ref 0.5–1)
CREAT SERPL-MCNC: 2.7 MG/DL (ref 0.5–1)
CREAT UR-MCNC: 168.9 MG/DL (ref 29–226)
EOSINOPHIL # BLD: 0.17 K/UL (ref 0.05–0.5)
EOSINOPHILS RELATIVE PERCENT: 1 % (ref 0–6)
EPI CELLS #/AREA URNS HPF: ABNORMAL /HPF
ERYTHROCYTE [DISTWIDTH] IN BLOOD BY AUTOMATED COUNT: 13.2 % (ref 11.5–15)
GFR, ESTIMATED: 17 ML/MIN/1.73M2
GFR, ESTIMATED: 18 ML/MIN/1.73M2
GLUCOSE SERPL-MCNC: 80 MG/DL (ref 74–99)
GLUCOSE SERPL-MCNC: 85 MG/DL (ref 74–99)
GLUCOSE UR STRIP-MCNC: NEGATIVE MG/DL
HCT VFR BLD AUTO: 29.7 % (ref 34–48)
HGB BLD-MCNC: 9.7 G/DL (ref 11.5–15.5)
HGB UR QL STRIP.AUTO: NEGATIVE
IMM GRANULOCYTES # BLD AUTO: 0.14 K/UL (ref 0–0.58)
IMM GRANULOCYTES NFR BLD: 1 % (ref 0–5)
KETONES UR STRIP-MCNC: NEGATIVE MG/DL
LEUKOCYTE ESTERASE UR QL STRIP: ABNORMAL
LYMPHOCYTES NFR BLD: 0.77 K/UL (ref 1.5–4)
LYMPHOCYTES RELATIVE PERCENT: 5 % (ref 20–42)
MCH RBC QN AUTO: 27.7 PG (ref 26–35)
MCHC RBC AUTO-ENTMCNC: 32.7 G/DL (ref 32–34.5)
MCV RBC AUTO: 84.9 FL (ref 80–99.9)
MONOCYTES NFR BLD: 0.33 K/UL (ref 0.1–0.95)
MONOCYTES NFR BLD: 2 % (ref 2–12)
NEUTROPHILS NFR BLD: 91 % (ref 43–80)
NEUTS SEG NFR BLD: 13.91 K/UL (ref 1.8–7.3)
NITRITE UR QL STRIP: NEGATIVE
OSMOLALITY UR: 259 MOSM/KG (ref 300–900)
PH UR STRIP: 5.5 [PH] (ref 5–9)
PLATELET, FLUORESCENCE: 110 K/UL (ref 130–450)
PMV BLD AUTO: 11.8 FL (ref 7–12)
POTASSIUM SERPL-SCNC: 3.8 MMOL/L (ref 3.5–5)
POTASSIUM SERPL-SCNC: 4.4 MMOL/L (ref 3.5–5)
PROT SERPL-MCNC: 6.5 G/DL (ref 6.4–8.3)
PROT UR STRIP-MCNC: ABNORMAL MG/DL
RBC # BLD AUTO: 3.5 M/UL (ref 3.5–5.5)
RBC # BLD: ABNORMAL 10*6/UL
RBC #/AREA URNS HPF: ABNORMAL /HPF
SODIUM SERPL-SCNC: 119 MMOL/L (ref 132–146)
SODIUM SERPL-SCNC: 121 MMOL/L (ref 132–146)
SODIUM UR-SCNC: 25 MMOL/L
SP GR UR STRIP: 1.02 (ref 1–1.03)
TROPONIN I SERPL HS-MCNC: 70 NG/L (ref 0–9)
UROBILINOGEN UR STRIP-ACNC: 0.2 EU/DL (ref 0–1)
WBC #/AREA URNS HPF: ABNORMAL /HPF
WBC OTHER # BLD: 15.4 K/UL (ref 4.5–11.5)

## 2025-01-10 PROCEDURE — 84300 ASSAY OF URINE SODIUM: CPT

## 2025-01-10 PROCEDURE — 6370000000 HC RX 637 (ALT 250 FOR IP): Performed by: INTERNAL MEDICINE

## 2025-01-10 PROCEDURE — 2580000003 HC RX 258

## 2025-01-10 PROCEDURE — 2500000003 HC RX 250 WO HCPCS: Performed by: INTERNAL MEDICINE

## 2025-01-10 PROCEDURE — 87150 DNA/RNA AMPLIFIED PROBE: CPT

## 2025-01-10 PROCEDURE — 2580000003 HC RX 258: Performed by: INTERNAL MEDICINE

## 2025-01-10 PROCEDURE — 82570 ASSAY OF URINE CREATININE: CPT

## 2025-01-10 PROCEDURE — 87086 URINE CULTURE/COLONY COUNT: CPT

## 2025-01-10 PROCEDURE — 81001 URINALYSIS AUTO W/SCOPE: CPT

## 2025-01-10 PROCEDURE — 83935 ASSAY OF URINE OSMOLALITY: CPT

## 2025-01-10 PROCEDURE — 80053 COMPREHEN METABOLIC PANEL: CPT

## 2025-01-10 PROCEDURE — 87040 BLOOD CULTURE FOR BACTERIA: CPT

## 2025-01-10 PROCEDURE — 1200000000 HC SEMI PRIVATE

## 2025-01-10 PROCEDURE — 76705 ECHO EXAM OF ABDOMEN: CPT

## 2025-01-10 PROCEDURE — 85025 COMPLETE CBC W/AUTO DIFF WBC: CPT

## 2025-01-10 PROCEDURE — 99285 EMERGENCY DEPT VISIT HI MDM: CPT

## 2025-01-10 PROCEDURE — 84484 ASSAY OF TROPONIN QUANT: CPT

## 2025-01-10 PROCEDURE — 93005 ELECTROCARDIOGRAM TRACING: CPT

## 2025-01-10 PROCEDURE — 96361 HYDRATE IV INFUSION ADD-ON: CPT

## 2025-01-10 PROCEDURE — 99223 1ST HOSP IP/OBS HIGH 75: CPT | Performed by: SURGERY

## 2025-01-10 PROCEDURE — 96360 HYDRATION IV INFUSION INIT: CPT

## 2025-01-10 PROCEDURE — 80048 BASIC METABOLIC PNL TOTAL CA: CPT

## 2025-01-10 RX ORDER — SPIRONOLACTONE 25 MG/1
12.5 TABLET ORAL DAILY
Status: DISCONTINUED | OUTPATIENT
Start: 2025-01-10 | End: 2025-01-10

## 2025-01-10 RX ORDER — LOSARTAN POTASSIUM 50 MG/1
50 TABLET ORAL NIGHTLY
Status: DISCONTINUED | OUTPATIENT
Start: 2025-01-10 | End: 2025-01-10

## 2025-01-10 RX ORDER — ACETAMINOPHEN 325 MG/1
650 TABLET ORAL EVERY 6 HOURS PRN
Status: DISCONTINUED | OUTPATIENT
Start: 2025-01-10 | End: 2025-01-18 | Stop reason: HOSPADM

## 2025-01-10 RX ORDER — SODIUM CHLORIDE 0.9 % (FLUSH) 0.9 %
5-40 SYRINGE (ML) INJECTION EVERY 12 HOURS SCHEDULED
Status: DISCONTINUED | OUTPATIENT
Start: 2025-01-10 | End: 2025-01-18 | Stop reason: HOSPADM

## 2025-01-10 RX ORDER — ONDANSETRON 2 MG/ML
4 INJECTION INTRAMUSCULAR; INTRAVENOUS EVERY 6 HOURS PRN
Status: DISCONTINUED | OUTPATIENT
Start: 2025-01-10 | End: 2025-01-18 | Stop reason: HOSPADM

## 2025-01-10 RX ORDER — ACETAMINOPHEN 650 MG/1
650 SUPPOSITORY RECTAL EVERY 6 HOURS PRN
Status: DISCONTINUED | OUTPATIENT
Start: 2025-01-10 | End: 2025-01-18 | Stop reason: HOSPADM

## 2025-01-10 RX ORDER — GABAPENTIN 300 MG/1
300 CAPSULE ORAL EVERY MORNING
Status: DISCONTINUED | OUTPATIENT
Start: 2025-01-11 | End: 2025-01-18 | Stop reason: HOSPADM

## 2025-01-10 RX ORDER — ATORVASTATIN CALCIUM 10 MG/1
10 TABLET, FILM COATED ORAL DAILY
Status: DISCONTINUED | OUTPATIENT
Start: 2025-01-10 | End: 2025-01-18 | Stop reason: HOSPADM

## 2025-01-10 RX ORDER — SODIUM CHLORIDE 9 MG/ML
INJECTION, SOLUTION INTRAVENOUS PRN
Status: DISCONTINUED | OUTPATIENT
Start: 2025-01-10 | End: 2025-01-18 | Stop reason: HOSPADM

## 2025-01-10 RX ORDER — SODIUM CHLORIDE 0.9 % (FLUSH) 0.9 %
5-40 SYRINGE (ML) INJECTION PRN
Status: DISCONTINUED | OUTPATIENT
Start: 2025-01-10 | End: 2025-01-18 | Stop reason: HOSPADM

## 2025-01-10 RX ORDER — METOPROLOL SUCCINATE 50 MG/1
50 TABLET, EXTENDED RELEASE ORAL 2 TIMES DAILY
Status: DISCONTINUED | OUTPATIENT
Start: 2025-01-10 | End: 2025-01-18 | Stop reason: HOSPADM

## 2025-01-10 RX ORDER — SODIUM CHLORIDE 9 MG/ML
INJECTION, SOLUTION INTRAVENOUS CONTINUOUS
Status: DISCONTINUED | OUTPATIENT
Start: 2025-01-10 | End: 2025-01-11

## 2025-01-10 RX ORDER — 0.9 % SODIUM CHLORIDE 0.9 %
1000 INTRAVENOUS SOLUTION INTRAVENOUS ONCE
Status: COMPLETED | OUTPATIENT
Start: 2025-01-10 | End: 2025-01-10

## 2025-01-10 RX ORDER — ONDANSETRON 4 MG/1
4 TABLET, ORALLY DISINTEGRATING ORAL EVERY 8 HOURS PRN
Status: DISCONTINUED | OUTPATIENT
Start: 2025-01-10 | End: 2025-01-18 | Stop reason: HOSPADM

## 2025-01-10 RX ORDER — POLYETHYLENE GLYCOL 3350 17 G/17G
17 POWDER, FOR SOLUTION ORAL DAILY PRN
Status: DISCONTINUED | OUTPATIENT
Start: 2025-01-10 | End: 2025-01-18 | Stop reason: HOSPADM

## 2025-01-10 RX ORDER — AMILORIDE HYDROCHLORIDE AND HYDROCHLOROTHIAZIDE 5; 50 MG/1; MG/1
1 TABLET ORAL DAILY
Status: DISCONTINUED | OUTPATIENT
Start: 2025-01-10 | End: 2025-01-10 | Stop reason: CLARIF

## 2025-01-10 RX ORDER — ENOXAPARIN SODIUM 100 MG/ML
30 INJECTION SUBCUTANEOUS DAILY
Status: DISCONTINUED | OUTPATIENT
Start: 2025-01-10 | End: 2025-01-13

## 2025-01-10 RX ORDER — HYDROCODONE BITARTRATE AND ACETAMINOPHEN 7.5; 325 MG/1; MG/1
1 TABLET ORAL DAILY PRN
Status: DISCONTINUED | OUTPATIENT
Start: 2025-01-10 | End: 2025-01-17

## 2025-01-10 RX ORDER — DICLOFENAC POTASSIUM 50 MG/1
50 TABLET, FILM COATED ORAL EVERY MORNING
COMMUNITY

## 2025-01-10 RX ORDER — DICLOFENAC POTASSIUM 50 MG/1
50 TABLET, FILM COATED ORAL EVERY MORNING
Status: DISCONTINUED | OUTPATIENT
Start: 2025-01-11 | End: 2025-01-10 | Stop reason: CLARIF

## 2025-01-10 RX ORDER — IBUPROFEN 200 MG
200 TABLET ORAL
Status: DISCONTINUED | OUTPATIENT
Start: 2025-01-11 | End: 2025-01-10

## 2025-01-10 RX ORDER — GABAPENTIN 300 MG/1
300 CAPSULE ORAL EVERY MORNING
COMMUNITY

## 2025-01-10 RX ORDER — HYDROCHLOROTHIAZIDE 25 MG/1
25 TABLET ORAL DAILY
Status: DISCONTINUED | OUTPATIENT
Start: 2025-01-10 | End: 2025-01-10

## 2025-01-10 RX ADMIN — SODIUM CHLORIDE 1000 ML: 9 INJECTION, SOLUTION INTRAVENOUS at 11:35

## 2025-01-10 RX ADMIN — METOPROLOL SUCCINATE 50 MG: 50 TABLET, EXTENDED RELEASE ORAL at 23:16

## 2025-01-10 RX ADMIN — SODIUM CHLORIDE: 9 INJECTION, SOLUTION INTRAVENOUS at 22:58

## 2025-01-10 RX ADMIN — SODIUM CHLORIDE, PRESERVATIVE FREE 10 ML: 5 INJECTION INTRAVENOUS at 22:27

## 2025-01-10 ASSESSMENT — PAIN SCALES - GENERAL: PAINLEVEL_OUTOF10: 0

## 2025-01-10 ASSESSMENT — PAIN - FUNCTIONAL ASSESSMENT: PAIN_FUNCTIONAL_ASSESSMENT: NONE - DENIES PAIN

## 2025-01-10 ASSESSMENT — LIFESTYLE VARIABLES
HOW OFTEN DO YOU HAVE A DRINK CONTAINING ALCOHOL: NEVER
HOW MANY STANDARD DRINKS CONTAINING ALCOHOL DO YOU HAVE ON A TYPICAL DAY: PATIENT DOES NOT DRINK

## 2025-01-10 NOTE — CONSULTS
Department of Internal Medicine  Nephrology Attending Consult Note      Reason for Consult: Hyponatremia  Requesting Physician:  Jose E Squires MD    CHIEF COMPLAINT: Abdominal pain and weakness    History Obtained From:  patient, electronic medical record    HISTORY OF PRESENT ILLNESS:  Briefly Mrs. Haskins is a 84-year-old female with history of HTN, type II DM, metastatic melanoma chemotherapy at [facility], COPD, cholelithiasis and choledocho lithiasis who had recent ERCP with removal of stone, hypothyroidism, hyperlipidemia, DJD, who was admitted on 1/10/2025 after he presented to the ER with generalized weakness and abdominal pain, gallbladder ultrasound showed cholelithiasis with mild gallbladder wall thickening with positive sonographic Macdonald sign concerning for acute cholecystitis, on admission she was found to have hyponatremia with a sodium level of 119 mEq/L and with elevated creatinine of 2.7 mg/dL, reasons for this consultation. Her medications prior to admission included amiloride-HCTZ, losartan. Her baseline creatinine is 0.9-1 mg/dL.     Past Medical History:        Diagnosis Date    Atypical mole 3/16/2021    Chronic obstructive lung disease (HCC)     Diet-controlled diabetes mellitus (HCC) 9/12/2019    DJD (degenerative joint disease)     multiple joints    Heart disease     benign hypertensive heart disease without congestive heart failure    Hyperlipidemia     Hypertension     Hypothyroidism     Type 2 diabetes mellitus without complication (HCC)      Past Surgical History:        Procedure Laterality Date    ANKLE SURGERY Right     ERCP N/A 1/3/2025    ENDOSCOPIC RETROGRADE CHOLANGIOPANCREATOGRAPHY STONE REMOVAL performed by Herson Emmanuel MD at [facility] ENDOSCOPY    ERCP N/A 1/3/2025    ENDOSCOPIC RETROGRADE CHOLANGIOPANCREATOGRAPHY STENT INSERTION performed by Herson Emmanuel MD at [facility] ENDOSCOPY    SHOULDER SURGERY Left      Current Medications:    Current Facility-Administered

## 2025-01-10 NOTE — PROGRESS NOTES
Cancel Piperacillin-tazobactam order due to listed penicillin allergy.  Edyta Calix RPh 1/10/2025 4:07 PM

## 2025-01-10 NOTE — CONSULTS
General Surgery   Consult Note      Patient's Name/Date of Birth: Colleen Qiu / 1940    Date: January 10, 2025     PCP: Edmond Mg DO     Chief Complaint: No chief complaint on file.    HPI:   Colleen Qiu is a 84 y.o. female who presents to the general surgery service for evaluation of abdominal pain. Patient is known to surgery due to cholelithiasis and she recently underwent ERCP with CBD stent placement 1/3. Patient presented today due to altered mental status and was found to be hyponatremic. She denies any abdominal pain, nausea, or vomiting. States she has been having normal bowel movements. Denies melena or hematochezia.     Patient is afebrile and hemodynamically stable. WBC 15.4. Na 119. LFTs and bilirubin WNL. Cr 2.6, BUN 48. RUQ US demonstrates cholelithiasis with mild gallbladder wall thickening.     Patient Active Problem List   Diagnosis    Intervertebral lumbar disc disorder with myelopathy, lumbar region    Essential hypertension    Mixed hyperlipidemia    Acquired hypothyroidism    Age-related osteoporosis without current pathological fracture    Pre-diabetes    Primary osteoarthritis involving multiple joints    Malignant melanoma of torso excluding breast (HCC)    Symptomatic cholelithiasis    Choledocholithiasis       Allergies   Allergen Reactions    Pcn [Penicillins] Itching and Rash       Past Medical History:   Diagnosis Date    Atypical mole 3/16/2021    Chronic obstructive lung disease (HCC)     Diet-controlled diabetes mellitus (HCC) 9/12/2019    DJD (degenerative joint disease)     multiple joints    Heart disease     benign hypertensive heart disease without congestive heart failure    Hyperlipidemia     Hypertension     Hypothyroidism     Type 2 diabetes mellitus without complication (HCC)        Past Surgical History:   Procedure Laterality Date    ANKLE SURGERY Right     ERCP N/A 1/3/2025    ENDOSCOPIC RETROGRADE CHOLANGIOPANCREATOGRAPHY STONE

## 2025-01-10 NOTE — PROGRESS NOTES
Chart reviewed, full consultation to follow.    Briefly Mrs. Haskins is a 84-year-old female with history of HTN, type II DM, COPD, cholelithiasis and choledocho lithiasis who had recent ERCP with removal of stone, hypothyroidism, hyperlipidemia, DJD, who was admitted on 1/10/2025 after he presented to the ER with generalized weakness and abdominal pain, gallbladder ultrasound showed cholelithiasis with mild gallbladder wall thickening with positive sonographic Macdonald sign concerning for acute cholecystitis, on admission she was found to have hyponatremia with a sodium level of 119 mEq/L and with elevated creatinine of 2.7 mg/dL, reasons for this consultation.  Her medications prior to admission included amiloride-HCTZ, losartan.  Her baseline creatinine is 0.9-1 mg/dL.    PERCY stage II, volume responsive PERCY (poor intake, diuretics) in the setting of ARB and NSAIDs administration,     Hypotonic hyponatremia, multifactorial, 2/2 lack of free water excretion due to decreased GFR (PERCY) and possibly thiazide induced, in the setting of decreased solute intake and NSAIDs administration,urine specific gravity 1.020, urine sodium 25, urine osmolality 259    HTN, on metoprolol  Normocytic anemia, to obtain iron studies, B12 and folate  -----------------------------------------------  Acute cholecystitis  Recent choledocholithiasis status post ERCP  Hyperlipidemia, on atorvastatin  Type II DM    Plan:    Continue NS at 100 cc/hour  Discontinue losartan  Discontinue spironolactone and HCTZ  Discontinue NSAIDs  Continue metoprolol succinate 50 mg twice daily  Monitor sodium level, avoid rapid correction of sodium, <8/24-hours  Monitor renal function    Adrianna Elmore MD

## 2025-01-10 NOTE — PROGRESS NOTES
Database initiated. Patient is A&O comes in from home alone. States she uses a cane and a walker and is RA at baseline.

## 2025-01-10 NOTE — H&P
[facility] Hospitalist Group History and Physical      CHIEF COMPLAINT:  AMS     History of Present Illness:      This is an 84 year old female with past medical history of COPD, BPH without CHF, HLD, HTN, hypothyroidism, diet controlled diabetes, cholelithiasis who presents to ER today because family said she hasn't been acting like herself lately and had altered mental status. She recently had ERCP with CBD stent placement on 1/3/25 with Dr Emmanuel. BP 91/50 on arrival.  Lab workup: Na 119 > 121, Cl 82 > 88, Cr 2.7 < 2.6, Alk phos 389, AST 34, ALT 23, WBC 15.4, Hgb 9.7, EKG NSR. Us gallbladder cholelithiasis with mild gallbladder wall thickening and positive Macdonald sign, concerning for acute cholecystitis. Surgery consulted, no intervention at this time. HIDA scan tomorrow. She was given 1 L bolus in ER. Decision to admit.     Informant(s) for H&P: patient, epic     REVIEW OF SYSTEMS:  A comprehensive review of systems was negative except for: what is in the HPI    PMH:  Past Medical History:   Diagnosis Date    Atypical mole 3/16/2021    Chronic obstructive lung disease (HCC)     Diet-controlled diabetes mellitus (HCC) 9/12/2019    DJD (degenerative joint disease)     multiple joints    Heart disease     benign hypertensive heart disease without congestive heart failure    Hyperlipidemia     Hypertension     Hypothyroidism     Type 2 diabetes mellitus without complication (HCC)      Surgical History:  Past Surgical History:   Procedure Laterality Date    ANKLE SURGERY Right     ERCP N/A 1/3/2025    ENDOSCOPIC RETROGRADE CHOLANGIOPANCREATOGRAPHY STONE REMOVAL [place] [facility] [place]    ERCP N/A 1/3/2025    ENDOSCOPIC RETROGRADE CHOLANGIOPANCREATOGRAPHY STENT INSERTION [place] [facility] [place]    SHOULDER SURGERY Left      Medications Prior to Admission:    Prior to Admission medications    Medication Sig Start Date End Date Taking? Authorizing Provider    diclofenac (CATAFLAM) 50 MG tablet Take 1 tablet by mouth every morning   Yes Jeremie Mari MD   gabapentin (NEURONTIN) 300 MG capsule Take 1 capsule by mouth every morning.   Yes Jeremie Mari MD   aMILoride-HCTZ 5-50 MG TABS Take 1 tablet by mouth daily 11/11/24  Yes Edmond Mg DO   metoprolol succinate (TOPROL XL) 50 MG extended release tablet Take 1 tablet by mouth in the morning and 1 tablet in the evening. 11/24/23  Yes Edmond Mg DO   losartan (COZAAR) 50 MG tablet Take 1 tablet by mouth daily  Patient taking differently: Take 1 tablet by mouth at bedtime 11/24/23  Yes Edmond Mg DO   simvastatin (ZOCOR) 20 MG tablet Take 1 tablet by mouth nightly 1 po qd --hs--cholesterol 11/24/23  Yes Edmond Mg DO   HYDROcodone-acetaminophen (NORCO) 7.5-325 MG per tablet Take 1 tablet by mouth daily as needed for Pain.   Yes Jeremie Mari MD     Allergies:    Pcn [penicillins]  Social History:    reports that she quit smoking about 13 years ago. Her smoking use included cigarettes. She started smoking about 38 years ago. She has never used smokeless tobacco. She reports that she does not drink alcohol and does not use drugs.  Family History:   family history is not on file.       PHYSICAL EXAM:  Vitals:  BP (!) 88/46   Pulse 63   Temp 98.1 °F (36.7 °C) (Oral)   Resp 14   Wt 70.3 kg (155 lb)   SpO2 100%   BMI 29.29 kg/m²   General Appearance: alert and oriented to person, place and time and in no acute distress  Skin: warm and dry  Head: normocephalic and atraumatic  Pulmonary/Chest: clear to auscultation bilaterally- no wheezes, rales or rhonchi, normal air movement, no respiratory distress  Cardiovascular: normal rate, normal S1 and S2 and no carotid bruits  Abdomen: tender, non-distended, normal bowel sounds  Extremities: no cyanosis, no clubbing and no edema  Neurologic: no cranial nerve deficit and speech normal      LABS:  Recent Labs     01/10/25  1133

## 2025-01-10 NOTE — ED PROVIDER NOTES
[facility] EMERGENCY DEPARTMENT  EMERGENCY DEPARTMENT ENCOUNTER        Pt Name: Colleen Qiu  MRN: [id number]  Birthdate 1940  Date of evaluation: 1/10/2025  Provider: Jose E Oakley MD  PCP: Edmond Mg DO  Note Started: 11:12 AM EST 1/10/25    CHIEF COMPLAINT       Fatigue, abdominal pain     HISTORY OF PRESENT ILLNESS: 1 or more Elements   History From: Patient    Limitations to history : None  Social Determinants : None    Colleen Qiu is a 84 y.o. female past medical history of COPD, hypothyroidism, hypertension, type 2 diabetes who presents to the emergency department complaints of generalized weakness, and abdominal pain.  Patient states that symptoms started today.  Patient son at bedside who provides the history.  He states that yesterday patient was at her baseline.  But this morning had trouble getting patient up from bed.  Patient believes that she might of taken too much of her blood pressure medication.  She states that she is also having some right upper quadrant pain.  She recently had an ERCP for choledocholithiasis.  She states that nothing seems to making her symptoms better or worse.  She denies any fever, chest pain, shortness of breath, cough, abdominal pain.    Nursing Notes were all reviewed and agreed with or any disagreements were addressed in the HPI.    ROS:   Pertinent positives and negatives are stated within HPI, all other systems reviewed and are negative.      --------------------------------------------- PAST HISTORY ---------------------------------------------  Past Medical History:  has a past medical history of Atypical mole, Chronic obstructive lung disease (HCC), Diet-controlled diabetes mellitus (HCC), DJD (degenerative joint disease), Heart disease, Hyperlipidemia, Hypertension, Hypothyroidism, and Type 2 diabetes mellitus without complication (HCC).    Past Surgical History:  has a past  (NEURONTIN) capsule 300 mg (has no administration in time range)   HYDROcodone-acetaminophen (NORCO) 7.5-325 MG per tablet 1 tablet (has no administration in time range)   losartan (COZAAR) tablet 50 mg (has no administration in time range)   metoprolol succinate (TOPROL XL) extended release tablet 50 mg (has no administration in time range)   atorvastatin (LIPITOR) tablet 10 mg (has no administration in time range)   sodium chloride flush 0.9 % injection 5-40 mL (has no administration in time range)   sodium chloride flush 0.9 % injection 5-40 mL (has no administration in time range)   0.9 % sodium chloride infusion (has no administration in time range)   enoxaparin Sodium (LOVENOX) injection 30 mg (has no administration in time range)   ondansetron (ZOFRAN-ODT) disintegrating tablet 4 mg (has no administration in time range)     Or   ondansetron (ZOFRAN) injection 4 mg (has no administration in time range)   polyethylene glycol (GLYCOLAX) packet 17 g (has no administration in time range)   acetaminophen (TYLENOL) tablet 650 mg (has no administration in time range)     Or   acetaminophen (TYLENOL) suppository 650 mg (has no administration in time range)   piperacillin-tazobactam (ZOSYN) 3,375 mg in sodium chloride 0.9 % 50 mL IVPB (has no administration in time range)   sodium chloride 0.9 % bolus 1,000 mL (0 mLs IntraVENous Stopped 1/10/25 4577)       New Prescriptions    No medications on file         Counseling:   The emergency provider has spoken with the patient and discussed today’s results, in addition to providing specific details for the plan of care and counseling regarding the diagnosis and prognosis.  Questions are answered at this time and they are agreeable with the plan.       --------------------------------- IMPRESSION AND DISPOSITION ---------------------------------    IMPRESSION  1. Hyponatremia    2. Hypochloremia    3. PERCY (acute kidney injury) (HCC)    4. Abdominal pain, right upper quadrant

## 2025-01-11 ENCOUNTER — APPOINTMENT (OUTPATIENT)
Dept: NUCLEAR MEDICINE | Age: 85
DRG: 853 | End: 2025-01-11
Payer: MEDICARE

## 2025-01-11 PROBLEM — E87.1 HYPONATREMIA: Status: ACTIVE | Noted: 2025-01-11

## 2025-01-11 PROBLEM — D69.6 THROMBOCYTOPENIA (HCC): Status: ACTIVE | Noted: 2025-01-11

## 2025-01-11 PROBLEM — E11.9 CONTROLLED TYPE 2 DIABETES MELLITUS WITHOUT COMPLICATION (HCC): Status: ACTIVE | Noted: 2025-01-11

## 2025-01-11 PROBLEM — N17.9 AKI (ACUTE KIDNEY INJURY) (HCC): Status: ACTIVE | Noted: 2025-01-11

## 2025-01-11 LAB
ALBUMIN SERPL-MCNC: 2.4 G/DL (ref 3.5–5.2)
ALP SERPL-CCNC: 407 U/L (ref 35–104)
ALT SERPL-CCNC: 28 U/L (ref 0–32)
ANION GAP SERPL CALCULATED.3IONS-SCNC: 14 MMOL/L (ref 7–16)
ANION GAP SERPL CALCULATED.3IONS-SCNC: 7 MMOL/L (ref 7–16)
AST SERPL-CCNC: 43 U/L (ref 0–31)
BASOPHILS # BLD: 0.02 K/UL (ref 0–0.2)
BASOPHILS NFR BLD: 0 % (ref 0–2)
BILIRUB SERPL-MCNC: 0.6 MG/DL (ref 0–1.2)
BUN SERPL-MCNC: 42 MG/DL (ref 6–23)
BUN SERPL-MCNC: 47 MG/DL (ref 6–23)
CALCIUM SERPL-MCNC: 8.3 MG/DL (ref 8.6–10.2)
CALCIUM SERPL-MCNC: 8.7 MG/DL (ref 8.6–10.2)
CHLORIDE SERPL-SCNC: 88 MMOL/L (ref 98–107)
CHLORIDE SERPL-SCNC: 95 MMOL/L (ref 98–107)
CO2 SERPL-SCNC: 19 MMOL/L (ref 22–29)
CO2 SERPL-SCNC: 22 MMOL/L (ref 22–29)
CREAT SERPL-MCNC: 1.6 MG/DL (ref 0.5–1)
CREAT SERPL-MCNC: 2.2 MG/DL (ref 0.5–1)
EOSINOPHIL # BLD: 0.37 K/UL (ref 0.05–0.5)
EOSINOPHILS RELATIVE PERCENT: 3 % (ref 0–6)
ERYTHROCYTE [DISTWIDTH] IN BLOOD BY AUTOMATED COUNT: 13.5 % (ref 11.5–15)
GFR, ESTIMATED: 22 ML/MIN/1.73M2
GFR, ESTIMATED: 32 ML/MIN/1.73M2
GLUCOSE BLD-MCNC: 63 MG/DL (ref 74–99)
GLUCOSE BLD-MCNC: 88 MG/DL (ref 74–99)
GLUCOSE SERPL-MCNC: 120 MG/DL (ref 74–99)
GLUCOSE SERPL-MCNC: 54 MG/DL (ref 74–99)
HCT VFR BLD AUTO: 31.7 % (ref 34–48)
HGB BLD-MCNC: 10.2 G/DL (ref 11.5–15.5)
IMM GRANULOCYTES # BLD AUTO: 0.08 K/UL (ref 0–0.58)
IMM GRANULOCYTES NFR BLD: 1 % (ref 0–5)
LYMPHOCYTES NFR BLD: 0.69 K/UL (ref 1.5–4)
LYMPHOCYTES RELATIVE PERCENT: 6 % (ref 20–42)
MCH RBC QN AUTO: 27.1 PG (ref 26–35)
MCHC RBC AUTO-ENTMCNC: 32.2 G/DL (ref 32–34.5)
MCV RBC AUTO: 84.3 FL (ref 80–99.9)
MICROORGANISM SPEC CULT: NO GROWTH
MONOCYTES NFR BLD: 0.24 K/UL (ref 0.1–0.95)
MONOCYTES NFR BLD: 2 % (ref 2–12)
NEUTROPHILS NFR BLD: 87 % (ref 43–80)
NEUTS SEG NFR BLD: 9.66 K/UL (ref 1.8–7.3)
PLATELET # BLD AUTO: 129 K/UL (ref 130–450)
PMV BLD AUTO: 12.2 FL (ref 7–12)
POTASSIUM SERPL-SCNC: 4.1 MMOL/L (ref 3.5–5)
POTASSIUM SERPL-SCNC: 4.2 MMOL/L (ref 3.5–5)
PROT SERPL-MCNC: 6.5 G/DL (ref 6.4–8.3)
RBC # BLD AUTO: 3.76 M/UL (ref 3.5–5.5)
SERVICE CMNT-IMP: NORMAL
SODIUM SERPL-SCNC: 121 MMOL/L (ref 132–146)
SODIUM SERPL-SCNC: 124 MMOL/L (ref 132–146)
SPECIMEN DESCRIPTION: NORMAL
WBC OTHER # BLD: 11.1 K/UL (ref 4.5–11.5)

## 2025-01-11 PROCEDURE — 6360000002 HC RX W HCPCS: Performed by: INTERNAL MEDICINE

## 2025-01-11 PROCEDURE — 78226 HEPATOBILIARY SYSTEM IMAGING: CPT

## 2025-01-11 PROCEDURE — 99232 SBSQ HOSP IP/OBS MODERATE 35: CPT | Performed by: INTERNAL MEDICINE

## 2025-01-11 PROCEDURE — A9537 TC99M MEBROFENIN: HCPCS | Performed by: RADIOLOGY

## 2025-01-11 PROCEDURE — 36415 COLL VENOUS BLD VENIPUNCTURE: CPT

## 2025-01-11 PROCEDURE — 99232 SBSQ HOSP IP/OBS MODERATE 35: CPT | Performed by: SURGERY

## 2025-01-11 PROCEDURE — 80053 COMPREHEN METABOLIC PANEL: CPT

## 2025-01-11 PROCEDURE — 2500000003 HC RX 250 WO HCPCS: Performed by: INTERNAL MEDICINE

## 2025-01-11 PROCEDURE — 2580000003 HC RX 258: Performed by: INTERNAL MEDICINE

## 2025-01-11 PROCEDURE — 6370000000 HC RX 637 (ALT 250 FOR IP): Performed by: INTERNAL MEDICINE

## 2025-01-11 PROCEDURE — 2500000003 HC RX 250 WO HCPCS: Performed by: SURGERY

## 2025-01-11 PROCEDURE — 1200000000 HC SEMI PRIVATE

## 2025-01-11 PROCEDURE — 3430000000 HC RX DIAGNOSTIC RADIOPHARMACEUTICAL: Performed by: RADIOLOGY

## 2025-01-11 PROCEDURE — 82962 GLUCOSE BLOOD TEST: CPT

## 2025-01-11 PROCEDURE — 6360000002 HC RX W HCPCS: Performed by: SURGERY

## 2025-01-11 PROCEDURE — 80048 BASIC METABOLIC PNL TOTAL CA: CPT

## 2025-01-11 PROCEDURE — 85025 COMPLETE CBC W/AUTO DIFF WBC: CPT

## 2025-01-11 RX ORDER — SODIUM CHLORIDE 1 G/1
1 TABLET ORAL
Status: DISCONTINUED | OUTPATIENT
Start: 2025-01-11 | End: 2025-01-13

## 2025-01-11 RX ORDER — METRONIDAZOLE 500 MG/100ML
500 INJECTION, SOLUTION INTRAVENOUS EVERY 8 HOURS
Status: DISCONTINUED | OUTPATIENT
Start: 2025-01-11 | End: 2025-01-17

## 2025-01-11 RX ORDER — DEXTROSE MONOHYDRATE AND SODIUM CHLORIDE 5; .9 G/100ML; G/100ML
INJECTION, SOLUTION INTRAVENOUS CONTINUOUS
Status: DISCONTINUED | OUTPATIENT
Start: 2025-01-11 | End: 2025-01-16

## 2025-01-11 RX ADMIN — Medication 1 G: at 12:58

## 2025-01-11 RX ADMIN — Medication 6 MILLICURIE: at 08:49

## 2025-01-11 RX ADMIN — ENOXAPARIN SODIUM 30 MG: 100 INJECTION SUBCUTANEOUS at 12:59

## 2025-01-11 RX ADMIN — WATER 1000 MG: 1 INJECTION INTRAMUSCULAR; INTRAVENOUS; SUBCUTANEOUS at 12:58

## 2025-01-11 RX ADMIN — ATORVASTATIN CALCIUM 10 MG: 10 TABLET, FILM COATED ORAL at 11:16

## 2025-01-11 RX ADMIN — METRONIDAZOLE 500 MG: 500 INJECTION, SOLUTION INTRAVENOUS at 20:50

## 2025-01-11 RX ADMIN — METOPROLOL SUCCINATE 50 MG: 50 TABLET, EXTENDED RELEASE ORAL at 21:06

## 2025-01-11 RX ADMIN — METRONIDAZOLE 500 MG: 500 INJECTION, SOLUTION INTRAVENOUS at 13:06

## 2025-01-11 RX ADMIN — DEXTROSE AND SODIUM CHLORIDE: 5; 900 INJECTION, SOLUTION INTRAVENOUS at 13:25

## 2025-01-11 RX ADMIN — SODIUM CHLORIDE, PRESERVATIVE FREE 10 ML: 5 INJECTION INTRAVENOUS at 11:17

## 2025-01-11 RX ADMIN — Medication 1 G: at 17:42

## 2025-01-11 RX ADMIN — ONDANSETRON 4 MG: 2 INJECTION, SOLUTION INTRAMUSCULAR; INTRAVENOUS at 11:21

## 2025-01-11 RX ADMIN — HYDROCODONE BITARTRATE AND ACETAMINOPHEN 1 TABLET: 7.5; 325 TABLET ORAL at 21:08

## 2025-01-11 ASSESSMENT — PAIN DESCRIPTION - DESCRIPTORS: DESCRIPTORS: ACHING

## 2025-01-11 ASSESSMENT — PAIN DESCRIPTION - LOCATION: LOCATION: KNEE

## 2025-01-11 ASSESSMENT — PAIN - FUNCTIONAL ASSESSMENT: PAIN_FUNCTIONAL_ASSESSMENT: PREVENTS OR INTERFERES SOME ACTIVE ACTIVITIES AND ADLS

## 2025-01-11 ASSESSMENT — PAIN DESCRIPTION - ORIENTATION: ORIENTATION: RIGHT;LEFT

## 2025-01-11 ASSESSMENT — PAIN SCALES - GENERAL: PAINLEVEL_OUTOF10: 6

## 2025-01-11 NOTE — PROGRESS NOTES
4 Eyes Skin Assessment     NAME:  Colleen Qiu  YOB: 1940  MEDICAL RECORD NUMBER:  [id number]    The patient is being assessed for  Admission    I agree that at least one RN has performed a thorough Head to Toe Skin Assessment on the patient. ALL assessment sites listed below have been assessed.      Areas assessed by both nurses:    Head, Face, Ears, Shoulders, Back, Chest, Arms, Elbows, Hands, Sacrum. Buttock, Coccyx, Ischium, and Legs. Feet and Heels        Does the Patient have a Wound? No noted wound(s)       Aneesh Prevention initiated by RN: Yes  Wound Care Orders initiated by RN: No    Pressure Injury (Stage 3,4, Unstageable, DTI, NWPT, and Complex wounds) if present, place Wound referral order by RN under : No    New Ostomies, if present place, Ostomy referral order under : No     Nurse 1 eSignature: Electronically signed by Araceli Koch RN on 1/11/25 at 3:04 AM EST    **SHARE this note so that the co-signing nurse can place an eSignature**    Nurse 2 eSignature: {Esignature:[id number]}

## 2025-01-11 NOTE — PROGRESS NOTES
Department of Internal Medicine  Nephrology Attending progress Note      Reason for Consult: Hyponatremia  Requesting Physician:  Jose E Squires MD    CHIEF COMPLAINT: Abdominal pain and weakness. Patient is somewhat drowsy         Past Medical History:        Diagnosis Date    Atypical mole 3/16/2021    Chronic obstructive lung disease (HCC)     Diet-controlled diabetes mellitus (HCC) 9/12/2019    DJD (degenerative joint disease)     multiple joints    Heart disease     benign hypertensive heart disease without congestive heart failure    Hyperlipidemia     Hypertension     Hypothyroidism     Type 2 diabetes mellitus without complication (HCC)      Past Surgical History:        Procedure Laterality Date    ANKLE SURGERY Right     ERCP N/A 1/3/2025    ENDOSCOPIC RETROGRADE CHOLANGIOPANCREATOGRAPHY STONE REMOVAL performed by Herson Emmanuel MD [place] [facility] [place]    ERCP N/A 1/3/2025    ENDOSCOPIC RETROGRADE CHOLANGIOPANCREATOGRAPHY STENT INSERTION performed by Herson Emmanuel MD at [facility] [place]    SHOULDER SURGERY Left      Current Medications:    Current Facility-Administered Medications: sodium chloride tablet 1 g, 1 g, Oral, TID WC  0.9 % sodium chloride infusion, , IntraVENous, Continuous  gabapentin (NEURONTIN) capsule 300 mg, 300 mg, Oral, QAM  HYDROcodone-acetaminophen (NORCO) 7.5-325 MG per tablet 1 tablet, 1 tablet, Oral, Daily PRN  metoprolol succinate (TOPROL XL) extended release tablet 50 mg, 50 mg, Oral, BID  atorvastatin (LIPITOR) tablet 10 mg, 10 mg, Oral, Daily  sodium chloride flush 0.9 % injection 5-40 mL, 5-40 mL, IntraVENous, 2 times per day  sodium chloride flush 0.9 % injection 5-40 mL, 5-40 mL, IntraVENous, PRN  0.9 % sodium chloride infusion, , IntraVENous, PRN  enoxaparin Sodium (LOVENOX) injection 30 mg, 30 mg, SubCUTAneous, Daily  ondansetron (ZOFRAN-ODT) disintegrating tablet 4 mg, 4 mg, Oral, Q8H PRN **OR** ondansetron (ZOFRAN) injection 4 mg, 4 mg, IntraVENous, Q6H  05:57 AM     CMP:    Lab Results   Component Value Date/Time     01/11/2025 05:57 AM    K 4.2 01/11/2025 05:57 AM    CL 88 01/11/2025 05:57 AM    CO2 19 01/11/2025 05:57 AM    BUN 47 01/11/2025 05:57 AM    CREATININE 2.2 01/11/2025 05:57 AM    CREATININE 1.0 03/06/2019 12:00 AM    GFRAA >60 09/14/2022 10:35 AM    LABGLOM 22 01/11/2025 05:57 AM    LABGLOM >60 02/23/2024 10:05 AM    GLUCOSE 54 01/11/2025 05:57 AM    CALCIUM 8.7 01/11/2025 05:57 AM    BILITOT 0.6 01/11/2025 05:57 AM    ALKPHOS 407 01/11/2025 05:57 AM    AST 43 01/11/2025 05:57 AM    ALT 28 01/11/2025 05:57 AM     Magnesium:  No results found for: \"MG\"  Phosphorus:  No results found for: \"PHOS\"    Radiology Review:      US GALLBLADDER RUQ [[id number] 1/10/2025]     IMPRESSION:  Cholelithiasis with mild gallbladder wall thickening and positive sonographic  Macdonald sign, concerning for acute cholecystitis.    IMPRESSION/RECOMMENDATIONS:      Briefly Mrs. Haskins is a 84-year-old female with history of HTN, type II DM, metastatic melanoma chemotherapy at [facility], COPD, cholelithiasis and choledocho lithiasis who had recent ERCP with removal of stone, hypothyroidism, hyperlipidemia, DJD, who was admitted on 1/10/2025 after he presented to the ER with generalized weakness and abdominal pain, gallbladder ultrasound showed cholelithiasis with mild gallbladder wall thickening with positive sonographic Macdonald sign concerning for acute cholecystitis, on admission she was found to have hyponatremia with a sodium level of 119 mEq/L and with elevated creatinine of 2.7 mg/dL, reasons for this consultation. Her medications prior to admission included amiloride-HCTZ, losartan. Her baseline creatinine is 0.9-1 mg/dL.      PERCY stage II, volume responsive PERCY (poor intake, diuretics) in the setting of ARB and NSAIDs administration,      Hypotonic hyponatremia, multifactorial, 2/2 lack of free water excretion due to decreased GFR (PERCY) and possibly thiazide induced, in the

## 2025-01-11 NOTE — ED NOTES
ED to Inpatient Handoff Report    Notified [address] that electronic handoff available and patient ready for transport to room 504.    Safety Risks: Risk of falls    Patient in Restraints: no    Constant Observer or Patient : no    Telemetry Monitoring Ordered :No           Order to transfer to unit without monitor:N/A    Last MEWS: 1 Time completed: 1924    Deterioration Index Score:   Predictive Model Details          41 (Caution)  Factor Value    Calculated 1/10/2025 21:29 35% Respiratory rate 10    Deterioration Index Model 31% Age 84 years old     13% Systolic 95     9% Sodium abnormal (121 mmol/L)     8% WBC count abnormal (15.4 k/uL)     2% BUN abnormal (48 mg/dL)     1% Hematocrit abnormal (29.7 %)     1% Pulse 65     0% Potassium 3.8 mmol/L     0% Temperature 97.6 °F (36.4 °C)     0% Pulse oximetry 96 %        Vitals:    01/10/25 1231 01/10/25 1616 01/10/25 1924 01/10/25 1929   BP: (!) 88/46 (!) 101/49 (!) 95/51 (!) 95/51   Pulse: 63 68 64 65   Resp:  16 14 10   Temp:  98.1 °F (36.7 °C) 97.6 °F (36.4 °C)    TempSrc:   Oral    SpO2:  98% 96% 96%   Weight:             Opportunity for questions and clarification was provided.

## 2025-01-11 NOTE — PROGRESS NOTES
[facility] Hospitalist   Progress Note    Admitting Date and Time: 1/10/2025 11:10 AM  Admit Dx: Abdominal pain, right upper quadrant [R10.11]  Hypochloremia [E87.8]  Cholecystitis [K81.9]  Hyponatremia [E87.1]  PERCY (acute kidney injury) (HCC) [N17.9]    Subjective:    Patient was admitted with Abdominal pain, right upper quadrant [R10.11]  Hypochloremia [E87.8]  Cholecystitis [K81.9]  Hyponatremia [E87.1]  PERCY (acute kidney injury) (HCC) [N17.9]. Patient  denies fever, chills, cp, sob, n/v.     sodium chloride  1 g Oral TID WC    cefTRIAXone (ROCEPHIN) IV  1,000 mg IntraVENous Q24H    metroNIDAZOLE  500 mg IntraVENous Q8H    gabapentin  300 mg Oral QAM    metoprolol succinate  50 mg Oral BID    atorvastatin  10 mg Oral Daily    sodium chloride flush  5-40 mL IntraVENous 2 times per day    enoxaparin  30 mg SubCUTAneous Daily     HYDROcodone-acetaminophen, 1 tablet, Daily PRN  sodium chloride flush, 5-40 mL, PRN  sodium chloride, , PRN  ondansetron, 4 mg, Q8H PRN   Or  ondansetron, 4 mg, Q6H PRN  polyethylene glycol, 17 g, Daily PRN  acetaminophen, 650 mg, Q6H PRN   Or  acetaminophen, 650 mg, Q6H PRN         Objective:    BP (!) 114/46   Pulse 69   Temp 97.5 °F (36.4 °C) (Oral)   Resp 16   Ht 1.549 m (5' 1\")   Wt 72.1 kg (159 lb)   SpO2 94%   BMI 30.04 kg/m²   Skin: warm and dry, no rash or erythema  Pulmonary/Chest: clear to auscultation bilaterally- no wheezes, rales or rhonchi, normal air movement, no respiratory distress  Cardiovascular: rhythm reg at rate of 72  Abdomen: soft, non-tender, non-distended, normal bowel sounds, no masses or organomegaly  Extremities: no cyanosis, no clubbing, and no edema      Recent Labs     01/10/25  1133 01/10/25  1437 01/11/25  0557   * 121* 121*   K 4.4 3.8 4.2   CL 82* 88* 88*   CO2 22 22 19*   BUN 50* 48* 47*   CREATININE 2.7* 2.6* 2.2*   GLUCOSE 85 80 54*   CALCIUM 8.8 8.0* 8.7       Recent Labs     01/10/25  1133 01/11/25  0557   WBC 15.4*

## 2025-01-11 NOTE — PROGRESS NOTES
General Surgery Progress Note    Subjective:  No new issues since yesterday.    Objective:  Vital signs reviewed  Gen: awake, alert, in NAD  HEENT: NCAT  Resp: non-labored breathing  CV: regular rate, distal pulses intact  Abd: soft, nondistended, nontender  Skin: warm, well perfused    Assessment/Plan:  Admit for AMS and hyponatremia  Recent history of choledocholithiasis s/p ERCP with stent  HIDA scan with non-filling of gallbladder, either due to cystic duct obstruction or could possibly be due to biliary stent  Okay for diet, NPO at midnight  Tentative plan for cholecystectomy tomorrow per Dr. Chambers pending clinical status    Pavan Britt DO  11:11 AM

## 2025-01-11 NOTE — ED NOTES
Son, Caleb called and was updated on patient admission and states he will try to make in to see patient tomorrow because the roads are too bad tonight.

## 2025-01-12 ENCOUNTER — APPOINTMENT (OUTPATIENT)
Dept: CT IMAGING | Age: 85
DRG: 853 | End: 2025-01-12
Payer: MEDICARE

## 2025-01-12 LAB
ALBUMIN SERPL-MCNC: 2.3 G/DL (ref 3.5–5.2)
ALP SERPL-CCNC: 632 U/L (ref 35–104)
ALT SERPL-CCNC: 26 U/L (ref 0–32)
ANION GAP SERPL CALCULATED.3IONS-SCNC: 10 MMOL/L (ref 7–16)
ANION GAP SERPL CALCULATED.3IONS-SCNC: 10 MMOL/L (ref 7–16)
ANION GAP SERPL CALCULATED.3IONS-SCNC: 9 MMOL/L (ref 7–16)
AST SERPL-CCNC: 47 U/L (ref 0–31)
BASOPHILS # BLD: 0.01 K/UL (ref 0–0.2)
BASOPHILS NFR BLD: 0 % (ref 0–2)
BILIRUB SERPL-MCNC: 0.4 MG/DL (ref 0–1.2)
BUN SERPL-MCNC: 23 MG/DL (ref 6–23)
BUN SERPL-MCNC: 25 MG/DL (ref 6–23)
BUN SERPL-MCNC: 37 MG/DL (ref 6–23)
CALCIUM SERPL-MCNC: 8.3 MG/DL (ref 8.6–10.2)
CALCIUM SERPL-MCNC: 8.4 MG/DL (ref 8.6–10.2)
CALCIUM SERPL-MCNC: 8.4 MG/DL (ref 8.6–10.2)
CHLORIDE SERPL-SCNC: 95 MMOL/L (ref 98–107)
CHLORIDE SERPL-SCNC: 96 MMOL/L (ref 98–107)
CHLORIDE SERPL-SCNC: 96 MMOL/L (ref 98–107)
CO2 SERPL-SCNC: 21 MMOL/L (ref 22–29)
CO2 SERPL-SCNC: 22 MMOL/L (ref 22–29)
CO2 SERPL-SCNC: 22 MMOL/L (ref 22–29)
CREAT SERPL-MCNC: 0.9 MG/DL (ref 0.5–1)
CREAT SERPL-MCNC: 1.1 MG/DL (ref 0.5–1)
CREAT SERPL-MCNC: 1.4 MG/DL (ref 0.5–1)
CRP SERPL HS-MCNC: 150 MG/L (ref 0–5)
EKG ATRIAL RATE: 65 BPM
EKG P AXIS: 31 DEGREES
EKG P-R INTERVAL: 176 MS
EKG Q-T INTERVAL: 406 MS
EKG QRS DURATION: 94 MS
EKG QTC CALCULATION (BAZETT): 422 MS
EKG R AXIS: -10 DEGREES
EKG T AXIS: 11 DEGREES
EKG VENTRICULAR RATE: 65 BPM
EOSINOPHIL # BLD: 0.07 K/UL (ref 0.05–0.5)
EOSINOPHILS RELATIVE PERCENT: 1 % (ref 0–6)
ERYTHROCYTE [DISTWIDTH] IN BLOOD BY AUTOMATED COUNT: 13.6 % (ref 11.5–15)
ERYTHROCYTE [SEDIMENTATION RATE] IN BLOOD BY WESTERGREN METHOD: 79 MM/HR (ref 0–20)
GFR, ESTIMATED: 37 ML/MIN/1.73M2
GFR, ESTIMATED: 52 ML/MIN/1.73M2
GFR, ESTIMATED: 63 ML/MIN/1.73M2
GLUCOSE SERPL-MCNC: 113 MG/DL (ref 74–99)
GLUCOSE SERPL-MCNC: 114 MG/DL (ref 74–99)
GLUCOSE SERPL-MCNC: 148 MG/DL (ref 74–99)
HCT VFR BLD AUTO: 25.6 % (ref 34–48)
HGB BLD-MCNC: 8.4 G/DL (ref 11.5–15.5)
IMM GRANULOCYTES # BLD AUTO: 0.06 K/UL (ref 0–0.58)
IMM GRANULOCYTES NFR BLD: 1 % (ref 0–5)
LYMPHOCYTES NFR BLD: 0.66 K/UL (ref 1.5–4)
LYMPHOCYTES RELATIVE PERCENT: 8 % (ref 20–42)
MCH RBC QN AUTO: 27.3 PG (ref 26–35)
MCHC RBC AUTO-ENTMCNC: 32.8 G/DL (ref 32–34.5)
MCV RBC AUTO: 83.1 FL (ref 80–99.9)
MONOCYTES NFR BLD: 0.35 K/UL (ref 0.1–0.95)
MONOCYTES NFR BLD: 4 % (ref 2–12)
NEUTROPHILS NFR BLD: 86 % (ref 43–80)
NEUTS SEG NFR BLD: 6.81 K/UL (ref 1.8–7.3)
PLATELET CONFIRMATION: NORMAL
PLATELET, FLUORESCENCE: 94 K/UL (ref 130–450)
PMV BLD AUTO: 11.1 FL (ref 7–12)
POTASSIUM SERPL-SCNC: 3.5 MMOL/L (ref 3.5–5)
POTASSIUM SERPL-SCNC: 4.1 MMOL/L (ref 3.5–5)
POTASSIUM SERPL-SCNC: 4.3 MMOL/L (ref 3.5–5)
PROT SERPL-MCNC: 5.6 G/DL (ref 6.4–8.3)
RBC # BLD AUTO: 3.08 M/UL (ref 3.5–5.5)
SODIUM SERPL-SCNC: 126 MMOL/L (ref 132–146)
SODIUM SERPL-SCNC: 127 MMOL/L (ref 132–146)
SODIUM SERPL-SCNC: 128 MMOL/L (ref 132–146)
WBC OTHER # BLD: 8 K/UL (ref 4.5–11.5)

## 2025-01-12 PROCEDURE — 6360000002 HC RX W HCPCS: Performed by: INTERNAL MEDICINE

## 2025-01-12 PROCEDURE — 6360000002 HC RX W HCPCS: Performed by: SURGERY

## 2025-01-12 PROCEDURE — 1200000000 HC SEMI PRIVATE

## 2025-01-12 PROCEDURE — 85025 COMPLETE CBC W/AUTO DIFF WBC: CPT

## 2025-01-12 PROCEDURE — 2500000003 HC RX 250 WO HCPCS: Performed by: SPECIALIST

## 2025-01-12 PROCEDURE — 6370000000 HC RX 637 (ALT 250 FOR IP): Performed by: INTERNAL MEDICINE

## 2025-01-12 PROCEDURE — 85652 RBC SED RATE AUTOMATED: CPT

## 2025-01-12 PROCEDURE — 6360000002 HC RX W HCPCS: Performed by: SPECIALIST

## 2025-01-12 PROCEDURE — 36415 COLL VENOUS BLD VENIPUNCTURE: CPT

## 2025-01-12 PROCEDURE — 87040 BLOOD CULTURE FOR BACTERIA: CPT

## 2025-01-12 PROCEDURE — 6360000004 HC RX CONTRAST MEDICATION: Performed by: RADIOLOGY

## 2025-01-12 PROCEDURE — 80048 BASIC METABOLIC PNL TOTAL CA: CPT

## 2025-01-12 PROCEDURE — 74176 CT ABD & PELVIS W/O CONTRAST: CPT

## 2025-01-12 PROCEDURE — 2580000003 HC RX 258: Performed by: INTERNAL MEDICINE

## 2025-01-12 PROCEDURE — 93010 ELECTROCARDIOGRAM REPORT: CPT | Performed by: INTERNAL MEDICINE

## 2025-01-12 PROCEDURE — 99232 SBSQ HOSP IP/OBS MODERATE 35: CPT | Performed by: SURGERY

## 2025-01-12 PROCEDURE — 80053 COMPREHEN METABOLIC PANEL: CPT

## 2025-01-12 PROCEDURE — 2500000003 HC RX 250 WO HCPCS: Performed by: INTERNAL MEDICINE

## 2025-01-12 PROCEDURE — 99232 SBSQ HOSP IP/OBS MODERATE 35: CPT | Performed by: INTERNAL MEDICINE

## 2025-01-12 PROCEDURE — 86140 C-REACTIVE PROTEIN: CPT

## 2025-01-12 RX ORDER — IOPAMIDOL 755 MG/ML
18 INJECTION, SOLUTION INTRAVASCULAR
Status: COMPLETED | OUTPATIENT
Start: 2025-01-12 | End: 2025-01-12

## 2025-01-12 RX ADMIN — METOPROLOL SUCCINATE 50 MG: 50 TABLET, EXTENDED RELEASE ORAL at 20:15

## 2025-01-12 RX ADMIN — SODIUM CHLORIDE, PRESERVATIVE FREE 10 ML: 5 INJECTION INTRAVENOUS at 08:56

## 2025-01-12 RX ADMIN — Medication 1 G: at 12:29

## 2025-01-12 RX ADMIN — Medication 1 G: at 17:24

## 2025-01-12 RX ADMIN — IOPAMIDOL 18 ML: 755 INJECTION, SOLUTION INTRAVENOUS at 16:48

## 2025-01-12 RX ADMIN — DEXTROSE AND SODIUM CHLORIDE: 5; 900 INJECTION, SOLUTION INTRAVENOUS at 03:53

## 2025-01-12 RX ADMIN — ACETAMINOPHEN 650 MG: 325 TABLET ORAL at 00:52

## 2025-01-12 RX ADMIN — METOPROLOL SUCCINATE 50 MG: 50 TABLET, EXTENDED RELEASE ORAL at 08:56

## 2025-01-12 RX ADMIN — Medication 1 G: at 08:56

## 2025-01-12 RX ADMIN — GABAPENTIN 300 MG: 300 CAPSULE ORAL at 08:56

## 2025-01-12 RX ADMIN — METRONIDAZOLE 500 MG: 500 INJECTION, SOLUTION INTRAVENOUS at 23:00

## 2025-01-12 RX ADMIN — METRONIDAZOLE 500 MG: 500 INJECTION, SOLUTION INTRAVENOUS at 07:03

## 2025-01-12 RX ADMIN — DEXTROSE AND SODIUM CHLORIDE: 5; 900 INJECTION, SOLUTION INTRAVENOUS at 18:21

## 2025-01-12 RX ADMIN — ENOXAPARIN SODIUM 30 MG: 100 INJECTION SUBCUTANEOUS at 08:55

## 2025-01-12 RX ADMIN — ATORVASTATIN CALCIUM 10 MG: 10 TABLET, FILM COATED ORAL at 08:56

## 2025-01-12 RX ADMIN — WATER 2000 MG: 1 INJECTION INTRAMUSCULAR; INTRAVENOUS; SUBCUTANEOUS at 12:29

## 2025-01-12 RX ADMIN — METRONIDAZOLE 500 MG: 500 INJECTION, SOLUTION INTRAVENOUS at 14:47

## 2025-01-12 ASSESSMENT — PAIN SCALES - GENERAL: PAINLEVEL_OUTOF10: 0

## 2025-01-12 NOTE — PROGRESS NOTES
[facility]   Progress Note    Admitting Date and Time: 1/10/2025 11:10 AM  Admit Dx: Abdominal pain, right upper quadrant [R10.11]  Hypochloremia [E87.8]  Cholecystitis [K81.9]  Hyponatremia [E87.1]  PERCY (acute kidney injury) (HCC) [N17.9]    Subjective:    Patient was admitted with Abdominal pain, right upper quadrant [R10.11]  Hypochloremia [E87.8]  Cholecystitis [K81.9]  Hyponatremia [E87.1]  PERCY (acute kidney injury) (HCC) [N17.9]. Patient denies fever, chills, cp, sob, n/v.     sodium chloride  1 g Oral TID WC    cefTRIAXone (ROCEPHIN) IV  1,000 mg IntraVENous Q24H    metroNIDAZOLE  500 mg IntraVENous Q8H    gabapentin  300 mg Oral QAM    metoprolol succinate  50 mg Oral BID    atorvastatin  10 mg Oral Daily    sodium chloride flush  5-40 mL IntraVENous 2 times per day    enoxaparin  30 mg SubCUTAneous Daily     HYDROcodone-acetaminophen, 1 tablet, Daily PRN  sodium chloride flush, 5-40 mL, PRN  sodium chloride, , PRN  ondansetron, 4 mg, Q8H PRN   Or  ondansetron, 4 mg, Q6H PRN  polyethylene glycol, 17 g, Daily PRN  acetaminophen, 650 mg, Q6H PRN   Or  acetaminophen, 650 mg, Q6H PRN         Objective:    BP (!) 108/43   Pulse 56   Temp 98.3 °F (36.8 °C) (Oral)   Resp 18   Ht 1.549 m (5' 1\")   Wt 71.8 kg (158 lb 3.2 oz)   SpO2 98%   BMI 29.89 kg/m²   Skin: warm and dry, no rash or erythema  Pulmonary/Chest: clear to auscultation bilaterally- no wheezes, rales or rhonchi, normal air movement, no respiratory distress  Cardiovascular: rhythm reg at rate of 58  Abdomen: soft, non-tender, non-distended, normal bowel sounds, no masses or organomegaly  Extremities: no cyanosis, no clubbing, and no edema      Recent Labs     01/11/25  0557 01/11/25  1840 01/12/25  0345   * 124* 128*   K 4.2 4.1 4.3   CL 88* 95* 96*   CO2 19* 22 22   BUN 47* 42* 37*   CREATININE 2.2* 1.6* 1.4*   GLUCOSE 54* 120* 148*   CALCIUM 8.7 8.3* 8.4*       Recent Labs     01/10/25  1133 01/11/25  0557   Thrombocytopenia (HCC)    Controlled type 2 diabetes mellitus without complication (HCC)  Resolved Problems:    * No resolved hospital problems. *      Plan:  Chuy continue iv fluids. renal following   Acidosis monitor  Hyponatremia monitor closely on fluids  Htn continue med  Dm type 2 controlled monitor bs and tx with insulin  Thrombocytopenia monitor  Cholelithiasis surgery following  Htn monitor bp and tx  Hyperlipidemia continue med  Blood cultures pos will ask ID to see.    Encourage activity  Monitor temp  Monitor labs    Electronically signed by Shai Ordonez DO on 1/12/2025 at 11:38 AM

## 2025-01-12 NOTE — PLAN OF CARE
Problem: ABCDS Injury Assessment  Goal: Absence of physical injury  1/12/2025 0954 by Kenneth Acevedo RN  Outcome: Progressing  1/12/2025 0242 by Lakeisha Harding RN  Outcome: Progressing     Problem: Discharge Planning  Goal: Discharge to home or other facility with appropriate resources  Outcome: Progressing     Problem: Chronic Conditions and Co-morbidities  Goal: Patient's chronic conditions and co-morbidity symptoms are monitored and maintained or improved  1/12/2025 0954 by Kenneth Acevedo RN  Outcome: Progressing  1/12/2025 0242 by Lakeisha Harding RN  Outcome: Progressing     Problem: Safety - Adult  Goal: Free from fall injury  1/12/2025 0954 by Kenneth Acevedo RN  Outcome: Progressing  1/12/2025 0242 by Lakeisha Harding RN  Outcome: Progressing     Problem: Pain  Goal: Verbalizes/displays adequate comfort level or baseline comfort level  Outcome: Progressing

## 2025-01-12 NOTE — CONSULTS
plastic  surgery--gets ct every  3 months----CLYDE lung nodule--following    Covid  2-141-24    Melanoma chest wal lwith mets  elicia and lung   9-24  refer to  onc in town and PT----------------------dr mirna Miller    Gallbladder stones seen on x-rays with Dr. Wilson and CAT scan from [facility] July 2024--order  us   10-24-----refer to dr viera     Social Determinants of Health     Financial Resource Strain: Low Risk  (10/7/2024)    Overall Financial Resource Strain (CARDIA)     Difficulty of Paying Living Expenses: Not hard at all   Food Insecurity: No Food Insecurity (1/10/2025)    Hunger Vital Sign     Worried About Running Out of Food in the Last Year: Never true     Ran Out of Food in the Last Year: Never true   Transportation Needs: No Transportation Needs (1/10/2025)    PRAPARE - Transportation     Lack of Transportation (Medical): No     Lack of Transportation (Non-Medical): No   Physical Activity: Inactive (2/27/2024)    Exercise Vital Sign     Days of Exercise per Week: 0 days     Minutes of Exercise per Session: 0 min   Housing Stability: Low Risk  (1/10/2025)    Housing Stability Vital Sign     Unable to Pay for Housing in the Last Year: No     Number of Times Moved in the Last Year: 0     Homeless in the Last Year: No      Pets: None  Travel: Not recently  The patient lives at home.    Family History:   No family history on file.. Otherwise non-pertinent to the chief complaint.    REVIEW OF SYSTEMS:    Constitutional: Negative for fevers, chills, diaphoresis  Neurologic: Sunseri that she was lethargic at home  Psychiatric: Negative  Rheumatologic: Negative   Endocrine: Negative  Hematologic: Negative  Immunologic: Negative  ENT: Negative  Respiratory: Negative   Cardiovascular: Negative  GI: As in the HPI  : Negative  Musculoskeletal: Negative  Skin: No rashes.     PHYSICAL EXAM:    Vitals:   BP (!) 108/43   Pulse 56   Temp 98.3 °F (36.8 °C) (Oral)   Resp 18   Ht 1.549 m (5' 1\")   Wt  71.8 kg (158 lb 3.2 oz)   SpO2 98%   BMI 29.89 kg/m²   Constitutional: The patient is awake, alert, and oriented.  She is in no distress but has some slow mentation.  Son in room.  Skin: Warm and dry. No rashes were noted.   HEENT: Eyes show round, and reactive pupils. No jaundice. Moist mucous membranes, no ulcerations, no thrush.   Neck: Supple to movements. No lymphadenopathy.   Chest: No use of accessory muscles to breathe. Symmetrical expansion. Auscultation reveals no wheezing, crackles, or rhonchi.   Cardiovascular: S1 and S2 are rhythmic and regular. No murmurs appreciated.   Abdomen: Positive bowel sounds to auscultation.  Tender right upper quadrant.  Extremities: No clubbing, no cyanosis, no edema.  Lines: Peripheral.    No results found for: \"CRP\"   No results found for: \"CRPHS\"  No results found for: \"SEDRATE\"    Radiology:  Review    Microbiology:  Blood cultures 1/10/2024: Serratia marcescens in 4 of 4 bottles      Assessment:  Status post ERCP 1/3/2025  Cholecystitis with probable cholangitis  Serratia marcescens septicemia associated to the above  Leukocytosis associated to the above  Hypotension associated to the above    Plan:    Continue Ceftriaxone.  Increase dose to 2 g IV once daily.  Continue Metronidazole for now  Check cultures, baseline ESR, CRP  Possible ERCP this week  T of the abdomen and pelvis  Will follow with you    Thank you for having us see this patient in consultation. I will be discussing this case with the treating physicians or communicated through the electronic health record.  Spoke with nursing.  Spoke with son.    Zeke Hobson MD  9:12 AM  1/12/2025

## 2025-01-12 NOTE — PLAN OF CARE
Problem: ABCDS Injury Assessment  Goal: Absence of physical injury  Outcome: Progressing     Problem: Chronic Conditions and Co-morbidities  Goal: Patient's chronic conditions and co-morbidity symptoms are monitored and maintained or improved  Outcome: Progressing     Problem: Safety - Adult  Goal: Free from fall injury  Outcome: Progressing

## 2025-01-12 NOTE — PROGRESS NOTES
Department of Internal Medicine  Nephrology Attending progress Note      Reason for Consult: Hyponatremia  Requesting Physician:  Jose E Squires MD    CHIEF COMPLAINT: Abdominal pain and weakness. Patient is somewhat drowsy         Past Medical History:        Diagnosis Date    Atypical mole 3/16/2021    Chronic obstructive lung disease (HCC)     Diet-controlled diabetes mellitus (HCC) 9/12/2019    DJD (degenerative joint disease)     multiple joints    Heart disease     benign hypertensive heart disease without congestive heart failure    Hyperlipidemia     Hypertension     Hypothyroidism     Type 2 diabetes mellitus without complication (HCC)      Past Surgical History:        Procedure Laterality Date    ANKLE SURGERY Right     ERCP N/A 1/3/2025    ENDOSCOPIC RETROGRADE CHOLANGIOPANCREATOGRAPHY STONE REMOVAL performed by Herson Emmanuel MD at [facility] ENDOSCOPY    ERCP N/A 1/3/2025    ENDOSCOPIC RETROGRADE CHOLANGIOPANCREATOGRAPHY STENT INSERTION performed by Herson Emmanuel MD at [facility] ENDOSCOPY    SHOULDER SURGERY Left      Current Medications:    Current Facility-Administered Medications: cefTRIAXone (ROCEPHIN) 2,000 mg in sterile water 20 mL IV syringe, 2,000 mg, IntraVENous, Q24H  sodium chloride tablet 1 g, 1 g, Oral, TID WC  metroNIDAZOLE (FLAGYL) 500 mg in 0.9% NaCl 100 mL IVPB premix, 500 mg, IntraVENous, Q8H  dextrose 5 % and 0.9 % sodium chloride infusion, , IntraVENous, Continuous  gabapentin (NEURONTIN) capsule 300 mg, 300 mg, Oral, QAM  HYDROcodone-acetaminophen (NORCO) 7.5-325 MG per tablet 1 tablet, 1 tablet, Oral, Daily PRN  metoprolol succinate (TOPROL XL) extended release tablet 50 mg, 50 mg, Oral, BID  atorvastatin (LIPITOR) tablet 10 mg, 10 mg, Oral, Daily  sodium chloride flush 0.9 % injection 5-40 mL, 5-40 mL, IntraVENous, 2 times per day  sodium chloride flush 0.9 % injection 5-40 mL, 5-40 mL, IntraVENous, PRN  0.9 % sodium chloride infusion, , IntraVENous, PRN  enoxaparin

## 2025-01-13 LAB
ACB COMPLEX DNA BLD POS QL NAA+NON-PROBE: NOT DETECTED
ALBUMIN SERPL-MCNC: 2.2 G/DL (ref 3.5–5.2)
ALP SERPL-CCNC: 397 U/L (ref 35–104)
ALT SERPL-CCNC: 20 U/L (ref 0–32)
ANION GAP SERPL CALCULATED.3IONS-SCNC: 8 MMOL/L (ref 7–16)
ANION GAP SERPL CALCULATED.3IONS-SCNC: 9 MMOL/L (ref 7–16)
AST SERPL-CCNC: 22 U/L (ref 0–31)
B FRAGILIS DNA BLD POS QL NAA+NON-PROBE: NOT DETECTED
BASOPHILS # BLD: 0.02 K/UL (ref 0–0.2)
BASOPHILS NFR BLD: 0 % (ref 0–2)
BILIRUB SERPL-MCNC: 0.3 MG/DL (ref 0–1.2)
BIOFIRE TEST COMMENT: ABNORMAL
BLACTX-M ISLT/SPM QL: NOT DETECTED
BLAIMP ISLT/SPM QL: NOT DETECTED
BLAKPC ISLT/SPM QL: NOT DETECTED
BLAOXA-48-LIKE ISLT/SPM QL: NOT DETECTED
BLAVIM ISLT/SPM QL: NOT DETECTED
BUN SERPL-MCNC: 14 MG/DL (ref 6–23)
BUN SERPL-MCNC: 19 MG/DL (ref 6–23)
C ALBICANS DNA BLD POS QL NAA+NON-PROBE: NOT DETECTED
C AURIS DNA BLD POS QL NAA+NON-PROBE: NOT DETECTED
C GATTII+NEOFOR DNA BLD POS QL NAA+N-PRB: NOT DETECTED
C GLABRATA DNA BLD POS QL NAA+NON-PROBE: NOT DETECTED
C KRUSEI DNA BLD POS QL NAA+NON-PROBE: NOT DETECTED
C PARAP DNA BLD POS QL NAA+NON-PROBE: NOT DETECTED
C TROPICLS DNA BLD POS QL NAA+NON-PROBE: NOT DETECTED
CALCIUM SERPL-MCNC: 7.9 MG/DL (ref 8.6–10.2)
CALCIUM SERPL-MCNC: 8.3 MG/DL (ref 8.6–10.2)
CHLORIDE SERPL-SCNC: 95 MMOL/L (ref 98–107)
CHLORIDE SERPL-SCNC: 99 MMOL/L (ref 98–107)
CHLORIDE UR-SCNC: 94 MMOL/L
CO2 SERPL-SCNC: 23 MMOL/L (ref 22–29)
CO2 SERPL-SCNC: 23 MMOL/L (ref 22–29)
CREAT SERPL-MCNC: 0.8 MG/DL (ref 0.5–1)
CREAT SERPL-MCNC: 0.9 MG/DL (ref 0.5–1)
E CLOAC COMP DNA BLD POS NAA+NON-PROBE: NOT DETECTED
E COLI DNA BLD POS QL NAA+NON-PROBE: NOT DETECTED
E FAECALIS DNA BLD POS QL NAA+NON-PROBE: NOT DETECTED
E FAECIUM DNA BLD POS QL NAA+NON-PROBE: NOT DETECTED
ENTEROBACTERALES DNA BLD POS NAA+N-PRB: DETECTED
EOSINOPHIL # BLD: 0.17 K/UL (ref 0.05–0.5)
EOSINOPHILS RELATIVE PERCENT: 2 % (ref 0–6)
ERYTHROCYTE [DISTWIDTH] IN BLOOD BY AUTOMATED COUNT: 13.6 % (ref 11.5–15)
GFR, ESTIMATED: 67 ML/MIN/1.73M2
GFR, ESTIMATED: 77 ML/MIN/1.73M2
GLUCOSE SERPL-MCNC: 120 MG/DL (ref 74–99)
GLUCOSE SERPL-MCNC: 127 MG/DL (ref 74–99)
GP B STREP DNA BLD POS QL NAA+NON-PROBE: NOT DETECTED
HAEM INFLU DNA BLD POS QL NAA+NON-PROBE: NOT DETECTED
HCT VFR BLD AUTO: 27.4 % (ref 34–48)
HGB BLD-MCNC: 8.8 G/DL (ref 11.5–15.5)
IMM GRANULOCYTES # BLD AUTO: 0.12 K/UL (ref 0–0.58)
IMM GRANULOCYTES NFR BLD: 2 % (ref 0–5)
K OXYTOCA DNA BLD POS QL NAA+NON-PROBE: NOT DETECTED
KLEBSIELLA SP DNA BLD POS QL NAA+NON-PRB: NOT DETECTED
KLEBSIELLA SP DNA BLD POS QL NAA+NON-PRB: NOT DETECTED
L MONOCYTOG DNA BLD POS QL NAA+NON-PROBE: NOT DETECTED
LYMPHOCYTES NFR BLD: 0.95 K/UL (ref 1.5–4)
LYMPHOCYTES RELATIVE PERCENT: 12 % (ref 20–42)
MAGNESIUM SERPL-MCNC: 1.4 MG/DL (ref 1.6–2.6)
MCH RBC QN AUTO: 27.1 PG (ref 26–35)
MCHC RBC AUTO-ENTMCNC: 32.1 G/DL (ref 32–34.5)
MCV RBC AUTO: 84.3 FL (ref 80–99.9)
MICROORGANISM SPEC CULT: ABNORMAL
MICROORGANISM SPEC CULT: ABNORMAL
MICROORGANISM/AGENT SPEC: ABNORMAL
MICROORGANISM/AGENT SPEC: ABNORMAL
MONOCYTES NFR BLD: 0.47 K/UL (ref 0.1–0.95)
MONOCYTES NFR BLD: 6 % (ref 2–12)
N MEN DNA BLD POS QL NAA+NON-PROBE: NOT DETECTED
NEUTROPHILS NFR BLD: 78 % (ref 43–80)
NEUTS SEG NFR BLD: 6.25 K/UL (ref 1.8–7.3)
OSMOLALITY UR: 483 MOSM/KG (ref 300–900)
P AERUGINOSA DNA BLD POS NAA+NON-PROBE: NOT DETECTED
PLATELET, FLUORESCENCE: 103 K/UL (ref 130–450)
PMV BLD AUTO: 11 FL (ref 7–12)
POTASSIUM SERPL-SCNC: 3.3 MMOL/L (ref 3.5–5)
POTASSIUM SERPL-SCNC: 3.5 MMOL/L (ref 3.5–5)
POTASSIUM, UR: 26.9 MMOL/L
PROT SERPL-MCNC: 5.5 G/DL (ref 6.4–8.3)
PROTEUS SP DNA BLD POS QL NAA+NON-PROBE: NOT DETECTED
RBC # BLD AUTO: 3.25 M/UL (ref 3.5–5.5)
RESISTANT GENE NDM BY PCR: NOT DETECTED
S AUREUS DNA BLD POS QL NAA+NON-PROBE: NOT DETECTED
S AUREUS+CONS DNA BLD POS NAA+NON-PROBE: NOT DETECTED
S EPIDERMIDIS DNA BLD POS QL NAA+NON-PRB: NOT DETECTED
S LUGDUNENSIS DNA BLD POS QL NAA+NON-PRB: NOT DETECTED
S MALTOPHILIA DNA BLD POS QL NAA+NON-PRB: NOT DETECTED
S MARCESCENS DNA BLD POS NAA+NON-PROBE: DETECTED
S PNEUM DNA BLD POS QL NAA+NON-PROBE: NOT DETECTED
S PYO DNA BLD POS QL NAA+NON-PROBE: NOT DETECTED
SALMONELLA DNA BLD POS QL NAA+NON-PROBE: NOT DETECTED
SERVICE CMNT-IMP: ABNORMAL
SERVICE CMNT-IMP: ABNORMAL
SODIUM SERPL-SCNC: 127 MMOL/L (ref 132–146)
SODIUM SERPL-SCNC: 130 MMOL/L (ref 132–146)
SODIUM UR-SCNC: 58 MMOL/L
SPECIMEN DESCRIPTION: ABNORMAL
SPECIMEN DESCRIPTION: ABNORMAL
STREPTOCOCCUS DNA BLD POS NAA+NON-PROBE: NOT DETECTED
WBC OTHER # BLD: 8 K/UL (ref 4.5–11.5)

## 2025-01-13 PROCEDURE — 6360000002 HC RX W HCPCS: Performed by: SPECIALIST

## 2025-01-13 PROCEDURE — 1200000000 HC SEMI PRIVATE

## 2025-01-13 PROCEDURE — 36415 COLL VENOUS BLD VENIPUNCTURE: CPT

## 2025-01-13 PROCEDURE — 84133 ASSAY OF URINE POTASSIUM: CPT

## 2025-01-13 PROCEDURE — 6360000002 HC RX W HCPCS: Performed by: SURGERY

## 2025-01-13 PROCEDURE — 2580000003 HC RX 258: Performed by: INTERNAL MEDICINE

## 2025-01-13 PROCEDURE — 83735 ASSAY OF MAGNESIUM: CPT

## 2025-01-13 PROCEDURE — 2500000003 HC RX 250 WO HCPCS: Performed by: INTERNAL MEDICINE

## 2025-01-13 PROCEDURE — 87081 CULTURE SCREEN ONLY: CPT

## 2025-01-13 PROCEDURE — 6370000000 HC RX 637 (ALT 250 FOR IP): Performed by: INTERNAL MEDICINE

## 2025-01-13 PROCEDURE — 83935 ASSAY OF URINE OSMOLALITY: CPT

## 2025-01-13 PROCEDURE — 99232 SBSQ HOSP IP/OBS MODERATE 35: CPT | Performed by: INTERNAL MEDICINE

## 2025-01-13 PROCEDURE — 99232 SBSQ HOSP IP/OBS MODERATE 35: CPT | Performed by: SURGERY

## 2025-01-13 PROCEDURE — 2500000003 HC RX 250 WO HCPCS: Performed by: SPECIALIST

## 2025-01-13 PROCEDURE — 82436 ASSAY OF URINE CHLORIDE: CPT

## 2025-01-13 PROCEDURE — 6360000002 HC RX W HCPCS

## 2025-01-13 PROCEDURE — 85025 COMPLETE CBC W/AUTO DIFF WBC: CPT

## 2025-01-13 PROCEDURE — 80053 COMPREHEN METABOLIC PANEL: CPT

## 2025-01-13 PROCEDURE — 80048 BASIC METABOLIC PNL TOTAL CA: CPT

## 2025-01-13 PROCEDURE — 84300 ASSAY OF URINE SODIUM: CPT

## 2025-01-13 RX ORDER — POTASSIUM CHLORIDE 1500 MG/1
20 TABLET, EXTENDED RELEASE ORAL ONCE
Status: COMPLETED | OUTPATIENT
Start: 2025-01-13 | End: 2025-01-13

## 2025-01-13 RX ORDER — ENOXAPARIN SODIUM 100 MG/ML
40 INJECTION SUBCUTANEOUS DAILY
Status: DISCONTINUED | OUTPATIENT
Start: 2025-01-13 | End: 2025-01-18 | Stop reason: HOSPADM

## 2025-01-13 RX ORDER — MAGNESIUM SULFATE IN WATER 40 MG/ML
2000 INJECTION, SOLUTION INTRAVENOUS ONCE
Status: COMPLETED | OUTPATIENT
Start: 2025-01-13 | End: 2025-01-13

## 2025-01-13 RX ADMIN — METRONIDAZOLE 500 MG: 500 INJECTION, SOLUTION INTRAVENOUS at 06:36

## 2025-01-13 RX ADMIN — MAGNESIUM SULFATE HEPTAHYDRATE 2000 MG: 40 INJECTION, SOLUTION INTRAVENOUS at 08:22

## 2025-01-13 RX ADMIN — DEXTROSE AND SODIUM CHLORIDE: 5; 900 INJECTION, SOLUTION INTRAVENOUS at 19:54

## 2025-01-13 RX ADMIN — POTASSIUM CHLORIDE 20 MEQ: 1500 TABLET, EXTENDED RELEASE ORAL at 17:50

## 2025-01-13 RX ADMIN — HYDROCODONE BITARTRATE AND ACETAMINOPHEN 1 TABLET: 7.5; 325 TABLET ORAL at 11:47

## 2025-01-13 RX ADMIN — SODIUM CHLORIDE, PRESERVATIVE FREE 10 ML: 5 INJECTION INTRAVENOUS at 19:57

## 2025-01-13 RX ADMIN — SODIUM CHLORIDE, PRESERVATIVE FREE 10 ML: 5 INJECTION INTRAVENOUS at 08:11

## 2025-01-13 RX ADMIN — METRONIDAZOLE 500 MG: 500 INJECTION, SOLUTION INTRAVENOUS at 15:26

## 2025-01-13 RX ADMIN — METRONIDAZOLE 500 MG: 500 INJECTION, SOLUTION INTRAVENOUS at 22:39

## 2025-01-13 RX ADMIN — WATER 2000 MG: 1 INJECTION INTRAMUSCULAR; INTRAVENOUS; SUBCUTANEOUS at 13:53

## 2025-01-13 RX ADMIN — DEXTROSE AND SODIUM CHLORIDE: 5; 900 INJECTION, SOLUTION INTRAVENOUS at 06:29

## 2025-01-13 ASSESSMENT — PAIN SCALES - GENERAL
PAINLEVEL_OUTOF10: 8
PAINLEVEL_OUTOF10: 0

## 2025-01-13 ASSESSMENT — PAIN DESCRIPTION - DESCRIPTORS: DESCRIPTORS: ACHING;SHARP

## 2025-01-13 ASSESSMENT — PAIN DESCRIPTION - ORIENTATION: ORIENTATION: RIGHT;LEFT

## 2025-01-13 ASSESSMENT — PAIN DESCRIPTION - LOCATION: LOCATION: LEG

## 2025-01-13 NOTE — PATIENT CARE CONFERENCE
[facility] Quality Flow/Interdisciplinary Rounds Progress Note        Quality Flow Rounds held on January 13, 2025    Disciplines Attending:  Bedside Nurse, , , and Nursing Unit Leadership    Colleen Qiu was admitted on 1/10/2025 11:10 AM    Anticipated Discharge Date:       Disposition:    Aneesh Score:  Aneesh Scale Score: 20    [facility] RISK OF UNPLANNED READMISSION 2.0             13.4 Total Score        Discussed patient goal for the day, patient clinical progression, and barriers to discharge.  The following Goal(s) of the Day/Commitment(s) have been identified:  Diagnostics - Report Results and Labs - Report Results      Leslie Mckenzie RN  January 13, 2025

## 2025-01-13 NOTE — PROGRESS NOTES
GENERAL SURGERY  DAILY PROGRESS NOTE  1/13/2025    No chief complaint on file.      Subjective:  Denies any abdominal pain this AM however did express pain on exam.     Objective:  BP (!) 120/59   Pulse 74   Temp 98.1 °F (36.7 °C) (Oral)   Resp 18   Ht 1.549 m (5' 1\")   Wt 73.4 kg (161 lb 12.8 oz)   SpO2 98%   BMI 30.57 kg/m²     GENERAL:  Laying in bed, awake, alert, cooperative, no apparent distress  HEAD: No gross abnormalities   LUNGS:  No increased work of breathing  CARDIOVASCULAR:  RR  ABDOMEN:  Soft, RUQ TTP   SKIN: Warm and dry    Na 127   WBC 8   HIDA consistent with acute cholecystitis     Assessment/Plan:  84 y.o. female with RUQ pain 2/2 acute cholecystitis     Na still < 130. Appreciate nephro recs   Will need cholecystectomy once sodium improved  Continue Abx per ID, bx w serratia     Electronically signed by Dona Salamanca MD on 1/13/2025 at 7:05 AM          Attending Physician Statement:    Chief Complaint: No chief complaint on file.      I have examined the patient and performed the key aspects of physical exam, reviewed the record (including all new notes, pertinent radiology images which are independently reviewed and laboratory findings), and discussed the case with the surgical team.  I agree with the assessment and plan with the following additions, corrections, and changes. 14pt review of symptoms completed and negative except as mentioned.    Hyponatremia persists.  Cholecystectomy on hold until sodium improved.  HIDA scan results could be occluded based because of the stent.  However there is filling of the small bowel showing that the stent is patent.    Kiet Chambers MD  01/13/25  1:33 PM    NOTE: This report, in part or full, may have been transcribed using voice recognition software. Every effort was made to ensure accuracy; however, inadvertent computerized transcription errors may be present. Please excuse any transcriptional grammatical or spelling errors that may have escaped my

## 2025-01-13 NOTE — PROGRESS NOTES
Department of Internal Medicine  Nephrology Attending Progress Note      Events reviewed.     SUBJECTIVE: We are following Ms. Jozef Qiu for PERCY and hyponatremia. Patient reports no new complaints today.    PHYSICAL EXAM:      Vitals:    VITALS:  BP (!) 141/57   Pulse 82   Temp 98.2 °F (36.8 °C) (Oral)   Resp 18   Ht 1.549 m (5' 1\")   Wt 73.4 kg (161 lb 12.8 oz)   SpO2 94%   BMI 30.57 kg/m²   24HR INTAKE/OUTPUT:    Intake/Output Summary (Last 24 hours) at 1/13/2025 1243  Last data filed at 1/13/2025 0604  Gross per 24 hour   Intake 2485.39 ml   Output 1400 ml   Net 1085.39 ml       Constitutional: Patient is awake, alert in no distress  HEENT: Pupils are equal reactive  Respiratory: Lungs are clear  Cardiovascular/Edema: Heart sounds are regular  Gastrointestinal: Abdomen soft  Neurologic: Nonfocal  Skin: No lesions  Other: No edema      Scheduled Meds:   enoxaparin  40 mg SubCUTAneous Daily    cefTRIAXone (ROCEPHIN) IV  2,000 mg IntraVENous Q24H    sodium chloride  1 g Oral TID WC    metroNIDAZOLE  500 mg IntraVENous Q8H    gabapentin  300 mg Oral QAM    metoprolol succinate  50 mg Oral BID    atorvastatin  10 mg Oral Daily    sodium chloride flush  5-40 mL IntraVENous 2 times per day     Continuous Infusions:   dextrose 5 % and 0.9 % NaCl 100 mL/hr at 01/13/25 0629    sodium chloride Stopped (01/11/25 1325)     PRN Meds:.HYDROcodone-acetaminophen, sodium chloride flush, sodium chloride, ondansetron **OR** ondansetron, polyethylene glycol, acetaminophen **OR** acetaminophen      DATA:    CBC:   Lab Results   Component Value Date/Time    WBC 8.0 01/13/2025 03:57 AM    RBC 3.25 01/13/2025 03:57 AM    HGB 8.8 01/13/2025 03:57 AM    HCT 27.4 01/13/2025 03:57 AM    MCV 84.3 01/13/2025 03:57 AM    MCH 27.1 01/13/2025 03:57 AM    MCHC 32.1 01/13/2025 03:57 AM    RDW 13.6 01/13/2025 03:57 AM     01/11/2025 05:57 AM    MPV 11.0 01/13/2025 03:57 AM     CMP:    Lab Results   Component Value Date/Time     and possibly thiazide induced, in the setting of decreased solute intake and NSAIDs administration,urine specific gravity 1.020, urine sodium 25, urine osmolality 259. Sodium improving to 127 today, to continue IVF    HTN, on metoprolol  Normocytic anemia, to obtain iron studies, B12 and folate  -----------------------------------------------  Acute cholecystitis  Recent choledocholithiasis status post ERCP  Metastatic melanoma  Hyperlipidemia, on atorvastatin  Type II DM     Plan:     Continue D5 NS at 100 mL/hr  Stop sodium tablets  Repeat urine indices   Continue metoprolol succinate 50 mg twice daily  Continue to monitor renal function  Continue to monitor sodium level  Continue to monitor blood pressure     MARTÍN Nelson - CNP      I saw and evaluated the patient, performing the key elements of the service. I discussed the findings, assessment and plan with NP and agree with her findings and plans as documented in her note.        Adrianna Elmore MD

## 2025-01-13 NOTE — PROGRESS NOTES
[facility]  [facility]  Progress Note    SUBJECTIVE:  No chief complaint on file.    Patient is tolerating medications. No reported adverse drug reactions.  Lying in bed.  She is in no distress.  Has been afebrile for 24 hours    Review of systems:  As stated above in the chief complaint, otherwise negative.    Medications:  Scheduled Meds:   enoxaparin  40 mg SubCUTAneous Daily    cefTRIAXone (ROCEPHIN) IV  2,000 mg IntraVENous Q24H    sodium chloride  1 g Oral TID WC    metroNIDAZOLE  500 mg IntraVENous Q8H    gabapentin  300 mg Oral QAM    metoprolol succinate  50 mg Oral BID    atorvastatin  10 mg Oral Daily    sodium chloride flush  5-40 mL IntraVENous 2 times per day     Continuous Infusions:   dextrose 5 % and 0.9 % NaCl 100 mL/hr at 25 0629    sodium chloride Stopped (25 1325)     PRN Meds:HYDROcodone-acetaminophen, sodium chloride flush, sodium chloride, ondansetron **OR** ondansetron, polyethylene glycol, acetaminophen **OR** acetaminophen    OBJECTIVE:  BP (!) 141/57   Pulse 82   Temp 98.2 °F (36.8 °C) (Oral)   Resp 18   Ht 1.549 m (5' 1\")   Wt 73.4 kg (161 lb 12.8 oz)   SpO2 94%   BMI 30.57 kg/m²   Temp  Av.9 °F (36.6 °C)  Min: 97.5 °F (36.4 °C)  Max: 98.2 °F (36.8 °C)  Constitutional: The patient is lying in bed.  She is in no distress.  Awake and alert  Skin: Warm and dry. No rashes were noted.   HEENT: Round and reactive pupils.  Moist mucous membranes.  No ulcerations or thrush.  Neck: Supple to movements.   Chest: No use of accessory muscles to breathe. Symmetrical expansion.  No wheezing, crackles or rhonchi.  Cardiovascular: S1 and S2 are rhythmic and regular. No murmurs appreciated.   Abdomen: Positive bowel sounds to auscultation.  Tender to palpation RUQ. No masses felt.  Extremities: No edema.  Lines: Peripheral.    Laboratory and Tests:  Lab Results   Component Value Date    .0 (H) 2025     Lab Results   Component Value Date

## 2025-01-13 NOTE — PROGRESS NOTES
[facility]   Progress Note    Admitting Date and Time: 1/10/2025 11:10 AM  Admit Dx: Abdominal pain, right upper quadrant [R10.11]  Hypochloremia [E87.8]  Cholecystitis [K81.9]  Hyponatremia [E87.1]  PERCY (acute kidney injury) (HCC) [N17.9]    Subjective:    Patient was admitted with Abdominal pain, right upper quadrant [R10.11]  Hypochloremia [E87.8]  Cholecystitis [K81.9]  Hyponatremia [E87.1]  PERCY (acute kidney injury) (HCC) [N17.9]. Patient  denies fever, chills, cp, sob, n/v.     enoxaparin  40 mg SubCUTAneous Daily    potassium chloride  20 mEq Oral Once    cefTRIAXone (ROCEPHIN) IV  2,000 mg IntraVENous Q24H    metroNIDAZOLE  500 mg IntraVENous Q8H    gabapentin  300 mg Oral QAM    metoprolol succinate  50 mg Oral BID    atorvastatin  10 mg Oral Daily    sodium chloride flush  5-40 mL IntraVENous 2 times per day     HYDROcodone-acetaminophen, 1 tablet, Daily PRN  sodium chloride flush, 5-40 mL, PRN  sodium chloride, , PRN  ondansetron, 4 mg, Q8H PRN   Or  ondansetron, 4 mg, Q6H PRN  polyethylene glycol, 17 g, Daily PRN  acetaminophen, 650 mg, Q6H PRN   Or  acetaminophen, 650 mg, Q6H PRN         Objective:    BP (!) 141/57   Pulse 82   Temp 98.2 °F (36.8 °C) (Oral)   Resp 18   Ht 1.549 m (5' 1\")   Wt 73.4 kg (161 lb 12.8 oz)   SpO2 94%   BMI 30.57 kg/m²   Skin: warm and dry, no rash or erythema  Pulmonary/Chest: clear to auscultation bilaterally- no wheezes, rales or rhonchi, normal air movement, no respiratory distress  Cardiovascular: rhythm reg at rate of 84  Abdomen: soft, non-tender, non-distended, normal bowel sounds, no masses or organomegaly  Extremities: no cyanosis, no clubbing, and no edema      Recent Labs     01/12/25  2313 01/13/25  0357 01/13/25  1520   * 127* 130*   K 3.5 3.5 3.3*   CL 95* 95* 99   CO2 21* 23 23   BUN 23 19 14   CREATININE 0.9 0.9 0.8   GLUCOSE 114* 120* 127*   CALCIUM 8.4* 8.3* 7.9*       Recent Labs     01/11/25  0557 01/12/25  0345

## 2025-01-14 ENCOUNTER — APPOINTMENT (OUTPATIENT)
Dept: GENERAL RADIOLOGY | Age: 85
DRG: 853 | End: 2025-01-14
Payer: MEDICARE

## 2025-01-14 LAB
ALBUMIN SERPL-MCNC: 2.3 G/DL (ref 3.5–5.2)
ALP SERPL-CCNC: 343 U/L (ref 35–104)
ALT SERPL-CCNC: 15 U/L (ref 0–32)
ANION GAP SERPL CALCULATED.3IONS-SCNC: 9 MMOL/L (ref 7–16)
ANION GAP SERPL CALCULATED.3IONS-SCNC: 9 MMOL/L (ref 7–16)
AST SERPL-CCNC: 16 U/L (ref 0–31)
BASOPHILS # BLD: 0.02 K/UL (ref 0–0.2)
BASOPHILS NFR BLD: 0 % (ref 0–2)
BILIRUB SERPL-MCNC: 0.3 MG/DL (ref 0–1.2)
BUN SERPL-MCNC: 8 MG/DL (ref 6–23)
BUN SERPL-MCNC: 8 MG/DL (ref 6–23)
CALCIUM SERPL-MCNC: 8.1 MG/DL (ref 8.6–10.2)
CALCIUM SERPL-MCNC: 8.1 MG/DL (ref 8.6–10.2)
CHLORIDE SERPL-SCNC: 99 MMOL/L (ref 98–107)
CHLORIDE SERPL-SCNC: 99 MMOL/L (ref 98–107)
CO2 SERPL-SCNC: 23 MMOL/L (ref 22–29)
CO2 SERPL-SCNC: 24 MMOL/L (ref 22–29)
CREAT SERPL-MCNC: 0.7 MG/DL (ref 0.5–1)
CREAT SERPL-MCNC: 0.7 MG/DL (ref 0.5–1)
EOSINOPHIL # BLD: 0.09 K/UL (ref 0.05–0.5)
EOSINOPHILS RELATIVE PERCENT: 2 % (ref 0–6)
ERYTHROCYTE [DISTWIDTH] IN BLOOD BY AUTOMATED COUNT: 13.9 % (ref 11.5–15)
ERYTHROCYTE [DISTWIDTH] IN BLOOD BY AUTOMATED COUNT: 14 % (ref 11.5–15)
GFR, ESTIMATED: 87 ML/MIN/1.73M2
GFR, ESTIMATED: 88 ML/MIN/1.73M2
GLUCOSE SERPL-MCNC: 100 MG/DL (ref 74–99)
GLUCOSE SERPL-MCNC: 97 MG/DL (ref 74–99)
HCT VFR BLD AUTO: 24.5 % (ref 34–48)
HCT VFR BLD AUTO: 27.5 % (ref 34–48)
HGB BLD-MCNC: 7.8 G/DL (ref 11.5–15.5)
HGB BLD-MCNC: 8.7 G/DL (ref 11.5–15.5)
IMM GRANULOCYTES # BLD AUTO: 0.2 K/UL (ref 0–0.58)
IMM GRANULOCYTES NFR BLD: 4 % (ref 0–5)
LYMPHOCYTES NFR BLD: 0.89 K/UL (ref 1.5–4)
LYMPHOCYTES RELATIVE PERCENT: 16 % (ref 20–42)
MAGNESIUM SERPL-MCNC: 1.7 MG/DL (ref 1.6–2.6)
MCH RBC QN AUTO: 27.3 PG (ref 26–35)
MCH RBC QN AUTO: 27.4 PG (ref 26–35)
MCHC RBC AUTO-ENTMCNC: 31.6 G/DL (ref 32–34.5)
MCHC RBC AUTO-ENTMCNC: 31.8 G/DL (ref 32–34.5)
MCV RBC AUTO: 86 FL (ref 80–99.9)
MCV RBC AUTO: 86.2 FL (ref 80–99.9)
MICROORGANISM SPEC CULT: NORMAL
MONOCYTES NFR BLD: 0.49 K/UL (ref 0.1–0.95)
MONOCYTES NFR BLD: 9 % (ref 2–12)
NEUTROPHILS NFR BLD: 70 % (ref 43–80)
NEUTS SEG NFR BLD: 3.95 K/UL (ref 1.8–7.3)
PLATELET CONFIRMATION: NORMAL
PLATELET, FLUORESCENCE: 100 K/UL (ref 130–450)
PLATELET, FLUORESCENCE: 97 K/UL (ref 130–450)
PMV BLD AUTO: 11.4 FL (ref 7–12)
PMV BLD AUTO: 11.6 FL (ref 7–12)
POTASSIUM SERPL-SCNC: 3.2 MMOL/L (ref 3.5–5)
POTASSIUM SERPL-SCNC: 3.4 MMOL/L (ref 3.5–5)
PROCALCITONIN SERPL-MCNC: 1.6 NG/ML (ref 0–0.08)
PROT SERPL-MCNC: 5.5 G/DL (ref 6.4–8.3)
RBC # BLD AUTO: 2.85 M/UL (ref 3.5–5.5)
RBC # BLD AUTO: 3.19 M/UL (ref 3.5–5.5)
SERVICE CMNT-IMP: NORMAL
SODIUM SERPL-SCNC: 131 MMOL/L (ref 132–146)
SODIUM SERPL-SCNC: 132 MMOL/L (ref 132–146)
SPECIMEN DESCRIPTION: NORMAL
WBC OTHER # BLD: 5.6 K/UL (ref 4.5–11.5)
WBC OTHER # BLD: 6 K/UL (ref 4.5–11.5)

## 2025-01-14 PROCEDURE — 6360000002 HC RX W HCPCS: Performed by: INTERNAL MEDICINE

## 2025-01-14 PROCEDURE — 83735 ASSAY OF MAGNESIUM: CPT

## 2025-01-14 PROCEDURE — 36415 COLL VENOUS BLD VENIPUNCTURE: CPT

## 2025-01-14 PROCEDURE — 85025 COMPLETE CBC W/AUTO DIFF WBC: CPT

## 2025-01-14 PROCEDURE — 80053 COMPREHEN METABOLIC PANEL: CPT

## 2025-01-14 PROCEDURE — 6360000002 HC RX W HCPCS: Performed by: SPECIALIST

## 2025-01-14 PROCEDURE — 85027 COMPLETE CBC AUTOMATED: CPT

## 2025-01-14 PROCEDURE — 99232 SBSQ HOSP IP/OBS MODERATE 35: CPT | Performed by: INTERNAL MEDICINE

## 2025-01-14 PROCEDURE — 84145 PROCALCITONIN (PCT): CPT

## 2025-01-14 PROCEDURE — 2500000003 HC RX 250 WO HCPCS: Performed by: SPECIALIST

## 2025-01-14 PROCEDURE — 2500000003 HC RX 250 WO HCPCS: Performed by: INTERNAL MEDICINE

## 2025-01-14 PROCEDURE — 80048 BASIC METABOLIC PNL TOTAL CA: CPT

## 2025-01-14 PROCEDURE — 6370000000 HC RX 637 (ALT 250 FOR IP)

## 2025-01-14 PROCEDURE — 71046 X-RAY EXAM CHEST 2 VIEWS: CPT

## 2025-01-14 PROCEDURE — 1200000000 HC SEMI PRIVATE

## 2025-01-14 PROCEDURE — 6360000002 HC RX W HCPCS: Performed by: SURGERY

## 2025-01-14 PROCEDURE — 2580000003 HC RX 258: Performed by: INTERNAL MEDICINE

## 2025-01-14 PROCEDURE — 6370000000 HC RX 637 (ALT 250 FOR IP): Performed by: INTERNAL MEDICINE

## 2025-01-14 PROCEDURE — 99232 SBSQ HOSP IP/OBS MODERATE 35: CPT | Performed by: SURGERY

## 2025-01-14 RX ORDER — MAGNESIUM SULFATE IN WATER 40 MG/ML
2000 INJECTION, SOLUTION INTRAVENOUS ONCE
Status: COMPLETED | OUTPATIENT
Start: 2025-01-14 | End: 2025-01-14

## 2025-01-14 RX ORDER — POTASSIUM CHLORIDE 7.45 MG/ML
10 INJECTION INTRAVENOUS
Status: COMPLETED | OUTPATIENT
Start: 2025-01-14 | End: 2025-01-15

## 2025-01-14 RX ORDER — POTASSIUM CHLORIDE 1500 MG/1
20 TABLET, EXTENDED RELEASE ORAL ONCE
Status: COMPLETED | OUTPATIENT
Start: 2025-01-14 | End: 2025-01-14

## 2025-01-14 RX ORDER — INDOCYANINE GREEN AND WATER 25 MG
5 KIT INJECTION
Status: COMPLETED | OUTPATIENT
Start: 2025-01-17 | End: 2025-01-17

## 2025-01-14 RX ADMIN — MAGNESIUM SULFATE HEPTAHYDRATE 2000 MG: 40 INJECTION, SOLUTION INTRAVENOUS at 11:39

## 2025-01-14 RX ADMIN — METRONIDAZOLE 500 MG: 500 INJECTION, SOLUTION INTRAVENOUS at 07:08

## 2025-01-14 RX ADMIN — METOPROLOL SUCCINATE 50 MG: 50 TABLET, EXTENDED RELEASE ORAL at 10:43

## 2025-01-14 RX ADMIN — METOPROLOL SUCCINATE 50 MG: 50 TABLET, EXTENDED RELEASE ORAL at 20:44

## 2025-01-14 RX ADMIN — POTASSIUM CHLORIDE 10 MEQ: 7.45 INJECTION INTRAVENOUS at 22:46

## 2025-01-14 RX ADMIN — POTASSIUM CHLORIDE 10 MEQ: 7.45 INJECTION INTRAVENOUS at 14:33

## 2025-01-14 RX ADMIN — METRONIDAZOLE 500 MG: 500 INJECTION, SOLUTION INTRAVENOUS at 22:49

## 2025-01-14 RX ADMIN — ONDANSETRON 4 MG: 2 INJECTION, SOLUTION INTRAMUSCULAR; INTRAVENOUS at 11:09

## 2025-01-14 RX ADMIN — POLYETHYLENE GLYCOL 3350 17 G: 17 POWDER, FOR SOLUTION ORAL at 10:43

## 2025-01-14 RX ADMIN — POTASSIUM CHLORIDE 10 MEQ: 7.45 INJECTION INTRAVENOUS at 19:39

## 2025-01-14 RX ADMIN — DEXTROSE AND SODIUM CHLORIDE: 5; 900 INJECTION, SOLUTION INTRAVENOUS at 09:27

## 2025-01-14 RX ADMIN — ATORVASTATIN CALCIUM 10 MG: 10 TABLET, FILM COATED ORAL at 10:43

## 2025-01-14 RX ADMIN — METRONIDAZOLE 500 MG: 500 INJECTION, SOLUTION INTRAVENOUS at 16:24

## 2025-01-14 RX ADMIN — POTASSIUM CHLORIDE 20 MEQ: 1500 TABLET, EXTENDED RELEASE ORAL at 10:43

## 2025-01-14 RX ADMIN — POTASSIUM CHLORIDE 10 MEQ: 7.45 INJECTION INTRAVENOUS at 16:23

## 2025-01-14 RX ADMIN — GABAPENTIN 300 MG: 300 CAPSULE ORAL at 10:43

## 2025-01-14 RX ADMIN — WATER 2000 MG: 1 INJECTION INTRAMUSCULAR; INTRAVENOUS; SUBCUTANEOUS at 14:28

## 2025-01-14 RX ADMIN — SODIUM CHLORIDE, PRESERVATIVE FREE 10 ML: 5 INJECTION INTRAVENOUS at 17:50

## 2025-01-14 RX ADMIN — SODIUM CHLORIDE, PRESERVATIVE FREE 10 ML: 5 INJECTION INTRAVENOUS at 11:09

## 2025-01-14 NOTE — PROGRESS NOTES
GENERAL SURGERY  DAILY PROGRESS NOTE  2025    Subjective:  Once again denies any pain this AM. Reporting some back pain from laying in bed       Objective:  Last 24Hrs  Temp  Av.8 °F (36.6 °C)  Min: 97.5 °F (36.4 °C)  Max: 98.2 °F (36.8 °C)  Resp  Av  Min: 18  Max: 18  Pulse  Av.5  Min: 63  Max: 82  Systolic (24hrs), Av , Min:132 , Max:141     Diastolic (24hrs), Av, Min:56, Max:57    SpO2  Av.5 %  Min: 94 %  Max: 99 %    I/O last 3 completed shifts:  In: -   Out: 1600 [Urine:1600]      GENERAL:  Laying in bed, awake, alert, cooperative, no apparent distress  HEAD: No gross abnormalities   LUNGS:  No increased work of breathing  CARDIOVASCULAR:  RR  ABDOMEN:  Soft, RUQ TTP   SKIN: Warm and dry      CBC  Recent Labs     25  0345 25  0357   WBC 8.0 8.0   RBC 3.08* 3.25*   HGB 8.4* 8.8*   HCT 25.6* 27.4*   MCV 83.1 84.3   MCH 27.3 27.1   MCHC 32.8 32.1   RDW 13.6 13.6   MPV 11.1 11.0       CMP  Recent Labs     25  0345 25  1750 25  2313 25  0357 25  1520   *   < > 126* 127* 130*   K 4.3   < > 3.5 3.5 3.3*   CL 96*   < > 95* 95* 99   CO2 22   < > 21* 23 23   BUN 37*   < > 23 19 14   CREATININE 1.4*   < > 0.9 0.9 0.8   GLUCOSE 148*   < > 114* 120* 127*   CALCIUM 8.4*   < > 8.4* 8.3* 7.9*   BILITOT 0.4  --   --  0.3  --    ALKPHOS 632*  --   --  397*  --    AST 47*  --   --  22  --    ALT 26  --   --  20  --     < > = values in this interval not displayed.         Assessment/Plan:  84 y.o. female RUQ likely 2/2 acute cholecystitis     Sodium improving, appreciate nephro recs   Will tentatively plan for robo leah    Continue abx per ID, bcx w trung Salamanca MD  General Surgery Resident, PGY-3    Electronically signed on 2025 at 7:20 AM        Attending Physician Statement:    Chief Complaint: cholecystitis    I have examined the patient and performed the key aspects of physical exam, reviewed the record (including all new

## 2025-01-14 NOTE — CARE COORDINATION
Introduced my self and provided explanation of CM role to patient. Patient is awake, alert, and aware of current diagnosis and discharge planning. Patient is from home with her son independently. Patient uses a walker and a cane at home. PCP is Dr. Mg. Preferred pharmacy is [facility] in [place]. Patient states her discharge plan is to return home with no needs. Ary on Friday.   Electronically signed by Laura Tompkins RN on 1/14/2025 at 10:17 AM

## 2025-01-14 NOTE — PROGRESS NOTES
Department of Internal Medicine  Nephrology Attending Progress Note      Events reviewed.     SUBJECTIVE: We are following Ms. Jozef Qiu for PERCY and hyponatremia. Patient reports no new complaints today.    PHYSICAL EXAM:      Vitals:    VITALS:  BP (!) 133/55   Pulse 80   Temp 98.4 °F (36.9 °C) (Oral)   Resp 18   Ht 1.549 m (5' 1\")   Wt 73.4 kg (161 lb 12.8 oz)   SpO2 96%   BMI 30.57 kg/m²   24HR INTAKE/OUTPUT:    Intake/Output Summary (Last 24 hours) at 1/14/2025 0948  Last data filed at 1/13/2025 2244  Gross per 24 hour   Intake --   Output 400 ml   Net -400 ml       Constitutional: Patient is awake, alert in no distress  HEENT: Pupils are equal reactive  Respiratory: Lungs are clear  Cardiovascular/Edema: Heart sounds are regular  Gastrointestinal: Abdomen soft  Neurologic: Nonfocal  Skin: No lesions  Other: No edema      Scheduled Meds:   [START ON 1/17/2025] indocyanine green  5 mg IntraVENous On Call to OR    enoxaparin  40 mg SubCUTAneous Daily    cefTRIAXone (ROCEPHIN) IV  2,000 mg IntraVENous Q24H    metroNIDAZOLE  500 mg IntraVENous Q8H    gabapentin  300 mg Oral QAM    metoprolol succinate  50 mg Oral BID    atorvastatin  10 mg Oral Daily    sodium chloride flush  5-40 mL IntraVENous 2 times per day     Continuous Infusions:   dextrose 5 % and 0.9 % NaCl 100 mL/hr at 01/14/25 0927    sodium chloride Stopped (01/11/25 1325)     PRN Meds:.HYDROcodone-acetaminophen, sodium chloride flush, sodium chloride, ondansetron **OR** ondansetron, polyethylene glycol, acetaminophen **OR** acetaminophen      DATA:    CBC:   Lab Results   Component Value Date/Time    WBC 5.6 01/14/2025 08:27 AM    RBC 3.19 01/14/2025 08:27 AM    HGB 8.7 01/14/2025 08:27 AM    HCT 27.5 01/14/2025 08:27 AM    MCV 86.2 01/14/2025 08:27 AM    MCH 27.3 01/14/2025 08:27 AM    MCHC 31.6 01/14/2025 08:27 AM    RDW 13.9 01/14/2025 08:27 AM     01/11/2025 05:57 AM    MPV 11.6 01/14/2025 08:27 AM     CMP:    Lab Results

## 2025-01-14 NOTE — PROGRESS NOTES
[facility]  [facility]  Progress Note    SUBJECTIVE:  No chief complaint on file.    She has less abdominal pain.  Tolerating antibiotics.  Had an episode of emesis but she thinks it was related to a stool softener.    Review of systems:  As stated above in the chief complaint, otherwise negative.    Medications:  Scheduled Meds:   [START ON 2025] indocyanine green  5 mg IntraVENous On Call to OR    potassium chloride  10 mEq IntraVENous Q1H    magnesium sulfate  2,000 mg IntraVENous Once    enoxaparin  40 mg SubCUTAneous Daily    cefTRIAXone (ROCEPHIN) IV  2,000 mg IntraVENous Q24H    metroNIDAZOLE  500 mg IntraVENous Q8H    gabapentin  300 mg Oral QAM    metoprolol succinate  50 mg Oral BID    atorvastatin  10 mg Oral Daily    sodium chloride flush  5-40 mL IntraVENous 2 times per day     Continuous Infusions:   dextrose 5 % and 0.9 % NaCl 50 mL/hr at 25 1115    sodium chloride Stopped (25 1325)     PRN Meds:HYDROcodone-acetaminophen, sodium chloride flush, sodium chloride, ondansetron **OR** ondansetron, polyethylene glycol, acetaminophen **OR** acetaminophen    OBJECTIVE:  BP (!) 133/55   Pulse 80   Temp 99.1 °F (37.3 °C) (Oral)   Resp 18   Ht 1.549 m (5' 1\")   Wt 73.4 kg (161 lb 12.8 oz)   SpO2 94%   BMI 30.57 kg/m²   Temp  Av.4 °F (36.9 °C)  Min: 97.7 °F (36.5 °C)  Max: 99.1 °F (37.3 °C)  Constitutional: The patient is lying in bed.  She is awake and in no distress.  Son in room.  Skin: Warm and dry. No rashes were noted.   HEENT: Round and reactive pupils.  Moist mucous membranes.  No ulcerations or thrush.  Neck: Supple to movements.   Chest: Good breath sounds.  No crackles.  Cardiovascular: Heart sounds rhythmic and regular.  Abdomen: Positive bowel sounds to auscultation.  Tender to palpation RUQ. No masses felt.  Extremities: No edema.  Lines: Peripheral.    Laboratory and Tests:  Lab Results   Component Value Date    .0 (H) 2025

## 2025-01-14 NOTE — PROGRESS NOTES
GENERAL SURGERY  DAILY PROGRESS NOTE  1/15/2025    Subjective:  Denies any pain this AM. Tolerating regular diet. No nausea or emesis.     I/O last 3 completed shifts:  In: -   Out: 400 [Urine:400]  I/O this shift:  In: 4886.6 [I.V.:3851.5; IV Piggyback:1035.1]  Out: 200 [Urine:200]      Objective:  BP (!) 115/50   Pulse 74   Temp 98 °F (36.7 °C) (Oral)   Resp 21   Ht 1.549 m (5' 1\")   Wt 76.2 kg (168 lb)   SpO2 96%   BMI 31.74 kg/m²     GENERAL:  Laying in bed, awake, alert, cooperative, no apparent distress  HEAD: No gross abnormalities   LUNGS:  No increased work of breathing  CARDIOVASCULAR:  RR  ABDOMEN:  Soft, non tender this AM   SKIN: Warm and dry      Lab Results   Component Value Date    WBC 6.0 01/14/2025    HGB 7.8 (L) 01/14/2025    HCT 24.5 (L) 01/14/2025    MCV 86.0 01/14/2025     (L) 01/11/2025     Lab Results   Component Value Date     01/14/2025     (L) 01/14/2025    K 3.2 (L) 01/14/2025    K 3.4 (L) 01/14/2025    CL 99 01/14/2025    CL 99 01/14/2025    CO2 24 01/14/2025    CO2 23 01/14/2025    BUN 8 01/14/2025    BUN 8 01/14/2025    CREATININE 0.7 01/14/2025    CREATININE 0.7 01/14/2025    GLUCOSE 100 (H) 01/14/2025    GLUCOSE 97 01/14/2025    CALCIUM 8.1 (L) 01/14/2025    CALCIUM 8.1 (L) 01/14/2025    BILITOT 0.3 01/14/2025    ALKPHOS 343 (H) 01/14/2025    AST 16 01/14/2025    ALT 15 01/14/2025    LABGLOM 88 01/14/2025    LABGLOM 87 01/14/2025    GFRAA >60 09/14/2022         Assessment/Plan:  84 y.o. female RUQ pain 2/2 acute cholecystitis    Na improving, 132 yesterday   Continue Abx per ID, bx w serratia   Tentative plan for cholecystectomy 1/17      Electronically signed by Dona Salamanca MD on 1/15/2025 at 6:59 AM          Attending Physician Statement:    Chief Complaint: Cholecystitis    I have examined the patient and performed the key aspects of physical exam, reviewed the record (including all new notes, pertinent radiology images which are independently reviewed and  laboratory findings), and discussed the case with the surgical team.  I agree with the assessment and plan with the following additions, corrections, and changes. 14pt review of symptoms completed and negative except as mentioned.    No issues.  Sodium 132.  Cholecystectomy Friday.    Kiet Chambers MD  01/15/25  1:21 PM    NOTE: This report, in part or full, may have been transcribed using voice recognition software. Every effort was made to ensure accuracy; however, inadvertent computerized transcription errors may be present. Please excuse any transcriptional grammatical or spelling errors that may have escaped my editorial review.

## 2025-01-14 NOTE — PROGRESS NOTES
[facility] Hospitalist   Progress Note    Admitting Date and Time: 1/10/2025 11:10 AM  Admit Dx: Abdominal pain, right upper quadrant [R10.11]  Hypochloremia [E87.8]  Cholecystitis [K81.9]  Hyponatremia [E87.1]  PERCY (acute kidney injury) (HCC) [N17.9]    Subjective:    Patient was admitted with Abdominal pain, right upper quadrant [R10.11]  Hypochloremia [E87.8]  Cholecystitis [K81.9]  Hyponatremia [E87.1]  PERCY (acute kidney injury) (HCC) [N17.9]. Patient denies fever, chills, cp, sob, n/v.     [START ON 1/17/2025] indocyanine green  5 mg IntraVENous On Call to OR    enoxaparin  40 mg SubCUTAneous Daily    cefTRIAXone (ROCEPHIN) IV  2,000 mg IntraVENous Q24H    metroNIDAZOLE  500 mg IntraVENous Q8H    gabapentin  300 mg Oral QAM    metoprolol succinate  50 mg Oral BID    atorvastatin  10 mg Oral Daily    sodium chloride flush  5-40 mL IntraVENous 2 times per day     HYDROcodone-acetaminophen, 1 tablet, Daily PRN  sodium chloride flush, 5-40 mL, PRN  sodium chloride, , PRN  ondansetron, 4 mg, Q8H PRN   Or  ondansetron, 4 mg, Q6H PRN  polyethylene glycol, 17 g, Daily PRN  acetaminophen, 650 mg, Q6H PRN   Or  acetaminophen, 650 mg, Q6H PRN         Objective:    BP (!) 133/55   Pulse 80   Temp 98.9 °F (37.2 °C) (Oral)   Resp 18   Ht 1.549 m (5' 1\")   Wt 73.4 kg (161 lb 12.8 oz)   SpO2 94%   BMI 30.57 kg/m²   Skin: warm and dry, no rash or erythema  Pulmonary/Chest: clear to auscultation bilaterally- no wheezes, rales or rhonchi, normal air movement, no respiratory distress  Cardiovascular: rhythm reg at rate of 84  Abdomen: soft, non-tender, non-distended, normal bowel sounds, no masses or organomegaly  Extremities: no cyanosis, no clubbing, and no edema      Recent Labs     01/13/25  0357 01/13/25  1520 01/14/25  0827   * 130* 131*  132   K 3.5 3.3* 3.4*  3.2*   CL 95* 99 99  99   CO2 23 23 23  24   BUN 19 14 8  8   CREATININE 0.9 0.8 0.7  0.7   GLUCOSE 120* 127* 97  100*   CALCIUM  08:27 AM    CL 99 01/14/2025 08:27 AM    CL 99 01/14/2025 08:27 AM    CO2 24 01/14/2025 08:27 AM    CO2 23 01/14/2025 08:27 AM    BUN 8 01/14/2025 08:27 AM    BUN 8 01/14/2025 08:27 AM    CREATININE 0.7 01/14/2025 08:27 AM    CREATININE 0.7 01/14/2025 08:27 AM    CREATININE 1.0 03/06/2019 12:00 AM    CALCIUM 8.1 01/14/2025 08:27 AM    CALCIUM 8.1 01/14/2025 08:27 AM    GFRAA >60 09/14/2022 10:35 AM    LABGLOM 88 01/14/2025 08:27 AM    LABGLOM 87 01/14/2025 08:27 AM    LABGLOM >60 02/23/2024 10:05 AM    GLUCOSE 100 01/14/2025 08:27 AM    GLUCOSE 97 01/14/2025 08:27 AM     Magnesium:    Lab Results   Component Value Date/Time    MG 1.7 01/14/2025 08:27 AM        Radiology:   XR CHEST (2 VW)   Final Result   Left lower lung atelectasis/pneumonia with left pleural effusion.         CT ABDOMEN PELVIS WO CONTRAST Additional Contrast? Oral   Final Result   1. Cholelithiasis.   2. Nonspecific pneumobilia, yet likely related to recent biliary procedure   and placement of common bile duct stent.   3. Exophytic mass is seen along right anterior aspect of the uterus which may   indicate a fibroid.  Ultrasound follow-up recommended.         NM HEPATOBILIARY   Final Result   The ultrasound findings support cholelithiasis with mild cholecystitis.      Nonvisualization of the gallbladder at 60 minutes on HIDA scan.         US GALLBLADDER RUQ   Final Result   Cholelithiasis with mild gallbladder wall thickening and positive sonographic   Macdonald sign, concerning for acute cholecystitis.             Assessment:    Principal Problem:    Cholecystitis  Active Problems:    Hyponatremia    CHUY (acute kidney injury) (HCC)    Thrombocytopenia (HCC)    Controlled type 2 diabetes mellitus without complication (HCC)  Resolved Problems:    * No resolved hospital problems. *      Plan:  Chuy continue iv fluids. renal following   Acidosis monitor  Hyponatremia monitor closely on fluids  Htn continue med  Dm type 2 controlled monitor bs and tx with

## 2025-01-14 NOTE — PATIENT CARE CONFERENCE
[facility] Quality Flow/Interdisciplinary Rounds Progress Note        Quality Flow Rounds held on January 14, 2025    Disciplines Attending:  Bedside Nurse, , , and Nursing Unit Leadership    Colleen Qiu was admitted on 1/10/2025 11:10 AM    Anticipated Discharge Date:       Disposition:    Aneesh Score:  Aneesh Scale Score: 19    [facility] RISK OF UNPLANNED READMISSION 2.0             13.3 Total Score        Discussed patient goal for the day, patient clinical progression, and barriers to discharge.  The following Goal(s) of the Day/Commitment(s) have been identified:  Diagnostics - Report Results and Labs - Report Results      Leslie Mckenzie RN  January 14, 2025

## 2025-01-14 NOTE — ACP (ADVANCE CARE PLANNING)
Advance Care Planning   Healthcare Decision Maker:    Primary Decision Maker: RICHARDSONSOTO - Jesse - [phone]    Click here to complete Healthcare Decision Makers including selection of the Healthcare Decision Maker Relationship (ie \"Primary\").

## 2025-01-15 ENCOUNTER — APPOINTMENT (OUTPATIENT)
Dept: GENERAL RADIOLOGY | Age: 85
DRG: 853 | End: 2025-01-15
Payer: MEDICARE

## 2025-01-15 PROBLEM — E87.6 HYPOKALEMIA: Status: ACTIVE | Noted: 2025-01-15

## 2025-01-15 LAB
ALBUMIN SERPL-MCNC: 2.1 G/DL (ref 3.5–5.2)
ALP SERPL-CCNC: 259 U/L (ref 35–104)
ALT SERPL-CCNC: 11 U/L (ref 0–32)
ANION GAP SERPL CALCULATED.3IONS-SCNC: 5 MMOL/L (ref 7–16)
AST SERPL-CCNC: 16 U/L (ref 0–31)
BASOPHILS # BLD: 0.01 K/UL (ref 0–0.2)
BASOPHILS NFR BLD: 0 % (ref 0–2)
BILIRUB SERPL-MCNC: 0.3 MG/DL (ref 0–1.2)
BNP SERPL-MCNC: 4284 PG/ML (ref 0–450)
BUN SERPL-MCNC: 6 MG/DL (ref 6–23)
CALCIUM SERPL-MCNC: 7.9 MG/DL (ref 8.6–10.2)
CHLORIDE SERPL-SCNC: 102 MMOL/L (ref 98–107)
CO2 SERPL-SCNC: 25 MMOL/L (ref 22–29)
CREAT SERPL-MCNC: 0.6 MG/DL (ref 0.5–1)
EOSINOPHIL # BLD: 0.11 K/UL (ref 0.05–0.5)
EOSINOPHILS RELATIVE PERCENT: 2 % (ref 0–6)
ERYTHROCYTE [DISTWIDTH] IN BLOOD BY AUTOMATED COUNT: 14.1 % (ref 11.5–15)
FERRITIN SERPL-MCNC: 393 NG/ML
FOLATE SERPL-MCNC: 3.5 NG/ML (ref 4.8–24.2)
GFR, ESTIMATED: 89 ML/MIN/1.73M2
GLUCOSE SERPL-MCNC: 92 MG/DL (ref 74–99)
HCT VFR BLD AUTO: 24.5 % (ref 34–48)
HGB BLD-MCNC: 7.8 G/DL (ref 11.5–15.5)
IMM GRANULOCYTES # BLD AUTO: 0.29 K/UL (ref 0–0.58)
IMM GRANULOCYTES NFR BLD: 6 % (ref 0–5)
IRON SATN MFR SERPL: 11 % (ref 15–50)
IRON SERPL-MCNC: 14 UG/DL (ref 37–145)
LYMPHOCYTES NFR BLD: 0.92 K/UL (ref 1.5–4)
LYMPHOCYTES RELATIVE PERCENT: 17 % (ref 20–42)
MCH RBC QN AUTO: 27 PG (ref 26–35)
MCHC RBC AUTO-ENTMCNC: 31.8 G/DL (ref 32–34.5)
MCV RBC AUTO: 84.8 FL (ref 80–99.9)
MONOCYTES NFR BLD: 0.46 K/UL (ref 0.1–0.95)
MONOCYTES NFR BLD: 9 % (ref 2–12)
NEUTROPHILS NFR BLD: 66 % (ref 43–80)
NEUTS SEG NFR BLD: 3.5 K/UL (ref 1.8–7.3)
PLATELET, FLUORESCENCE: 104 K/UL (ref 130–450)
PMV BLD AUTO: 11.1 FL (ref 7–12)
POTASSIUM SERPL-SCNC: 3.9 MMOL/L (ref 3.5–5)
PROT SERPL-MCNC: 5.1 G/DL (ref 6.4–8.3)
RBC # BLD AUTO: 2.89 M/UL (ref 3.5–5.5)
SODIUM SERPL-SCNC: 132 MMOL/L (ref 132–146)
TIBC SERPL-MCNC: 133 UG/DL (ref 250–450)
VIT B12 SERPL-MCNC: 843 PG/ML (ref 211–946)
WBC OTHER # BLD: 5.3 K/UL (ref 4.5–11.5)

## 2025-01-15 PROCEDURE — 82746 ASSAY OF FOLIC ACID SERUM: CPT

## 2025-01-15 PROCEDURE — 6360000002 HC RX W HCPCS: Performed by: SURGERY

## 2025-01-15 PROCEDURE — 2500000003 HC RX 250 WO HCPCS: Performed by: SPECIALIST

## 2025-01-15 PROCEDURE — 83550 IRON BINDING TEST: CPT

## 2025-01-15 PROCEDURE — 6360000002 HC RX W HCPCS: Performed by: INTERNAL MEDICINE

## 2025-01-15 PROCEDURE — 2580000003 HC RX 258: Performed by: INTERNAL MEDICINE

## 2025-01-15 PROCEDURE — 99232 SBSQ HOSP IP/OBS MODERATE 35: CPT | Performed by: INTERNAL MEDICINE

## 2025-01-15 PROCEDURE — 2500000003 HC RX 250 WO HCPCS: Performed by: INTERNAL MEDICINE

## 2025-01-15 PROCEDURE — 2500000003 HC RX 250 WO HCPCS: Performed by: RADIOLOGY

## 2025-01-15 PROCEDURE — 6360000002 HC RX W HCPCS: Performed by: SPECIALIST

## 2025-01-15 PROCEDURE — 92610 EVALUATE SWALLOWING FUNCTION: CPT

## 2025-01-15 PROCEDURE — 6360000002 HC RX W HCPCS

## 2025-01-15 PROCEDURE — 74230 X-RAY XM SWLNG FUNCJ C+: CPT

## 2025-01-15 PROCEDURE — 6370000000 HC RX 637 (ALT 250 FOR IP): Performed by: NURSE PRACTITIONER

## 2025-01-15 PROCEDURE — 36415 COLL VENOUS BLD VENIPUNCTURE: CPT

## 2025-01-15 PROCEDURE — 6370000000 HC RX 637 (ALT 250 FOR IP): Performed by: INTERNAL MEDICINE

## 2025-01-15 PROCEDURE — 83880 ASSAY OF NATRIURETIC PEPTIDE: CPT

## 2025-01-15 PROCEDURE — 80053 COMPREHEN METABOLIC PANEL: CPT

## 2025-01-15 PROCEDURE — P9047 ALBUMIN (HUMAN), 25%, 50ML: HCPCS

## 2025-01-15 PROCEDURE — 82728 ASSAY OF FERRITIN: CPT

## 2025-01-15 PROCEDURE — 83540 ASSAY OF IRON: CPT

## 2025-01-15 PROCEDURE — 92611 MOTION FLUOROSCOPY/SWALLOW: CPT

## 2025-01-15 PROCEDURE — 1200000000 HC SEMI PRIVATE

## 2025-01-15 PROCEDURE — 82607 VITAMIN B-12: CPT

## 2025-01-15 PROCEDURE — 85025 COMPLETE CBC W/AUTO DIFF WBC: CPT

## 2025-01-15 RX ORDER — IPRATROPIUM BROMIDE AND ALBUTEROL SULFATE 2.5; .5 MG/3ML; MG/3ML
1 SOLUTION RESPIRATORY (INHALATION) EVERY 4 HOURS PRN
Status: DISCONTINUED | OUTPATIENT
Start: 2025-01-15 | End: 2025-01-18 | Stop reason: HOSPADM

## 2025-01-15 RX ORDER — ALBUMIN (HUMAN) 12.5 G/50ML
25 SOLUTION INTRAVENOUS EVERY 8 HOURS
Status: COMPLETED | OUTPATIENT
Start: 2025-01-15 | End: 2025-01-16

## 2025-01-15 RX ADMIN — ONDANSETRON 4 MG: 2 INJECTION, SOLUTION INTRAMUSCULAR; INTRAVENOUS at 18:25

## 2025-01-15 RX ADMIN — METOPROLOL SUCCINATE 50 MG: 50 TABLET, EXTENDED RELEASE ORAL at 21:20

## 2025-01-15 RX ADMIN — ALBUMIN (HUMAN) 25 G: 0.25 INJECTION, SOLUTION INTRAVENOUS at 17:10

## 2025-01-15 RX ADMIN — METRONIDAZOLE 500 MG: 500 INJECTION, SOLUTION INTRAVENOUS at 23:36

## 2025-01-15 RX ADMIN — IPRATROPIUM BROMIDE AND ALBUTEROL SULFATE 1 DOSE: 2.5; .5 SOLUTION RESPIRATORY (INHALATION) at 04:24

## 2025-01-15 RX ADMIN — METRONIDAZOLE 500 MG: 500 INJECTION, SOLUTION INTRAVENOUS at 06:17

## 2025-01-15 RX ADMIN — WATER 2000 MG: 1 INJECTION INTRAMUSCULAR; INTRAVENOUS; SUBCUTANEOUS at 12:32

## 2025-01-15 RX ADMIN — ENOXAPARIN SODIUM 40 MG: 100 INJECTION SUBCUTANEOUS at 08:54

## 2025-01-15 RX ADMIN — BARIUM SULFATE 120 ML: 400 SUSPENSION ORAL at 13:13

## 2025-01-15 RX ADMIN — PETROLATUM: 420 OINTMENT TOPICAL at 10:07

## 2025-01-15 RX ADMIN — SODIUM CHLORIDE, PRESERVATIVE FREE 10 ML: 5 INJECTION INTRAVENOUS at 21:22

## 2025-01-15 RX ADMIN — ATORVASTATIN CALCIUM 10 MG: 10 TABLET, FILM COATED ORAL at 08:54

## 2025-01-15 RX ADMIN — METOPROLOL SUCCINATE 50 MG: 50 TABLET, EXTENDED RELEASE ORAL at 08:54

## 2025-01-15 RX ADMIN — ALBUMIN (HUMAN) 25 G: 0.25 INJECTION, SOLUTION INTRAVENOUS at 09:38

## 2025-01-15 RX ADMIN — BARIUM SULFATE 10 ML: 400 PASTE ORAL at 13:13

## 2025-01-15 RX ADMIN — METRONIDAZOLE 500 MG: 500 INJECTION, SOLUTION INTRAVENOUS at 14:36

## 2025-01-15 RX ADMIN — DEXTROSE AND SODIUM CHLORIDE: 5; 900 INJECTION, SOLUTION INTRAVENOUS at 10:38

## 2025-01-15 RX ADMIN — BARIUM SULFATE 70 ML: 0.81 POWDER, FOR SUSPENSION ORAL at 13:13

## 2025-01-15 RX ADMIN — GABAPENTIN 300 MG: 300 CAPSULE ORAL at 08:54

## 2025-01-15 NOTE — PATIENT CARE CONFERENCE
[facility] Quality Flow/Interdisciplinary Rounds Progress Note        Quality Flow Rounds held on January 15, 2025    Disciplines Attending:  Bedside Nurse, , , and Nursing Unit Leadership    Colleen Qiu was admitted on 1/10/2025 11:10 AM    Anticipated Discharge Date:       Disposition:    Aneesh Score:  Aneesh Scale Score: 19    [facility] RISK OF UNPLANNED READMISSION 2.0             13.8 Total Score        Discussed patient goal for the day, patient clinical progression, and barriers to discharge.  The following Goal(s) of the Day/Commitment(s) have been identified:  Labs - Report Results      Octavia Jarrell RN  January 15, 2025

## 2025-01-15 NOTE — PROGRESS NOTES
SPEECH/LANGUAGE PATHOLOGY  CLINICAL ASSESSMENT OF SWALLOWING FUNCTION   and PLAN OF CARE      PATIENT NAME:  Colleen Qiu  (female)     MRN:  [id number]    :  1940  (84 y.o.)  STATUS:  Inpatient: Room 0504/0504-A    TODAY'S DATE:  1/15/2025  ORDER DATE, DESCRIPTION AND REFERRING PROVIDER:    SLP swallowing-dysphagia evaluation and treatment  Start:  25,   End:  25,   ONE TIME,   Standing Count:  1 Occurrences,   R       Hric, Shai, DO  REASON FOR REFERRAL: Question of dysphagia    EVALUATING THERAPIST: JOSE Spain                 RESULTS:    DYSPHAGIA DIAGNOSIS:   unable to rule out silent aspiration bedside    No coughing and/or throat clearing were displayed at bedside; however, audible crackle/moist respirations increased with PO intake.      DIET RECOMMENDATIONS:  NPO until MBSS can be completed        FEEDING RECOMMENDATIONS:     Assistance level:  Not applicable      Compensatory strategies recommended: Thorough oral care to prevent colonization of oral bacteria, Upright in bed/ chair as tolerated, Effortful swallow      Discussed recommendations with:  patient nurse via phone    SPEECH THERAPY  PLAN OF CARE   The dysphagia POC is established based on physician order, dysphagia diagnosis and results of clinical assessment     Will make further recommendations/changes to POC once MBSS is completed.    Conditions Requiring Skilled Therapeutic Intervention for dysphagia:    Patient is performing below functional baseline d/t  current acute condition, respiratory compromise, multiple medications, and/or increased dependency upon caregivers.  Audible crackle/moist respirations that increased with PO intake     Specific dysphagia interventions to include:     MBSS to fully assess oropharyngeal swallow function and to assist in determining the least restrictive PO diet to maintain adequate nutrition/hydration     Specific instructions for next treatment:  MBSS to be

## 2025-01-15 NOTE — PROGRESS NOTES
the diagnosis, prognosis and plan of care     Rehabilitation Potential/Prognosis: excellent                      ADMITTING DIAGNOSIS: Abdominal pain, right upper quadrant [R10.11]  Hypochloremia [E87.8]  Cholecystitis [K81.9]  Hyponatremia [E87.1]  PERCY (acute kidney injury) (HCC) [N17.9]     VISIT DIAGNOSIS:   Visit Diagnoses         Codes    Hypochloremia     E87.8    Abdominal pain, right upper quadrant     R10.11                PATIENT REPORT/COMPLAINT: denies difficulty swallowing    PRIOR LEVEL OF SWALLOW FUNCTION:    Past History of Oropharyngeal Dysphagia?:  none reported    Home diet: Regular consistency solids (IDDSI level 7) with  thin liquids (IDDSI level 0)  Current Diet Order:  Diet NPO Exceptions are: Sips of Water with Meds  ADULT DIET; Regular; 1000 ml    PROCEDURE:  Consistencies Administered During the Evaluation   Liquids: thin liquid   Solids:  Pureed  and Hard solid      Method of Intake:   cup, straw, spoon  Self fed, Fed by clinician      Position:   Sitting in bed with head elevated above 75 degrees    INSTRUMENTAL ASSESSMENT:    ORAL PREP/ ORAL PHASE:    prolonged mastication resulting in functional recollection and bolus formation      PHARYNGEAL PHASE:     ONSET TIME       Onset time of the pharyngeal swallow was adequate       PHARYNGEAL RESIDUALS        Vallecula/Pharyngeal Wall           No significant residuals were noted in the vallecula      Pyriform Sinuses      No significant residuals were noted in the pyriform sinuses     LARYNGEAL PENETRATION   Laryngeal penetration was not present during this evaluation    ASPIRATION  Aspiration was not present during this evaluation    PENETRATION-ASPIRATION SCALE (PAS):  THIN 1 = Material does not enter the airway  MILDLY THICK item not administered  MODERATELY THICK item not administered  PUREE 1 = Material does not enter the airway  HARD SOLID 1 = Material does not enter the airway       COMPENSATORY STRATEGIES    Compensatory strategies  were not attempted      STRUCTURAL/FUNCTIONAL ANOMALIES   No structural/functional anomalies were noted    CERVICAL ESOPHAGEAL STAGE :     The cervical esophagus appeared adequate          ___________    Cognition:   Within functional limits for this exam    Oral Peripheral Examination   Adequate lingual/labial strength     Current Respiratory Status   room air     Parameters of Speech Production  Respiration:  Adequate for speech production  Quality:   Within functional limits  Intensity: Within functional limits    Pain: No pain reported.    EDUCATION:   The Speech Language Pathologist (SLP) completed education regarding results of evaluation and that intervention is not warranted at this time.  Learner: Patient  Education: Reviewed results and recommendations of this evaluation  Evaluation of Education:  Verbalizes understanding    This plan may be re-evaluated and revised as warranted.        Evaluation Time includes thorough review of current medical information, gathering information on past medical history/social history and prior level of function, completion of standardized testing/informal observation of tasks, assessment of data and education on plan of care and goals.    [x]The admitting diagnosis and active problem list, have been reviewed prior to initiation of this evaluation.    CPT Code: 83178  dysphagia study    ACTIVE PROBLEM LIST:   Patient Active Problem List   Diagnosis    Intervertebral lumbar disc disorder with myelopathy, lumbar region    Essential hypertension    Mixed hyperlipidemia    Acquired hypothyroidism    Age-related osteoporosis without current pathological fracture    Pre-diabetes    Primary osteoarthritis involving multiple joints    Malignant melanoma of torso excluding breast (HCC)    Symptomatic cholelithiasis    Choledocholithiasis    Cholecystitis    Hyponatremia    PERCY (acute kidney injury) (HCC)    Thrombocytopenia (HCC)    Controlled type 2 diabetes mellitus without

## 2025-01-15 NOTE — PROGRESS NOTES
[facility]  [facility]  Progress Note    SUBJECTIVE:  No chief complaint on file.    Patient is resting in bed, she reports she is doing okay  Reports some SOB, states she had to have a breathing treatment last night  No fevers overnight     Review of systems:  As stated above in the chief complaint, otherwise negative.    Medications:  Scheduled Meds:   albumin human 25%  25 g IntraVENous Q8H    [START ON 2025] indocyanine green  5 mg IntraVENous On Call to OR    enoxaparin  40 mg SubCUTAneous Daily    cefTRIAXone (ROCEPHIN) IV  2,000 mg IntraVENous Q24H    metroNIDAZOLE  500 mg IntraVENous Q8H    gabapentin  300 mg Oral QAM    metoprolol succinate  50 mg Oral BID    atorvastatin  10 mg Oral Daily    sodium chloride flush  5-40 mL IntraVENous 2 times per day     Continuous Infusions:   dextrose 5 % and 0.9 % NaCl 50 mL/hr at 01/15/25 1038    sodium chloride Stopped (25 1325)     PRN Meds:ipratropium 0.5 mg-albuterol 2.5 mg, white petrolatum, HYDROcodone-acetaminophen, sodium chloride flush, sodium chloride, ondansetron **OR** ondansetron, polyethylene glycol, acetaminophen **OR** acetaminophen    OBJECTIVE:  BP (!) 158/69   Pulse 72   Temp 99 °F (37.2 °C) (Oral)   Resp 18   Ht 1.549 m (5' 1\")   Wt 76.2 kg (168 lb)   SpO2 94%   BMI 31.74 kg/m²   Temp  Av.8 °F (37.1 °C)  Min: 98 °F (36.7 °C)  Max: 99.1 °F (37.3 °C)  Constitutional: The patient is lying in bed.  She is awake and in no distress.  Son in room.  Skin: Warm and dry. No rashes were noted.   HEENT: Round and reactive pupils.  Moist mucous membranes.  No ulcerations or thrush.  Neck: Supple to movements.   Chest: Wheezes noted.   Cardiovascular: Heart sounds rhythmic and regular.  Abdomen: Positive bowel sounds to auscultation.  Tender to palpation RUQ. No masses felt.  Extremities: No edema.  Lines: Peripheral.    Laboratory and Tests:  Lab Results   Component Value Date    .0 (H) 2025

## 2025-01-15 NOTE — PROGRESS NOTES
Patient recently ambulated to bathroom and is now having worse, audible wheezing.   O2 SAT on room air is 96%  Call placed to on call NP and orders given for PRN Duoneb nebulizers

## 2025-01-15 NOTE — PROGRESS NOTES
[facility] Hospitalist   Progress Note    Admitting Date and Time: 1/10/2025 11:10 AM  Admit Dx: Abdominal pain, right upper quadrant [R10.11]  Hypochloremia [E87.8]  Cholecystitis [K81.9]  Hyponatremia [E87.1]  PERCY (acute kidney injury) (HCC) [N17.9]    Subjective:    Patient was admitted with Abdominal pain, right upper quadrant [R10.11]  Hypochloremia [E87.8]  Cholecystitis [K81.9]  Hyponatremia [E87.1]  PERCY (acute kidney injury) (HCC) [N17.9]. Patient   denies fever, chills, cp, sob, n/v.     albumin human 25%  25 g IntraVENous Q8H    [START ON 1/17/2025] indocyanine green  5 mg IntraVENous On Call to OR    enoxaparin  40 mg SubCUTAneous Daily    cefTRIAXone (ROCEPHIN) IV  2,000 mg IntraVENous Q24H    metroNIDAZOLE  500 mg IntraVENous Q8H    gabapentin  300 mg Oral QAM    metoprolol succinate  50 mg Oral BID    atorvastatin  10 mg Oral Daily    sodium chloride flush  5-40 mL IntraVENous 2 times per day     ipratropium 0.5 mg-albuterol 2.5 mg, 1 Dose, Q4H PRN  white petrolatum, , PRN  HYDROcodone-acetaminophen, 1 tablet, Daily PRN  sodium chloride flush, 5-40 mL, PRN  sodium chloride, , PRN  ondansetron, 4 mg, Q8H PRN   Or  ondansetron, 4 mg, Q6H PRN  polyethylene glycol, 17 g, Daily PRN  acetaminophen, 650 mg, Q6H PRN   Or  acetaminophen, 650 mg, Q6H PRN         Objective:    BP (!) 158/69   Pulse 72   Temp 99 °F (37.2 °C) (Oral)   Resp 18   Ht 1.549 m (5' 1\")   Wt 76.2 kg (168 lb)   SpO2 94%   BMI 31.74 kg/m²   Skin: warm and dry, no rash or erythema  Pulmonary/Chest: clear to auscultation bilaterally- no wheezes, rales or rhonchi, normal air movement, no respiratory distress  Cardiovascular: rhythm reg at rate of 76  Abdomen: soft, non-tender, non-distended, normal bowel sounds, no masses or organomegaly  Extremities: no cyanosis, no clubbing, and no edema      Recent Labs     01/13/25  1520 01/14/25  0827 01/15/25  0646   * 131*  132 132   K 3.3* 3.4*  3.2* 3.9   CL 99 99  99

## 2025-01-15 NOTE — PROGRESS NOTES
Department of Internal Medicine  Nephrology Attending Progress Note      Events reviewed.     SUBJECTIVE: We are following Ms. Jozef Qiu for PERCY and hyponatremia. Patient reports no new complaints today.    PHYSICAL EXAM:      Vitals:    VITALS:  BP (!) 158/69   Pulse 72   Temp 99 °F (37.2 °C) (Oral)   Resp 18   Ht 1.549 m (5' 1\")   Wt 76.2 kg (168 lb)   SpO2 94%   BMI 31.74 kg/m²   24HR INTAKE/OUTPUT:    Intake/Output Summary (Last 24 hours) at 1/15/2025 0902  Last data filed at 1/15/2025 0617  Gross per 24 hour   Intake 4886.55 ml   Output 200 ml   Net 4686.55 ml       Constitutional: Patient is awake, alert in no distress  HEENT: Pupils are equal reactive  Respiratory: Lungs are clear  Cardiovascular/Edema: Heart sounds are regular  Gastrointestinal: Abdomen soft  Neurologic: Nonfocal  Skin: No lesions  Other: No edema      Scheduled Meds:   [START ON 1/17/2025] indocyanine green  5 mg IntraVENous On Call to OR    enoxaparin  40 mg SubCUTAneous Daily    cefTRIAXone (ROCEPHIN) IV  2,000 mg IntraVENous Q24H    metroNIDAZOLE  500 mg IntraVENous Q8H    gabapentin  300 mg Oral QAM    metoprolol succinate  50 mg Oral BID    atorvastatin  10 mg Oral Daily    sodium chloride flush  5-40 mL IntraVENous 2 times per day     Continuous Infusions:   dextrose 5 % and 0.9 % NaCl Stopped (01/15/25 0617)    sodium chloride Stopped (01/11/25 1325)     PRN Meds:.ipratropium 0.5 mg-albuterol 2.5 mg, HYDROcodone-acetaminophen, sodium chloride flush, sodium chloride, ondansetron **OR** ondansetron, polyethylene glycol, acetaminophen **OR** acetaminophen      DATA:    CBC:   Lab Results   Component Value Date/Time    WBC 5.3 01/15/2025 06:46 AM    RBC 2.89 01/15/2025 06:46 AM    HGB 7.8 01/15/2025 06:46 AM    HCT 24.5 01/15/2025 06:46 AM    MCV 84.8 01/15/2025 06:46 AM    MCH 27.0 01/15/2025 06:46 AM    MCHC 31.8 01/15/2025 06:46 AM    RDW 14.1 01/15/2025 06:46 AM     01/11/2025 05:57 AM    MPV 11.1 01/15/2025  06:46 AM     CMP:    Lab Results   Component Value Date/Time     01/15/2025 06:46 AM    K 3.9 01/15/2025 06:46 AM     01/15/2025 06:46 AM    CO2 25 01/15/2025 06:46 AM    BUN 6 01/15/2025 06:46 AM    CREATININE 0.6 01/15/2025 06:46 AM    CREATININE 1.0 03/06/2019 12:00 AM    GFRAA >60 09/14/2022 10:35 AM    LABGLOM 89 01/15/2025 06:46 AM    LABGLOM >60 02/23/2024 10:05 AM    GLUCOSE 92 01/15/2025 06:46 AM    CALCIUM 7.9 01/15/2025 06:46 AM    BILITOT 0.3 01/15/2025 06:46 AM    ALKPHOS 259 01/15/2025 06:46 AM    AST 16 01/15/2025 06:46 AM    ALT 11 01/15/2025 06:46 AM     Magnesium:    Lab Results   Component Value Date/Time    MG 1.7 01/14/2025 08:27 AM     Phosphorus:  No results found for: \"PHOS\"    Radiology Review:      US GALLBLADDER RUQ [[id number] 1/10/2025]     IMPRESSION:  Cholelithiasis with mild gallbladder wall thickening and positive sonographic  Macdonald sign, concerning for acute cholecystitis.    BRIEF SUMMARY OF INITIAL CONSULT:    Briefly Mrs. Haskins is a 84-year-old female with history of HTN, type II DM, metastatic melanoma chemotherapy at [facility], COPD, cholelithiasis and choledocho lithiasis who had recent ERCP with removal of stone, hypothyroidism, hyperlipidemia, DJD, who was admitted on 1/10/2025 after he presented to the ER with generalized weakness and abdominal pain, gallbladder ultrasound showed cholelithiasis with mild gallbladder wall thickening with positive sonographic Macdonald sign concerning for acute cholecystitis, on admission she was found to have hyponatremia with a sodium level of 119 mEq/L and with elevated creatinine of 2.7 mg/dL, reasons for this consultation. Her medications prior to admission included amiloride-HCTZ, losartan. Her baseline creatinine is 0.9-1 mg/dL.      Problems resolved:  PERCY stage II, volume responsive PERCY (poor intake, diuretics) in the setting of ARB and NSAIDs administration    IMPRESSION/RECOMMENDATIONS:      Hypotonic hyponatremia,

## 2025-01-16 ENCOUNTER — APPOINTMENT (OUTPATIENT)
Dept: GENERAL RADIOLOGY | Age: 85
DRG: 853 | End: 2025-01-16
Payer: MEDICARE

## 2025-01-16 LAB
ANION GAP SERPL CALCULATED.3IONS-SCNC: 8 MMOL/L (ref 7–16)
B PARAP IS1001 DNA NPH QL NAA+NON-PROBE: NOT DETECTED
B PERT DNA SPEC QL NAA+PROBE: NOT DETECTED
BASOPHILS # BLD: 0.01 K/UL (ref 0–0.2)
BASOPHILS NFR BLD: 0 % (ref 0–2)
BUN SERPL-MCNC: 4 MG/DL (ref 6–23)
C PNEUM DNA NPH QL NAA+NON-PROBE: NOT DETECTED
CALCIUM SERPL-MCNC: 8.1 MG/DL (ref 8.6–10.2)
CHLORIDE SERPL-SCNC: 100 MMOL/L (ref 98–107)
CO2 SERPL-SCNC: 24 MMOL/L (ref 22–29)
CREAT SERPL-MCNC: 0.6 MG/DL (ref 0.5–1)
EOSINOPHIL # BLD: 0.12 K/UL (ref 0.05–0.5)
EOSINOPHILS RELATIVE PERCENT: 2 % (ref 0–6)
ERYTHROCYTE [DISTWIDTH] IN BLOOD BY AUTOMATED COUNT: 14.2 % (ref 11.5–15)
FLUAV H1 2009 PAN RNA NPH NAA+NON-PROBE: DETECTED
FLUBV RNA NPH QL NAA+NON-PROBE: NOT DETECTED
GFR, ESTIMATED: >90 ML/MIN/1.73M2
GLUCOSE SERPL-MCNC: 90 MG/DL (ref 74–99)
HADV DNA NPH QL NAA+NON-PROBE: NOT DETECTED
HCOV 229E RNA NPH QL NAA+NON-PROBE: NOT DETECTED
HCOV HKU1 RNA NPH QL NAA+NON-PROBE: NOT DETECTED
HCOV NL63 RNA NPH QL NAA+NON-PROBE: NOT DETECTED
HCOV OC43 RNA NPH QL NAA+NON-PROBE: NOT DETECTED
HCT VFR BLD AUTO: 24.7 % (ref 34–48)
HGB BLD-MCNC: 7.8 G/DL (ref 11.5–15.5)
HMPV RNA NPH QL NAA+NON-PROBE: NOT DETECTED
HPIV1 RNA NPH QL NAA+NON-PROBE: NOT DETECTED
HPIV2 RNA NPH QL NAA+NON-PROBE: NOT DETECTED
HPIV3 RNA NPH QL NAA+NON-PROBE: NOT DETECTED
HPIV4 RNA NPH QL NAA+NON-PROBE: NOT DETECTED
IMM GRANULOCYTES # BLD AUTO: 0.16 K/UL (ref 0–0.58)
IMM GRANULOCYTES NFR BLD: 3 % (ref 0–5)
LYMPHOCYTES NFR BLD: 0.92 K/UL (ref 1.5–4)
LYMPHOCYTES RELATIVE PERCENT: 18 % (ref 20–42)
M PNEUMO DNA NPH QL NAA+NON-PROBE: NOT DETECTED
MCH RBC QN AUTO: 27.3 PG (ref 26–35)
MCHC RBC AUTO-ENTMCNC: 31.6 G/DL (ref 32–34.5)
MCV RBC AUTO: 86.4 FL (ref 80–99.9)
MONOCYTES NFR BLD: 0.32 K/UL (ref 0.1–0.95)
MONOCYTES NFR BLD: 6 % (ref 2–12)
NEUTROPHILS NFR BLD: 71 % (ref 43–80)
NEUTS SEG NFR BLD: 3.66 K/UL (ref 1.8–7.3)
PLATELET, FLUORESCENCE: 122 K/UL (ref 130–450)
PMV BLD AUTO: 11.1 FL (ref 7–12)
POTASSIUM SERPL-SCNC: 3.5 MMOL/L (ref 3.5–5)
RBC # BLD AUTO: 2.86 M/UL (ref 3.5–5.5)
RSV RNA NPH QL NAA+NON-PROBE: NOT DETECTED
RV+EV RNA NPH QL NAA+NON-PROBE: NOT DETECTED
SARS-COV-2 RNA NPH QL NAA+NON-PROBE: NOT DETECTED
SODIUM SERPL-SCNC: 132 MMOL/L (ref 132–146)
SPECIMEN DESCRIPTION: ABNORMAL
WBC OTHER # BLD: 5.2 K/UL (ref 4.5–11.5)

## 2025-01-16 PROCEDURE — 6370000000 HC RX 637 (ALT 250 FOR IP)

## 2025-01-16 PROCEDURE — 6370000000 HC RX 637 (ALT 250 FOR IP): Performed by: SPECIALIST

## 2025-01-16 PROCEDURE — 6360000002 HC RX W HCPCS: Performed by: STUDENT IN AN ORGANIZED HEALTH CARE EDUCATION/TRAINING PROGRAM

## 2025-01-16 PROCEDURE — 36415 COLL VENOUS BLD VENIPUNCTURE: CPT

## 2025-01-16 PROCEDURE — 6360000002 HC RX W HCPCS

## 2025-01-16 PROCEDURE — 2500000003 HC RX 250 WO HCPCS: Performed by: INTERNAL MEDICINE

## 2025-01-16 PROCEDURE — 2500000003 HC RX 250 WO HCPCS: Performed by: SPECIALIST

## 2025-01-16 PROCEDURE — 94664 DEMO&/EVAL PT USE INHALER: CPT

## 2025-01-16 PROCEDURE — 6360000002 HC RX W HCPCS: Performed by: SPECIALIST

## 2025-01-16 PROCEDURE — 6370000000 HC RX 637 (ALT 250 FOR IP): Performed by: STUDENT IN AN ORGANIZED HEALTH CARE EDUCATION/TRAINING PROGRAM

## 2025-01-16 PROCEDURE — 6360000002 HC RX W HCPCS: Performed by: SURGERY

## 2025-01-16 PROCEDURE — 99233 SBSQ HOSP IP/OBS HIGH 50: CPT | Performed by: STUDENT IN AN ORGANIZED HEALTH CARE EDUCATION/TRAINING PROGRAM

## 2025-01-16 PROCEDURE — 94640 AIRWAY INHALATION TREATMENT: CPT

## 2025-01-16 PROCEDURE — 1200000000 HC SEMI PRIVATE

## 2025-01-16 PROCEDURE — 80048 BASIC METABOLIC PNL TOTAL CA: CPT

## 2025-01-16 PROCEDURE — 6370000000 HC RX 637 (ALT 250 FOR IP): Performed by: INTERNAL MEDICINE

## 2025-01-16 PROCEDURE — 71045 X-RAY EXAM CHEST 1 VIEW: CPT

## 2025-01-16 PROCEDURE — 85025 COMPLETE CBC W/AUTO DIFF WBC: CPT

## 2025-01-16 PROCEDURE — 6360000002 HC RX W HCPCS: Performed by: INTERNAL MEDICINE

## 2025-01-16 PROCEDURE — 0202U NFCT DS 22 TRGT SARS-COV-2: CPT

## 2025-01-16 PROCEDURE — P9047 ALBUMIN (HUMAN), 25%, 50ML: HCPCS

## 2025-01-16 RX ORDER — IPRATROPIUM BROMIDE AND ALBUTEROL SULFATE 2.5; .5 MG/3ML; MG/3ML
1 SOLUTION RESPIRATORY (INHALATION)
Status: DISCONTINUED | OUTPATIENT
Start: 2025-01-16 | End: 2025-01-18

## 2025-01-16 RX ORDER — OSELTAMIVIR PHOSPHATE 75 MG/1
75 CAPSULE ORAL 2 TIMES DAILY
Status: DISCONTINUED | OUTPATIENT
Start: 2025-01-16 | End: 2025-01-18 | Stop reason: HOSPADM

## 2025-01-16 RX ORDER — METHYLPREDNISOLONE SODIUM SUCCINATE 40 MG/ML
40 INJECTION INTRAMUSCULAR; INTRAVENOUS EVERY 12 HOURS
Status: DISCONTINUED | OUTPATIENT
Start: 2025-01-16 | End: 2025-01-18 | Stop reason: HOSPADM

## 2025-01-16 RX ORDER — FOLIC ACID 1 MG/1
1 TABLET ORAL DAILY
Status: DISCONTINUED | OUTPATIENT
Start: 2025-01-16 | End: 2025-01-18 | Stop reason: HOSPADM

## 2025-01-16 RX ORDER — FUROSEMIDE 10 MG/ML
20 INJECTION INTRAMUSCULAR; INTRAVENOUS ONCE
Status: COMPLETED | OUTPATIENT
Start: 2025-01-16 | End: 2025-01-16

## 2025-01-16 RX ORDER — IPRATROPIUM BROMIDE AND ALBUTEROL SULFATE 2.5; .5 MG/3ML; MG/3ML
1 SOLUTION RESPIRATORY (INHALATION)
Status: DISCONTINUED | OUTPATIENT
Start: 2025-01-16 | End: 2025-01-16

## 2025-01-16 RX ADMIN — SODIUM CHLORIDE, PRESERVATIVE FREE 10 ML: 5 INJECTION INTRAVENOUS at 08:05

## 2025-01-16 RX ADMIN — PETROLATUM: 420 OINTMENT TOPICAL at 08:06

## 2025-01-16 RX ADMIN — METRONIDAZOLE 500 MG: 500 INJECTION, SOLUTION INTRAVENOUS at 22:58

## 2025-01-16 RX ADMIN — METOPROLOL SUCCINATE 50 MG: 50 TABLET, EXTENDED RELEASE ORAL at 20:08

## 2025-01-16 RX ADMIN — IPRATROPIUM BROMIDE AND ALBUTEROL SULFATE 1 DOSE: 2.5; .5 SOLUTION RESPIRATORY (INHALATION) at 15:43

## 2025-01-16 RX ADMIN — OSELTAMIVIR PHOSPHATE 75 MG: 75 CAPSULE ORAL at 20:07

## 2025-01-16 RX ADMIN — SODIUM CHLORIDE, PRESERVATIVE FREE 10 ML: 5 INJECTION INTRAVENOUS at 20:08

## 2025-01-16 RX ADMIN — FUROSEMIDE 20 MG: 10 INJECTION, SOLUTION INTRAMUSCULAR; INTRAVENOUS at 13:21

## 2025-01-16 RX ADMIN — METRONIDAZOLE 500 MG: 500 INJECTION, SOLUTION INTRAVENOUS at 14:47

## 2025-01-16 RX ADMIN — METHYLPREDNISOLONE SODIUM SUCCINATE 40 MG: 40 INJECTION INTRAMUSCULAR; INTRAVENOUS at 13:21

## 2025-01-16 RX ADMIN — ATORVASTATIN CALCIUM 10 MG: 10 TABLET, FILM COATED ORAL at 08:05

## 2025-01-16 RX ADMIN — METOPROLOL SUCCINATE 50 MG: 50 TABLET, EXTENDED RELEASE ORAL at 08:05

## 2025-01-16 RX ADMIN — ALBUMIN (HUMAN) 25 G: 0.25 INJECTION, SOLUTION INTRAVENOUS at 01:25

## 2025-01-16 RX ADMIN — GABAPENTIN 300 MG: 300 CAPSULE ORAL at 08:05

## 2025-01-16 RX ADMIN — METRONIDAZOLE 500 MG: 500 INJECTION, SOLUTION INTRAVENOUS at 06:22

## 2025-01-16 RX ADMIN — IPRATROPIUM BROMIDE AND ALBUTEROL SULFATE 1 DOSE: 2.5; .5 SOLUTION RESPIRATORY (INHALATION) at 13:06

## 2025-01-16 RX ADMIN — ENOXAPARIN SODIUM 40 MG: 100 INJECTION SUBCUTANEOUS at 08:05

## 2025-01-16 RX ADMIN — IPRATROPIUM BROMIDE AND ALBUTEROL SULFATE 1 DOSE: 2.5; .5 SOLUTION RESPIRATORY (INHALATION) at 19:50

## 2025-01-16 RX ADMIN — FOLIC ACID 1 MG: 1 TABLET ORAL at 13:58

## 2025-01-16 RX ADMIN — ALBUMIN (HUMAN) 25 G: 0.25 INJECTION, SOLUTION INTRAVENOUS at 08:04

## 2025-01-16 RX ADMIN — OSELTAMIVIR PHOSPHATE 75 MG: 75 CAPSULE ORAL at 14:46

## 2025-01-16 RX ADMIN — METHYLPREDNISOLONE SODIUM SUCCINATE 40 MG: 40 INJECTION INTRAMUSCULAR; INTRAVENOUS at 23:01

## 2025-01-16 RX ADMIN — WATER 2000 MG: 1 INJECTION INTRAMUSCULAR; INTRAVENOUS; SUBCUTANEOUS at 13:21

## 2025-01-16 NOTE — PROGRESS NOTES
[facility]  [facility]  Progress Note    SUBJECTIVE:  No chief complaint on file.    Patient is resting in bed, she reports she is doing okay  Reports some SOB, states she had to have a breathing treatment last night  No fevers overnight     Review of systems:  As stated above in the chief complaint, otherwise negative.    Medications:  Scheduled Meds:   ipratropium 0.5 mg-albuterol 2.5 mg  1 Dose Inhalation Q4H WA RT    methylPREDNISolone  40 mg IntraVENous Q12H    folic acid  1 mg Oral Daily    [START ON 2025] indocyanine green  5 mg IntraVENous On Call to OR    enoxaparin  40 mg SubCUTAneous Daily    cefTRIAXone (ROCEPHIN) IV  2,000 mg IntraVENous Q24H    metroNIDAZOLE  500 mg IntraVENous Q8H    gabapentin  300 mg Oral QAM    metoprolol succinate  50 mg Oral BID    atorvastatin  10 mg Oral Daily    sodium chloride flush  5-40 mL IntraVENous 2 times per day     Continuous Infusions:   sodium chloride Stopped (25 1325)     PRN Meds:ipratropium 0.5 mg-albuterol 2.5 mg, white petrolatum, HYDROcodone-acetaminophen, sodium chloride flush, sodium chloride, ondansetron **OR** ondansetron, polyethylene glycol, acetaminophen **OR** acetaminophen    OBJECTIVE:  BP (!) 146/61   Pulse 69   Temp 99.1 °F (37.3 °C) (Oral)   Resp 18   Ht 1.549 m (5' 1\")   Wt 76.1 kg (167 lb 12.8 oz)   SpO2 92%   BMI 31.71 kg/m²   Temp  Av °F (37.2 °C)  Min: 98.8 °F (37.1 °C)  Max: 99.1 °F (37.3 °C)  Constitutional: The patient is lying in bed.  She is awake and in no distress.  Son in room.  Skin: Warm and dry. No rashes were noted.   HEENT: Round and reactive pupils.  Moist mucous membranes.  No ulcerations or thrush.  Neck: Supple to movements.   Chest: Wheezes noted.   Cardiovascular: Heart sounds rhythmic and regular.  Abdomen: Positive bowel sounds to auscultation.  Tender to palpation RUQ. No masses felt.  Extremities: No edema.  Lines: Peripheral.    Laboratory and Tests:  Lab Results

## 2025-01-16 NOTE — PROGRESS NOTES
[facility] Hospitalist Progress Note    Admitting Date and Time: 1/10/2025 11:10 AM  Admit Dx: Abdominal pain, right upper quadrant [R10.11]  Hypochloremia [E87.8]  Cholecystitis [K81.9]  Hyponatremia [E87.1]  PERCY (acute kidney injury) (HCC) [N17.9]    Subjective:  Patient is being followed for Abdominal pain, right upper quadrant [R10.11]  Hypochloremia [E87.8]  Cholecystitis [K81.9]  Hyponatremia [E87.1]  PERCY (acute kidney injury) (HCC) [N17.9]       Patient was seen and examined.     ROS: denies fever, chills, cp, sob, n/v, HA unless stated above.      ipratropium 0.5 mg-albuterol 2.5 mg  1 Dose Inhalation Q4H WA RT    [START ON 1/17/2025] indocyanine green  5 mg IntraVENous On Call to OR    enoxaparin  40 mg SubCUTAneous Daily    cefTRIAXone (ROCEPHIN) IV  2,000 mg IntraVENous Q24H    metroNIDAZOLE  500 mg IntraVENous Q8H    gabapentin  300 mg Oral QAM    metoprolol succinate  50 mg Oral BID    atorvastatin  10 mg Oral Daily    sodium chloride flush  5-40 mL IntraVENous 2 times per day     ipratropium 0.5 mg-albuterol 2.5 mg, 1 Dose, Q4H PRN  white petrolatum, , PRN  HYDROcodone-acetaminophen, 1 tablet, Daily PRN  sodium chloride flush, 5-40 mL, PRN  sodium chloride, , PRN  ondansetron, 4 mg, Q8H PRN   Or  ondansetron, 4 mg, Q6H PRN  polyethylene glycol, 17 g, Daily PRN  acetaminophen, 650 mg, Q6H PRN   Or  acetaminophen, 650 mg, Q6H PRN         Objective:    BP (!) 146/61   Pulse 69   Temp 99.1 °F (37.3 °C) (Oral)   Resp 18   Ht 1.549 m (5' 1\")   Wt 76.1 kg (167 lb 12.8 oz)   SpO2 92%   BMI 31.71 kg/m²     General Appearance: alert and oriented to person, place and time and in no acute distress  Skin: warm and dry  Head: normocephalic and atraumatic  Eyes: pupils equal, round, and reactive to light, extraocular eye movements intact, conjunctivae normal  Neck: neck supple and non tender without mass   Pulmonary/Chest: clear to auscultation bilaterally- no wheezes, rales or rhonchi, normal air

## 2025-01-16 NOTE — PROGRESS NOTES
Dr. Pineda and surgery notified of + Flu A  Electronically signed by Alaina Ochoa RN on 1/16/2025 at 2:26 PM

## 2025-01-16 NOTE — PROGRESS NOTES
01/15/2025 06:46 AM     01/11/2025 05:57 AM    MPV 11.1 01/15/2025 06:46 AM     CMP:    Lab Results   Component Value Date/Time     01/15/2025 06:46 AM    K 3.9 01/15/2025 06:46 AM     01/15/2025 06:46 AM    CO2 25 01/15/2025 06:46 AM    BUN 6 01/15/2025 06:46 AM    CREATININE 0.6 01/15/2025 06:46 AM    CREATININE 1.0 03/06/2019 12:00 AM    GFRAA >60 09/14/2022 10:35 AM    LABGLOM 89 01/15/2025 06:46 AM    LABGLOM >60 02/23/2024 10:05 AM    GLUCOSE 92 01/15/2025 06:46 AM    CALCIUM 7.9 01/15/2025 06:46 AM    BILITOT 0.3 01/15/2025 06:46 AM    ALKPHOS 259 01/15/2025 06:46 AM    AST 16 01/15/2025 06:46 AM    ALT 11 01/15/2025 06:46 AM     Magnesium:    Lab Results   Component Value Date/Time    MG 1.7 01/14/2025 08:27 AM     Phosphorus:  No results found for: \"PHOS\"    Radiology Review:      US GALLBLADDER RUQ [[id number] 1/10/2025]     IMPRESSION:  Cholelithiasis with mild gallbladder wall thickening and positive sonographic  Macdonald sign, concerning for acute cholecystitis.    BRIEF SUMMARY OF INITIAL CONSULT:    Briefly Mrs. Haskins is a 84-year-old female with history of HTN, type II DM, metastatic melanoma chemotherapy at [facility], COPD, cholelithiasis and choledocho lithiasis who had recent ERCP with removal of stone, hypothyroidism, hyperlipidemia, DJD, who was admitted on 1/10/2025 after he presented to the ER with generalized weakness and abdominal pain, gallbladder ultrasound showed cholelithiasis with mild gallbladder wall thickening with positive sonographic Macdonald sign concerning for acute cholecystitis, on admission she was found to have hyponatremia with a sodium level of 119 mEq/L and with elevated creatinine of 2.7 mg/dL, reasons for this consultation. Her medications prior to admission included amiloride-HCTZ, losartan. Her baseline creatinine is 0.9-1 mg/dL.      Problems resolved:  PERCY stage II, volume responsive PERCY (poor intake, diuretics) in the setting of ARB and NSAIDs

## 2025-01-16 NOTE — PATIENT CARE CONFERENCE
[facility] Quality Flow/Interdisciplinary Rounds Progress Note        Quality Flow Rounds held on January 16, 2025    Disciplines Attending:  Bedside Nurse, , , and Nursing Unit Leadership    Colleen Qiu was admitted on 1/10/2025 11:10 AM    Anticipated Discharge Date:       Disposition:    Aneesh Score:  Aneesh Scale Score: 19    [facility] RISK OF UNPLANNED READMISSION 2.0             14.3 Total Score        Discussed patient goal for the day, patient clinical progression, and barriers to discharge.  The following Goal(s) of the Day/Commitment(s) have been identified:  Diagnostics - Report Results      Alaina Ochoa RN  January 16, 2025

## 2025-01-16 NOTE — PROGRESS NOTES
GENERAL SURGERY  DAILY PROGRESS NOTE  1/16/2025    Subjective:  Doing well. Denies any pain this AM. Tolerating regular diet. No nausea or emesis.     I/O last 3 completed shifts:  In: 5623.4 [I.V.:4227.3; IV Piggyback:1396.1]  Out: 200 [Urine:200]  No intake/output data recorded.      Objective:  BP (!) 141/55   Pulse 72   Temp 98.8 °F (37.1 °C) (Oral)   Resp 18   Ht 1.549 m (5' 1\")   Wt 76.1 kg (167 lb 12.8 oz)   SpO2 97%   BMI 31.71 kg/m²     GENERAL:  Laying in bed, awake, alert, cooperative, no apparent distress  HEAD: No gross abnormalities   LUNGS:  No increased work of breathing  CARDIOVASCULAR:  RR  ABDOMEN:  Soft, non tender this AM   SKIN: Warm and dry      Lab Results   Component Value Date    WBC 5.3 01/15/2025    HGB 7.8 (L) 01/15/2025    HCT 24.5 (L) 01/15/2025    MCV 84.8 01/15/2025     (L) 01/11/2025     Lab Results   Component Value Date     01/15/2025    K 3.9 01/15/2025     01/15/2025    CO2 25 01/15/2025    BUN 6 01/15/2025    CREATININE 0.6 01/15/2025    GLUCOSE 92 01/15/2025    CALCIUM 7.9 (L) 01/15/2025    BILITOT 0.3 01/15/2025    ALKPHOS 259 (H) 01/15/2025    AST 16 01/15/2025    ALT 11 01/15/2025    LABGLOM 89 01/15/2025    GFRAA >60 09/14/2022         Assessment/Plan:  84 y.o. female RUQ pain 2/2 acute cholecystitis    Hyponatremia resolved   Continue Abx per ID, bcx w serratia   plan for cholecystectomy 1/17  NPOMN    Electronically signed by Dona Salamanca MD on 1/16/2025 at 5:06 AM        Attending Physician Statement:    Chief Complaint: Cholecystitis    I have examined the patient and performed the key aspects of physical exam, reviewed the record (including all new notes, pertinent radiology images which are independently reviewed and laboratory findings), and discussed the case with the surgical team.  I agree with the assessment and plan with the following additions, corrections, and changes. 14pt review of symptoms completed and negative except as

## 2025-01-17 ENCOUNTER — ANESTHESIA EVENT (OUTPATIENT)
Dept: OPERATING ROOM | Age: 85
End: 2025-01-17
Payer: MEDICARE

## 2025-01-17 ENCOUNTER — ANESTHESIA (OUTPATIENT)
Dept: OPERATING ROOM | Age: 85
End: 2025-01-17
Payer: MEDICARE

## 2025-01-17 PROBLEM — E43 SEVERE PROTEIN-CALORIE MALNUTRITION (HCC): Status: ACTIVE | Noted: 2025-01-17

## 2025-01-17 LAB
ANION GAP SERPL CALCULATED.3IONS-SCNC: 13 MMOL/L (ref 7–16)
BASOPHILS # BLD: 0 K/UL (ref 0–0.2)
BASOPHILS NFR BLD: 0 % (ref 0–2)
BUN SERPL-MCNC: 10 MG/DL (ref 6–23)
CALCIUM SERPL-MCNC: 8.5 MG/DL (ref 8.6–10.2)
CHLORIDE SERPL-SCNC: 96 MMOL/L (ref 98–107)
CO2 SERPL-SCNC: 24 MMOL/L (ref 22–29)
CREAT SERPL-MCNC: 0.6 MG/DL (ref 0.5–1)
EOSINOPHIL # BLD: 0 K/UL (ref 0.05–0.5)
EOSINOPHILS RELATIVE PERCENT: 0 % (ref 0–6)
ERYTHROCYTE [DISTWIDTH] IN BLOOD BY AUTOMATED COUNT: 14.3 % (ref 11.5–15)
GFR, ESTIMATED: 88 ML/MIN/1.73M2
GLUCOSE SERPL-MCNC: 146 MG/DL (ref 74–99)
HCT VFR BLD AUTO: 29.7 % (ref 34–48)
HGB BLD-MCNC: 9.4 G/DL (ref 11.5–15.5)
LYMPHOCYTES NFR BLD: 0.47 K/UL (ref 1.5–4)
LYMPHOCYTES RELATIVE PERCENT: 9 % (ref 20–42)
MCH RBC QN AUTO: 26.8 PG (ref 26–35)
MCHC RBC AUTO-ENTMCNC: 31.6 G/DL (ref 32–34.5)
MCV RBC AUTO: 84.6 FL (ref 80–99.9)
METAMYELOCYTES ABSOLUTE COUNT: 0.09 K/UL (ref 0–0.12)
METAMYELOCYTES: 2 % (ref 0–1)
MICROORGANISM SPEC CULT: NORMAL
MONOCYTES NFR BLD: 0.05 K/UL (ref 0.1–0.95)
MONOCYTES NFR BLD: 1 % (ref 2–12)
NEUTROPHILS NFR BLD: 89 % (ref 43–80)
NEUTS SEG NFR BLD: 4.79 K/UL (ref 1.8–7.3)
PLATELET # BLD AUTO: 190 K/UL (ref 130–450)
PMV BLD AUTO: 10.4 FL (ref 7–12)
POTASSIUM SERPL-SCNC: 3.4 MMOL/L (ref 3.5–5)
RBC # BLD AUTO: 3.51 M/UL (ref 3.5–5.5)
RBC # BLD: ABNORMAL 10*6/UL
SERVICE CMNT-IMP: NORMAL
SODIUM SERPL-SCNC: 133 MMOL/L (ref 132–146)
SPECIMEN DESCRIPTION: NORMAL
WBC OTHER # BLD: 5.4 K/UL (ref 4.5–11.5)

## 2025-01-17 PROCEDURE — 2709999900 HC NON-CHARGEABLE SUPPLY: Performed by: SURGERY

## 2025-01-17 PROCEDURE — 8E0W4CZ ROBOTIC ASSISTED PROCEDURE OF TRUNK REGION, PERCUTANEOUS ENDOSCOPIC APPROACH: ICD-10-PCS | Performed by: SURGERY

## 2025-01-17 PROCEDURE — 6360000002 HC RX W HCPCS

## 2025-01-17 PROCEDURE — 88304 TISSUE EXAM BY PATHOLOGIST: CPT

## 2025-01-17 PROCEDURE — 6370000000 HC RX 637 (ALT 250 FOR IP)

## 2025-01-17 PROCEDURE — 3700000000 HC ANESTHESIA ATTENDED CARE: Performed by: SURGERY

## 2025-01-17 PROCEDURE — 2500000003 HC RX 250 WO HCPCS: Performed by: SURGERY

## 2025-01-17 PROCEDURE — 7100000001 HC PACU RECOVERY - ADDTL 15 MIN: Performed by: SURGERY

## 2025-01-17 PROCEDURE — 6370000000 HC RX 637 (ALT 250 FOR IP): Performed by: INTERNAL MEDICINE

## 2025-01-17 PROCEDURE — 94640 AIRWAY INHALATION TREATMENT: CPT

## 2025-01-17 PROCEDURE — C1889 IMPLANT/INSERT DEVICE, NOC: HCPCS | Performed by: SURGERY

## 2025-01-17 PROCEDURE — 6360000002 HC RX W HCPCS: Performed by: SPECIALIST

## 2025-01-17 PROCEDURE — 1200000000 HC SEMI PRIVATE

## 2025-01-17 PROCEDURE — 80048 BASIC METABOLIC PNL TOTAL CA: CPT

## 2025-01-17 PROCEDURE — 2500000003 HC RX 250 WO HCPCS: Performed by: INTERNAL MEDICINE

## 2025-01-17 PROCEDURE — 3700000001 HC ADD 15 MINUTES (ANESTHESIA): Performed by: SURGERY

## 2025-01-17 PROCEDURE — 2580000003 HC RX 258

## 2025-01-17 PROCEDURE — 0FT44ZZ RESECTION OF GALLBLADDER, PERCUTANEOUS ENDOSCOPIC APPROACH: ICD-10-PCS | Performed by: SURGERY

## 2025-01-17 PROCEDURE — BF50200 OTHER IMAGING OF BILE DUCTS USING FLUORESCING AGENT, INDOCYANINE GREEN DYE, INTRAOPERATIVE: ICD-10-PCS | Performed by: SURGERY

## 2025-01-17 PROCEDURE — 2500000003 HC RX 250 WO HCPCS: Performed by: SPECIALIST

## 2025-01-17 PROCEDURE — S2900 ROBOTIC SURGICAL SYSTEM: HCPCS | Performed by: SURGERY

## 2025-01-17 PROCEDURE — 3600000009 HC SURGERY ROBOT BASE: Performed by: SURGERY

## 2025-01-17 PROCEDURE — 7100000000 HC PACU RECOVERY - FIRST 15 MIN: Performed by: SURGERY

## 2025-01-17 PROCEDURE — 6360000002 HC RX W HCPCS: Performed by: SURGERY

## 2025-01-17 PROCEDURE — 6370000000 HC RX 637 (ALT 250 FOR IP): Performed by: SPECIALIST

## 2025-01-17 PROCEDURE — 2500000003 HC RX 250 WO HCPCS

## 2025-01-17 PROCEDURE — 3600000019 HC SURGERY ROBOT ADDTL 15MIN: Performed by: SURGERY

## 2025-01-17 PROCEDURE — 6370000000 HC RX 637 (ALT 250 FOR IP): Performed by: STUDENT IN AN ORGANIZED HEALTH CARE EDUCATION/TRAINING PROGRAM

## 2025-01-17 PROCEDURE — 85025 COMPLETE CBC W/AUTO DIFF WBC: CPT

## 2025-01-17 PROCEDURE — 36415 COLL VENOUS BLD VENIPUNCTURE: CPT

## 2025-01-17 DEVICE — HEMOLOK ML 6 CLIPS/CART
Type: IMPLANTABLE DEVICE | Site: ABDOMEN | Status: FUNCTIONAL
Brand: WECK

## 2025-01-17 RX ORDER — ROCURONIUM BROMIDE 10 MG/ML
INJECTION, SOLUTION INTRAVENOUS
Status: DISCONTINUED | OUTPATIENT
Start: 2025-01-17 | End: 2025-01-17 | Stop reason: SDUPTHER

## 2025-01-17 RX ORDER — LIDOCAINE HYDROCHLORIDE 20 MG/ML
INJECTION, SOLUTION EPIDURAL; INFILTRATION; INTRACAUDAL; PERINEURAL
Status: DISCONTINUED | OUTPATIENT
Start: 2025-01-17 | End: 2025-01-17 | Stop reason: SDUPTHER

## 2025-01-17 RX ORDER — PROPOFOL 10 MG/ML
INJECTION, EMULSION INTRAVENOUS
Status: DISCONTINUED | OUTPATIENT
Start: 2025-01-17 | End: 2025-01-17 | Stop reason: SDUPTHER

## 2025-01-17 RX ORDER — DEXAMETHASONE SODIUM PHOSPHATE 4 MG/ML
INJECTION, SOLUTION INTRA-ARTICULAR; INTRALESIONAL; INTRAMUSCULAR; INTRAVENOUS; SOFT TISSUE
Status: DISCONTINUED | OUTPATIENT
Start: 2025-01-17 | End: 2025-01-17 | Stop reason: SDUPTHER

## 2025-01-17 RX ORDER — SODIUM CHLORIDE 9 MG/ML
INJECTION, SOLUTION INTRAVENOUS
Status: DISCONTINUED | OUTPATIENT
Start: 2025-01-17 | End: 2025-01-17 | Stop reason: SDUPTHER

## 2025-01-17 RX ORDER — OXYCODONE HYDROCHLORIDE 5 MG/1
5 TABLET ORAL EVERY 4 HOURS PRN
Status: DISCONTINUED | OUTPATIENT
Start: 2025-01-17 | End: 2025-01-18 | Stop reason: HOSPADM

## 2025-01-17 RX ORDER — FENTANYL CITRATE 50 UG/ML
INJECTION, SOLUTION INTRAMUSCULAR; INTRAVENOUS
Status: DISCONTINUED | OUTPATIENT
Start: 2025-01-17 | End: 2025-01-17 | Stop reason: SDUPTHER

## 2025-01-17 RX ORDER — ONDANSETRON 2 MG/ML
INJECTION INTRAMUSCULAR; INTRAVENOUS
Status: DISCONTINUED | OUTPATIENT
Start: 2025-01-17 | End: 2025-01-17 | Stop reason: SDUPTHER

## 2025-01-17 RX ORDER — AMLODIPINE BESYLATE 5 MG/1
5 TABLET ORAL DAILY
Status: DISCONTINUED | OUTPATIENT
Start: 2025-01-17 | End: 2025-01-18 | Stop reason: HOSPADM

## 2025-01-17 RX ORDER — BUPIVACAINE HYDROCHLORIDE AND EPINEPHRINE 2.5; 5 MG/ML; UG/ML
INJECTION, SOLUTION EPIDURAL; INFILTRATION; INTRACAUDAL; PERINEURAL PRN
Status: DISCONTINUED | OUTPATIENT
Start: 2025-01-17 | End: 2025-01-17 | Stop reason: ALTCHOICE

## 2025-01-17 RX ADMIN — METHYLPREDNISOLONE SODIUM SUCCINATE 40 MG: 40 INJECTION INTRAMUSCULAR; INTRAVENOUS at 15:39

## 2025-01-17 RX ADMIN — IPRATROPIUM BROMIDE AND ALBUTEROL SULFATE 1 DOSE: 2.5; .5 SOLUTION RESPIRATORY (INHALATION) at 08:19

## 2025-01-17 RX ADMIN — AMLODIPINE BESYLATE 5 MG: 5 TABLET ORAL at 09:49

## 2025-01-17 RX ADMIN — LIDOCAINE HYDROCHLORIDE 100 MG: 20 INJECTION, SOLUTION EPIDURAL; INFILTRATION; INTRACAUDAL; PERINEURAL at 13:14

## 2025-01-17 RX ADMIN — OSELTAMIVIR PHOSPHATE 75 MG: 75 CAPSULE ORAL at 08:37

## 2025-01-17 RX ADMIN — METOPROLOL SUCCINATE 50 MG: 50 TABLET, EXTENDED RELEASE ORAL at 20:55

## 2025-01-17 RX ADMIN — OSELTAMIVIR PHOSPHATE 75 MG: 75 CAPSULE ORAL at 20:55

## 2025-01-17 RX ADMIN — FOLIC ACID 1 MG: 1 TABLET ORAL at 08:36

## 2025-01-17 RX ADMIN — IPRATROPIUM BROMIDE AND ALBUTEROL SULFATE 1 DOSE: 2.5; .5 SOLUTION RESPIRATORY (INHALATION) at 16:10

## 2025-01-17 RX ADMIN — SODIUM CHLORIDE, PRESERVATIVE FREE 10 ML: 5 INJECTION INTRAVENOUS at 20:58

## 2025-01-17 RX ADMIN — METOPROLOL SUCCINATE 50 MG: 50 TABLET, EXTENDED RELEASE ORAL at 08:37

## 2025-01-17 RX ADMIN — ATORVASTATIN CALCIUM 10 MG: 10 TABLET, FILM COATED ORAL at 08:41

## 2025-01-17 RX ADMIN — OXYCODONE 5 MG: 5 TABLET ORAL at 15:38

## 2025-01-17 RX ADMIN — SODIUM CHLORIDE, PRESERVATIVE FREE 10 ML: 5 INJECTION INTRAVENOUS at 08:42

## 2025-01-17 RX ADMIN — GABAPENTIN 300 MG: 300 CAPSULE ORAL at 08:36

## 2025-01-17 RX ADMIN — SUGAMMADEX 250 MG: 100 INJECTION, SOLUTION INTRAVENOUS at 14:11

## 2025-01-17 RX ADMIN — ROCURONIUM BROMIDE 40 MG: 10 INJECTION, SOLUTION INTRAVENOUS at 13:14

## 2025-01-17 RX ADMIN — IPRATROPIUM BROMIDE AND ALBUTEROL SULFATE 1 DOSE: 2.5; .5 SOLUTION RESPIRATORY (INHALATION) at 20:00

## 2025-01-17 RX ADMIN — INDOCYANINE GREEN AND WATER 5 MG: KIT at 13:04

## 2025-01-17 RX ADMIN — FENTANYL CITRATE 50 MCG: 50 INJECTION, SOLUTION INTRAMUSCULAR; INTRAVENOUS at 13:14

## 2025-01-17 RX ADMIN — SODIUM CHLORIDE: 9 INJECTION, SOLUTION INTRAVENOUS at 13:05

## 2025-01-17 RX ADMIN — METRONIDAZOLE 500 MG: 500 INJECTION, SOLUTION INTRAVENOUS at 06:12

## 2025-01-17 RX ADMIN — SODIUM CHLORIDE, PRESERVATIVE FREE 10 ML: 5 INJECTION INTRAVENOUS at 15:39

## 2025-01-17 RX ADMIN — PROPOFOL 150 MG: 10 INJECTION, EMULSION INTRAVENOUS at 13:14

## 2025-01-17 RX ADMIN — WATER 2000 MG: 1 INJECTION INTRAMUSCULAR; INTRAVENOUS; SUBCUTANEOUS at 13:20

## 2025-01-17 RX ADMIN — ONDANSETRON 4 MG: 2 INJECTION, SOLUTION INTRAMUSCULAR; INTRAVENOUS at 13:17

## 2025-01-17 RX ADMIN — DEXAMETHASONE SODIUM PHOSPHATE 10 MG: 4 INJECTION, SOLUTION INTRAMUSCULAR; INTRAVENOUS at 13:17

## 2025-01-17 ASSESSMENT — PAIN - FUNCTIONAL ASSESSMENT
PAIN_FUNCTIONAL_ASSESSMENT: ACTIVITIES ARE NOT PREVENTED
PAIN_FUNCTIONAL_ASSESSMENT: 0-10

## 2025-01-17 ASSESSMENT — PAIN DESCRIPTION - DESCRIPTORS: DESCRIPTORS: DISCOMFORT;SORE;TENDER

## 2025-01-17 ASSESSMENT — PAIN SCALES - GENERAL
PAINLEVEL_OUTOF10: 2
PAINLEVEL_OUTOF10: 6
PAINLEVEL_OUTOF10: 5
PAINLEVEL_OUTOF10: 3

## 2025-01-17 ASSESSMENT — PAIN DESCRIPTION - LOCATION
LOCATION: ABDOMEN
LOCATION: ABDOMEN

## 2025-01-17 ASSESSMENT — LIFESTYLE VARIABLES: SMOKING_STATUS: 0

## 2025-01-17 NOTE — PROGRESS NOTES
GENERAL SURGERY  DAILY PROGRESS NOTE  1/17/2025    Subjective:  Awake and alert on rounds. Reporting some abdominal pain this AM. On RA denies any SOB      I/O last 3 completed shifts:  In: 725.9 [I.V.:536.2; IV Piggyback:189.7]  Out: 600 [Urine:600]  No intake/output data recorded.      Objective:  BP (!) 175/66   Pulse 55   Temp 98.2 °F (36.8 °C) (Oral)   Resp 16   Ht 1.549 m (5' 1\")   Wt 76.3 kg (168 lb 3.2 oz)   SpO2 94%   BMI 31.78 kg/m²     GENERAL:  Laying in bed, awake, alert, cooperative, no apparent distress  HEAD: No gross abnormalities   LUNGS:  No increased work of breathing  CARDIOVASCULAR:  RR  ABDOMEN:  Soft, mild RUQ TTP    SKIN: Warm and dry      Lab Results   Component Value Date    WBC 5.2 01/16/2025    HGB 7.8 (L) 01/16/2025    HCT 24.7 (L) 01/16/2025    MCV 86.4 01/16/2025     (L) 01/11/2025     Lab Results   Component Value Date     01/16/2025    K 3.5 01/16/2025     01/16/2025    CO2 24 01/16/2025    BUN 4 (L) 01/16/2025    CREATININE 0.6 01/16/2025    GLUCOSE 90 01/16/2025    CALCIUM 8.1 (L) 01/16/2025    BILITOT 0.3 01/15/2025    ALKPHOS 259 (H) 01/15/2025    AST 16 01/15/2025    ALT 11 01/15/2025    LABGLOM >90 01/16/2025    GFRAA >60 09/14/2022         Assessment/Plan:  84 y.o. female RUQ pain 2/2 acute cholecystitis    Flu A +, on RA, CXR with small effusion.   Hyponatremia resolved   Continue Abx per ID, bcx w serratia   Plan for robotic assisted cholecystectomy today if cleared from a medical standpoint   NPO, IV  Consent signed     DW Dr. Chambers       Electronically signed by Dona Salamanca MD on 1/17/2025 at 7:06 AM

## 2025-01-17 NOTE — DISCHARGE INSTRUCTIONS
A Cholecystectomy is the surgical removal of the gallbladder.      Home Care    You have glue on your incisions-Do not apply lotion, gel, or liquid to wound while the glue is in place.   Place ice on incisions to decrease the pain and bruising.    Diet    You will be started on a clear-liquid diet after surgery and advanced to a regular diet, depending on your progress and tolerance. Your liver will take over the functions of the gallbladder, although some people notice that they have a little more trouble digesting fatty foods, particularly for the first month after surgery. You may notice increased gas or changes in your bowel habits during the first month after surgery. Keep the bowels soft with Metamucil and bran cereal.    Physical Activity    Walk frequently to prevent blood clots in the legs but do not do anything that causes pain in the incisions.   Breath deeply every hour to prevent pneumonia.    Medications    Restart blood thinners in 24 hours if no sign of bleeding  Take Ibuprofen and Tylenol for moderate pain.   Use the perscribed medication only for more severe pain.  Prescriptions will be sent with you.  Use as directed.  When taking pain medications, you may experience dizziness or drowsiness.  Do not drink alcohol or drive when taking these medications  Do not share them    Call  For any of the Following Occurs     Signs of infection, including fever and chills   Redness, swelling, increasing pain, excessive bleeding, or discharge at the incision site   Cough, shortness of breath, chest pain   Increased abdominal pain   Nausea and/or vomiting that does not resolve off narcotics.  Pain, burning, urgency or frequency of urination, or blood in the urine   Pain and/or swelling in your feet, calves, or legs   Dark urine, light stools, or evidence of jaundice (yellowing of the skin or eyes).     In case of an emergency,    CALL 911  Immediately.

## 2025-01-17 NOTE — PROGRESS NOTES
[facility] Quality Flow/Interdisciplinary Rounds Progress Note        Quality Flow Rounds held on January 17, 2025    Disciplines Attending:  Bedside Nurse, , , and Nursing Unit Leadership    Colleen Qiu was admitted on 1/10/2025 11:10 AM    Anticipated Discharge Date:       Disposition:    Aneesh Score:  Aneesh Scale Score: 19    [facility] RISK OF UNPLANNED READMISSION 2.0             14.9 Total Score        Discussed patient goal for the day, patient clinical progression, and barriers to discharge.  The following Goal(s) of the Day/Commitment(s) have been identified:  Diagnostics - Report Results      Jayda Bedolla RN  January 17, 2025

## 2025-01-17 NOTE — OP NOTE
Operative Note     Colleen Haskins Lazarus  1940  [id number]       Pre-op Diagnosis:   Symptomatic cholelithiasis [K80.20]     Post-op Diagnosis List:  Symptomatic cholelithiasis [K80.20]       Procedure:  Procedure(s):  LAPAROSCOPIC ROBOTIC XI ASSISTED CHOLECYSTECTOMY WITH ICG CHOLANGIOGRAPHY     Surgeon:  Kiet Chambers MD    Staff:  Scrub Person First: Maggie Lawrence     Anesthesia:  General     Findings: chronic cholecystitis     EBL: less than 50      Drains: None     Specimens:  ID Type Source Tests Collected by Time Destination   A : GALLBLADDER Tissue Gallbladder SURGICAL PATHOLOGY Kiet Chambers MD 1/17/2025 1330          Complications:  * No complications entered in OR log *     Disposition of Patient:  Tolerated procedure well     CDC Wound Closure Status:  primary     Surgical Course:  Patient is a 84 y.o. female who was diagnosed with the above. Risks, benefits, and alternatives of the procedure, including bleeding, infection, bile leak, CBD injury, possibility for open procedure/subtotal cholecystectomy, possibility for future procedures were discussed with the patient. The planned procedure was agreed to and informed consent was obtained.          After informed consent was obtained patient brought to operating table placed in supine position.  General anesthesia was induced.  Patient is prepped and draped in usual sterile fashion.  Time-out was performed identifying correct patient procedure.  SCDs were on and functional.  Patient received appropriate perioperative antibiotics.  Left upper quadrant 8mm incision was made and Veress needle was inserted.  Pneumoperitoneum was then induced which the patient tolerated. 8mm robotic optical entry port was used to visualize the abdominal wall layers with entry into the abdomen.  8mm midline supraumblical incision was made and robotic 8mm trocar was placed under direct visualization.   Two 8mm incisions were made in the RUQ.  8mm trocars were

## 2025-01-17 NOTE — PROGRESS NOTES
Confirmed with Dr. Pineda the patient is cleared for surgery per medical. Surgical resident notified. Electronically signed by Jayda Bedolla RN on 1/17/2025 at 7:38 AM

## 2025-01-17 NOTE — PROGRESS NOTES
[facility] Hospitalist Progress Note    Admitting Date and Time: 1/10/2025 11:10 AM  Admit Dx: Abdominal pain, right upper quadrant [R10.11]  Hypochloremia [E87.8]  Cholecystitis [K81.9]  Hyponatremia [E87.1]  PERCY (acute kidney injury) (HCC) [N17.9]    Subjective:  Patient is being followed for Abdominal pain, right upper quadrant [R10.11]  Hypochloremia [E87.8]  Cholecystitis [K81.9]  Hyponatremia [E87.1]  PERCY (acute kidney injury) (HCC) [N17.9]       Patient was seen and examined.     ROS: denies fever, chills, cp, sob, n/v, HA unless stated above.      amLODIPine  5 mg Oral Daily    ipratropium 0.5 mg-albuterol 2.5 mg  1 Dose Inhalation Q4H WA RT    methylPREDNISolone  40 mg IntraVENous Q12H    folic acid  1 mg Oral Daily    oseltamivir  75 mg Oral BID    indocyanine green  5 mg IntraVENous On Call to OR    enoxaparin  40 mg SubCUTAneous Daily    cefTRIAXone (ROCEPHIN) IV  2,000 mg IntraVENous Q24H    metroNIDAZOLE  500 mg IntraVENous Q8H    gabapentin  300 mg Oral QAM    metoprolol succinate  50 mg Oral BID    atorvastatin  10 mg Oral Daily    sodium chloride flush  5-40 mL IntraVENous 2 times per day     ipratropium 0.5 mg-albuterol 2.5 mg, 1 Dose, Q4H PRN  white petrolatum, , PRN  HYDROcodone-acetaminophen, 1 tablet, Daily PRN  sodium chloride flush, 5-40 mL, PRN  sodium chloride, , PRN  ondansetron, 4 mg, Q8H PRN   Or  ondansetron, 4 mg, Q6H PRN  polyethylene glycol, 17 g, Daily PRN  acetaminophen, 650 mg, Q6H PRN   Or  acetaminophen, 650 mg, Q6H PRN         Objective:    BP (!) 159/52   Pulse 64   Temp 98.2 °F (36.8 °C) (Oral)   Resp 16   Ht 1.549 m (5' 1\")   Wt 76.3 kg (168 lb 3.2 oz)   SpO2 94%   BMI 31.78 kg/m²     General Appearance: alert and oriented to person, place and time and in no acute distress  Skin: warm and dry  Head: normocephalic and atraumatic  Eyes: pupils equal, round, and reactive to light, extraocular eye movements intact, conjunctivae normal  Neck: neck supple and non  tender without mass   Pulmonary/Chest: clear to auscultation bilaterally- no wheezes, rales or rhonchi, normal air movement, no respiratory distress  Cardiovascular: normal rate, normal S1 and S2 and no carotid bruits  Abdomen: soft, non-tender, non-distended, normal bowel sounds, no masses or organomegaly  Extremities: no cyanosis, no clubbing and no edema  Neurologic: no cranial nerve deficit and speech normal        Recent Labs     01/15/25  0646 01/16/25  1147 01/17/25  0925    132 133   K 3.9 3.5 3.4*    100 96*   CO2 25 24 24   BUN 6 4* 10   CREATININE 0.6 0.6 0.6   GLUCOSE 92 90 146*   CALCIUM 7.9* 8.1* 8.5*       Recent Labs     01/15/25  0646 01/16/25  1147 01/17/25  0925   WBC 5.3 5.2 5.4   RBC 2.89* 2.86* 3.51   HGB 7.8* 7.8* 9.4*   HCT 24.5* 24.7* 29.7*   MCV 84.8 86.4 84.6   MCH 27.0 27.3 26.8   MCHC 31.8* 31.6* 31.6*   RDW 14.1 14.2 14.3   PLT  --   --  190   MPV 11.1 11.1 10.4       Radiology:     Assessment:    Principal Problem:    Cholecystitis  Active Problems:    Symptomatic cholelithiasis    Hyponatremia    PERCY (acute kidney injury) (HCC)    Thrombocytopenia (HCC)    Controlled type 2 diabetes mellitus without complication (HCC)    Hypokalemia  Resolved Problems:    * No resolved hospital problems. *        Acute cholecystitis continue antibiotics  General Surgery consulted  OR today           Flu A, start Tamiflu today.       History of 20-year tobacco product use COPD, mild exacerbation  Scheduled nebulizer treatment with Solu-Medrol 40 twice daily  S/p lasix 20mg iv x1 1/16.     Hyponatremia resolved after IV fluid nephro is following      Patient passed swallow eval yesterday    NOTE: This report was transcribed using voice recognition software. Every effort was made to ensure accuracy; however, inadvertent computerized transcription errors may be present.  Electronically signed by En Pineda DO on 1/17/2025 at 11:13 AM

## 2025-01-17 NOTE — CARE COORDINATION
Case Management... Patient is from home with her son independently. Patient uses a walker and a cane at home. Patient states her discharge plan is to return home with no needs. Ary today. Patient needing reassess for discharge needs after surgery.   Electronically signed by Laura Tompkins RN on 1/17/2025 at 9:58 AM

## 2025-01-17 NOTE — PROGRESS NOTES
Comprehensive Nutrition Assessment    Type and Reason for Visit:  Initial, LOS    Nutrition Recommendations/Plan:   When medically feasible, recommend advance diet as tolerated  Will add ONS to meals, when PO diet resumed postop  Continue inpatient monitoring POC     Malnutrition Assessment:  Malnutrition Status:  Severe malnutrition (01/17/25 1342)    Context:  Acute Illness     Findings of the 6 clinical characteristics of malnutrition:  Energy Intake:  50% or less of estimated energy requirements for 5 or more days  Weight Loss:  Greater than 7.5% over 3 months (12% loss (to admit wt 1/11))     Body Fat Loss:  Unable to assess (in OR/Droplet Isolation)     Muscle Mass Loss:  Unable to assess (In OR 1/17/Droplet isolation)    Fluid Accumulation:  Unable to assess Extremities (multiple factors contribute)   Strength:  Normal  strength    Nutrition Assessment:    Pt admit 2/2 symptomatic cholecystitis, PERCY, hyponatremia. Also, +Influenza.  Pt currently in OR 1/17 for robotic assisted cholecystectomy.  PMHx=DM, COPD, CHF, metastatic melanoma (follows at [facility]).  Will continue to monitor diet progression postop and POC.    Nutrition Related Findings:    Disoriented at times, nausea, soft obese abd +BS, +1-+2, +I/O 4.9L, K+ 3.4, Na 133 Wound Type: Surgical Incision       Current Nutrition Intake & Therapies:    Average Meal Intake: NPO (decreased appetite PTA and prior to NPO)  Average Supplements Intake: NPO  Diet NPO Exceptions are: Sips of Water with Meds    Anthropometric Measures:  Height: 154.9 cm (5' 1\")  Ideal Body Weight (IBW): 105 lbs (48 kg)    Admission Body Weight: 72.1 kg (158 lb 15.2 oz) (1/10 [facility])  Current Body Weight: 76.3 kg (168 lb 3.4 oz), 160.2 % IBW. Weight Source: Bed scale (1/17)  Current BMI (kg/m2): 31.8  Usual Body Weight: 81.3 kg (179 lb 3.7 oz) (9/11/24 at [facility])     % Weight Change (Calculated): -6.1                         Estimated Daily Nutrient Needs:  Energy Requirements  Based On: Formula ([employer] Jeor)  Weight Used for Energy Requirements: Admission  Energy (kcal/day):   Weight Used for Protein Requirements: Ideal  Protein (g/day): 55-65 (1.2-1.4 g/kg)  Method Used for Fluid Requirements: 1 ml/kcal  Fluid (ml/day):     Nutrition Diagnosis:   Severe malnutrition, in context of acute illness or injury related to catabolic illness (increased needs metastatic process in the setting of decreased intake) as evidenced by criteria as identified in malnutrition assessment    Nutrition Interventions:   Food and/or Nutrient Delivery: Continue NPO (When medically feasible, recommend advance diet as tolerated and will add ONS at that time)  Nutrition Education/Counseling: No recommendation at this time  Coordination of Nutrition Care: Continue to monitor while inpatient       Goals:  Goals: Initiate PO diet  Type of Goal: New goal  Previous Goal Met: New Goal    Nutrition Monitoring and Evaluation:   Behavioral-Environmental Outcomes: None Identified  Food/Nutrient Intake Outcomes: Progression of Nutrition  Physical Signs/Symptoms Outcomes: Biochemical Data, GI Status, Fluid Status or Edema, Nutrition Focused Physical Findings, Skin, Weight    Discharge Planning:    Too soon to determine     Lucero Jaime RD, CNSC, LD  Contact: x [phone]

## 2025-01-17 NOTE — PROGRESS NOTES
[facility]  [facility]  Progress Note    SUBJECTIVE:  No chief complaint on file.    Patient is up in bathroom with nursing trying to take her ring off prior to her surgical procedure  Patient reports overall she is doing well, no new complaints  No fevers overnight, on room air    Review of systems:  As stated above in the chief complaint, otherwise negative.    Medications:  Scheduled Meds:   amLODIPine  5 mg Oral Daily    ipratropium 0.5 mg-albuterol 2.5 mg  1 Dose Inhalation Q4H WA RT    methylPREDNISolone  40 mg IntraVENous Q12H    folic acid  1 mg Oral Daily    oseltamivir  75 mg Oral BID    indocyanine green  5 mg IntraVENous On Call to OR    enoxaparin  40 mg SubCUTAneous Daily    cefTRIAXone (ROCEPHIN) IV  2,000 mg IntraVENous Q24H    metroNIDAZOLE  500 mg IntraVENous Q8H    gabapentin  300 mg Oral QAM    metoprolol succinate  50 mg Oral BID    atorvastatin  10 mg Oral Daily    sodium chloride flush  5-40 mL IntraVENous 2 times per day     Continuous Infusions:   sodium chloride Stopped (25 1325)     PRN Meds:ipratropium 0.5 mg-albuterol 2.5 mg, white petrolatum, HYDROcodone-acetaminophen, sodium chloride flush, sodium chloride, ondansetron **OR** ondansetron, polyethylene glycol, acetaminophen **OR** acetaminophen    OBJECTIVE:  BP (!) 159/52   Pulse 64   Temp 98.2 °F (36.8 °C) (Oral)   Resp 16   Ht 1.549 m (5' 1\")   Wt 76.3 kg (168 lb 3.2 oz)   SpO2 94%   BMI 31.78 kg/m²   Temp  Av.4 °F (36.9 °C)  Min: 98.2 °F (36.8 °C)  Max: 98.5 °F (36.9 °C)  Constitutional: The patient is up in bathroom with nursing.  She is awake and in no distress.    Skin: Warm and dry. No rashes were noted.   HEENT: Round and reactive pupils.  Moist mucous membranes.  No ulcerations or thrush.  Neck: Supple to movements.   Chest: Wheezes noted.   Cardiovascular: Heart sounds rhythmic and regular.  Abdomen: Positive bowel sounds to auscultation.  Tender to palpation RUQ. No masses  felt.  Extremities: No edema.  Lines: Peripheral.    Laboratory and Tests:  Lab Results   Component Value Date    .0 (H) 01/12/2025     Lab Results   Component Value Date    SEDRATE 79 (H) 01/12/2025       Radiology:  CT of the abdomen pelvis reviewed  HIDA scan reviewed    Microbiology:   Blood cultures 1/10/2025: Serratia marcescens (resistant to Bactrim and Augmentin)  Blood cultures 1/12/2025: Negative so far  Urine culture 1/10/2025: No growth  MRSA nares 1/13: Negative    ASSESSMENT:  Status post ERCP 1/3/2025  Cholecystitis with probable cholangitis  Serratia marcescens septicemia associated to the above  Leukocytosis associated to the above, improved  Hypotension associated to the above, improved  Influenza A infection    PLAN:  Continue Ceftriaxone and Metronidazole  Continue oseltamivir  For cholecystectomy today  We will follow with you      Juli Red, APRN - CNP  11:11 AM  1/17/2025    Assessment and plan as outlined above and directed by me. Addition and corrections were done as deemed appropriate.  Patient went down to the OR for a cholecystectomy.    Zeke Hobson MD  1/17/2025  12:51 PM

## 2025-01-17 NOTE — ANESTHESIA PRE PROCEDURE
02/23/2024 10:05 AM    GLUCOSE 146 01/17/2025 09:25 AM    CALCIUM 8.5 01/17/2025 09:25 AM    BILITOT 0.3 01/15/2025 06:46 AM    ALKPHOS 259 01/15/2025 06:46 AM    AST 16 01/15/2025 06:46 AM    ALT 11 01/15/2025 06:46 AM       POC Tests: No results for input(s): \"POCGLU\", \"POCNA\", \"POCK\", \"POCCL\", \"POCBUN\", \"POCHEMO\", \"POCHCT\" in the last 72 hours.    Coags: No results found for: \"PROTIME\", \"INR\", \"APTT\"    HCG (If Applicable): No results found for: \"PREGTESTUR\", \"PREGSERUM\", \"HCG\", \"HCGQUANT\"     ABGs: No results found for: \"PHART\", \"PO2ART\", \"DIX6QMZ\", \"YDZ7OTU\", \"BEART\", \"R4OMYOFM\"     Type & Screen (If Applicable):  No results found for: \"ABORH\", \"LABANTI\"    Drug/Infectious Status (If Applicable):  No results found for: \"HIV\", \"HEPCAB\"    COVID-19 Screening (If Applicable):   Lab Results   Component Value Date/Time    COVID19 Not Detected 01/16/2025 10:30 AM           Anesthesia Evaluation  Patient summary reviewed and Nursing notes reviewed   no history of anesthetic complications:   Airway: Mallampati: III  TM distance: >3 FB   Neck ROM: full  Mouth opening: > = 3 FB   Dental:          Pulmonary: breath sounds clear to auscultation  (+)  COPD:   recent URI (Flu-A):            (-) not a current smoker (Former)                           Cardiovascular:    (+) hypertension:, SOLIS:, hyperlipidemia        Rhythm: regular  Rate: normal           Beta Blocker:  Dose within 24 Hrs         Neuro/Psych:                ROS comment: Hx DJD GI/Hepatic/Renal:            ROS comment: MRI Abdomen 12/18/2024    IMPRESSION:     5 mm stone in the distal common bile duct with upstream dilation of the   common duct up to 7 mm.     Cholelithiasis.     Unchanged 1 cm hepatic metastasis. Additional previously identified   metastasis are not perceptible.     Two enhancing nodules in the right anterior abdominal wall consistent   with metastasis.   .   Endo/Other:    (+) DiabetesType II DM, hypothyroidism: arthritis:., malignancy/cancer

## 2025-01-17 NOTE — PROGRESS NOTES
MCHC 31.6 01/16/2025 11:47 AM    RDW 14.2 01/16/2025 11:47 AM     01/11/2025 05:57 AM    MPV 11.1 01/16/2025 11:47 AM     CMP:    Lab Results   Component Value Date/Time     01/16/2025 11:47 AM    K 3.5 01/16/2025 11:47 AM     01/16/2025 11:47 AM    CO2 24 01/16/2025 11:47 AM    BUN 4 01/16/2025 11:47 AM    CREATININE 0.6 01/16/2025 11:47 AM    CREATININE 1.0 03/06/2019 12:00 AM    GFRAA >60 09/14/2022 10:35 AM    LABGLOM >90 01/16/2025 11:47 AM    LABGLOM >60 02/23/2024 10:05 AM    GLUCOSE 90 01/16/2025 11:47 AM    CALCIUM 8.1 01/16/2025 11:47 AM    BILITOT 0.3 01/15/2025 06:46 AM    ALKPHOS 259 01/15/2025 06:46 AM    AST 16 01/15/2025 06:46 AM    ALT 11 01/15/2025 06:46 AM     Magnesium:    Lab Results   Component Value Date/Time    MG 1.7 01/14/2025 08:27 AM     Phosphorus:  No results found for: \"PHOS\"    Radiology Review:      US GALLBLADDER RUQ [[id number] 1/10/2025]     IMPRESSION:  Cholelithiasis with mild gallbladder wall thickening and positive sonographic  Macdonald sign, concerning for acute cholecystitis.    BRIEF SUMMARY OF INITIAL CONSULT:    Briefly Mrs. Haskins is a 84-year-old female with history of HTN, type II DM, metastatic melanoma chemotherapy at [facility], COPD, cholelithiasis and choledocho lithiasis who had recent ERCP with removal of stone, hypothyroidism, hyperlipidemia, DJD, who was admitted on 1/10/2025 after he presented to the ER with generalized weakness and abdominal pain, gallbladder ultrasound showed cholelithiasis with mild gallbladder wall thickening with positive sonographic Macdonald sign concerning for acute cholecystitis, on admission she was found to have hyponatremia with a sodium level of 119 mEq/L and with elevated creatinine of 2.7 mg/dL, reasons for this consultation. Her medications prior to admission included amiloride-HCTZ, losartan. Her baseline creatinine is 0.9-1 mg/dL.      Problems resolved:  PERCY stage II, volume responsive PERCY (poor intake, diuretics) in

## 2025-01-18 VITALS
HEART RATE: 64 BPM | OXYGEN SATURATION: 97 % | BODY MASS INDEX: 31.72 KG/M2 | HEIGHT: 61 IN | TEMPERATURE: 98.1 F | SYSTOLIC BLOOD PRESSURE: 133 MMHG | WEIGHT: 168 LBS | DIASTOLIC BLOOD PRESSURE: 74 MMHG | RESPIRATION RATE: 18 BRPM

## 2025-01-18 LAB
ALBUMIN SERPL-MCNC: 3.3 G/DL (ref 3.5–5.2)
ALP SERPL-CCNC: 190 U/L (ref 35–104)
ALT SERPL-CCNC: 13 U/L (ref 0–32)
ANION GAP SERPL CALCULATED.3IONS-SCNC: 9 MMOL/L (ref 7–16)
AST SERPL-CCNC: 38 U/L (ref 0–31)
BASOPHILS # BLD: 0.01 K/UL (ref 0–0.2)
BASOPHILS NFR BLD: 0 % (ref 0–2)
BILIRUB SERPL-MCNC: 0.4 MG/DL (ref 0–1.2)
BUN SERPL-MCNC: 14 MG/DL (ref 6–23)
CALCIUM SERPL-MCNC: 8.7 MG/DL (ref 8.6–10.2)
CHLORIDE SERPL-SCNC: 99 MMOL/L (ref 98–107)
CO2 SERPL-SCNC: 26 MMOL/L (ref 22–29)
CREAT SERPL-MCNC: 0.7 MG/DL (ref 0.5–1)
EOSINOPHIL # BLD: 0 K/UL (ref 0.05–0.5)
EOSINOPHILS RELATIVE PERCENT: 0 % (ref 0–6)
ERYTHROCYTE [DISTWIDTH] IN BLOOD BY AUTOMATED COUNT: 14.2 % (ref 11.5–15)
GFR, ESTIMATED: 87 ML/MIN/1.73M2
GLUCOSE SERPL-MCNC: 131 MG/DL (ref 74–99)
HCT VFR BLD AUTO: 32.5 % (ref 34–48)
HGB BLD-MCNC: 10.3 G/DL (ref 11.5–15.5)
IMM GRANULOCYTES # BLD AUTO: 0.39 K/UL (ref 0–0.58)
IMM GRANULOCYTES NFR BLD: 4 % (ref 0–5)
LYMPHOCYTES NFR BLD: 0.76 K/UL (ref 1.5–4)
LYMPHOCYTES RELATIVE PERCENT: 7 % (ref 20–42)
MCH RBC QN AUTO: 27.1 PG (ref 26–35)
MCHC RBC AUTO-ENTMCNC: 31.7 G/DL (ref 32–34.5)
MCV RBC AUTO: 85.5 FL (ref 80–99.9)
MONOCYTES NFR BLD: 0.19 K/UL (ref 0.1–0.95)
MONOCYTES NFR BLD: 2 % (ref 2–12)
NEUTROPHILS NFR BLD: 87 % (ref 43–80)
NEUTS SEG NFR BLD: 9.31 K/UL (ref 1.8–7.3)
PLATELET # BLD AUTO: 237 K/UL (ref 130–450)
PMV BLD AUTO: 10.7 FL (ref 7–12)
POTASSIUM SERPL-SCNC: 3.7 MMOL/L (ref 3.5–5)
PROT SERPL-MCNC: 6.4 G/DL (ref 6.4–8.3)
RBC # BLD AUTO: 3.8 M/UL (ref 3.5–5.5)
SODIUM SERPL-SCNC: 134 MMOL/L (ref 132–146)
WBC OTHER # BLD: 10.7 K/UL (ref 4.5–11.5)

## 2025-01-18 PROCEDURE — 6370000000 HC RX 637 (ALT 250 FOR IP)

## 2025-01-18 PROCEDURE — 6360000002 HC RX W HCPCS

## 2025-01-18 PROCEDURE — 36415 COLL VENOUS BLD VENIPUNCTURE: CPT

## 2025-01-18 PROCEDURE — 94640 AIRWAY INHALATION TREATMENT: CPT

## 2025-01-18 PROCEDURE — 80053 COMPREHEN METABOLIC PANEL: CPT

## 2025-01-18 PROCEDURE — 85025 COMPLETE CBC W/AUTO DIFF WBC: CPT

## 2025-01-18 PROCEDURE — 2500000003 HC RX 250 WO HCPCS

## 2025-01-18 RX ORDER — PREDNISONE 20 MG/1
40 TABLET ORAL DAILY
Qty: 10 TABLET | Refills: 0 | Status: SHIPPED | OUTPATIENT
Start: 2025-01-18 | End: 2025-01-23

## 2025-01-18 RX ORDER — OSELTAMIVIR PHOSPHATE 75 MG/1
75 CAPSULE ORAL 2 TIMES DAILY
Qty: 2 CAPSULE | Refills: 0 | Status: SHIPPED | OUTPATIENT
Start: 2025-01-18 | End: 2025-01-19

## 2025-01-18 RX ORDER — IPRATROPIUM BROMIDE AND ALBUTEROL SULFATE 2.5; .5 MG/3ML; MG/3ML
1 SOLUTION RESPIRATORY (INHALATION)
Status: DISCONTINUED | OUTPATIENT
Start: 2025-01-18 | End: 2025-01-18 | Stop reason: HOSPADM

## 2025-01-18 RX ADMIN — ATORVASTATIN CALCIUM 10 MG: 10 TABLET, FILM COATED ORAL at 10:11

## 2025-01-18 RX ADMIN — METHYLPREDNISOLONE SODIUM SUCCINATE 40 MG: 40 INJECTION INTRAMUSCULAR; INTRAVENOUS at 00:38

## 2025-01-18 RX ADMIN — FOLIC ACID 1 MG: 1 TABLET ORAL at 10:11

## 2025-01-18 RX ADMIN — SODIUM CHLORIDE, PRESERVATIVE FREE 10 ML: 5 INJECTION INTRAVENOUS at 10:11

## 2025-01-18 RX ADMIN — AMLODIPINE BESYLATE 5 MG: 5 TABLET ORAL at 10:11

## 2025-01-18 RX ADMIN — METHYLPREDNISOLONE SODIUM SUCCINATE 40 MG: 40 INJECTION INTRAMUSCULAR; INTRAVENOUS at 13:08

## 2025-01-18 RX ADMIN — METOPROLOL SUCCINATE 50 MG: 50 TABLET, EXTENDED RELEASE ORAL at 10:10

## 2025-01-18 RX ADMIN — OSELTAMIVIR PHOSPHATE 75 MG: 75 CAPSULE ORAL at 10:11

## 2025-01-18 RX ADMIN — ACETAMINOPHEN 650 MG: 325 TABLET ORAL at 10:10

## 2025-01-18 RX ADMIN — ENOXAPARIN SODIUM 40 MG: 100 INJECTION SUBCUTANEOUS at 10:11

## 2025-01-18 RX ADMIN — GABAPENTIN 300 MG: 300 CAPSULE ORAL at 10:11

## 2025-01-18 RX ADMIN — IPRATROPIUM BROMIDE AND ALBUTEROL SULFATE 1 DOSE: 2.5; .5 SOLUTION RESPIRATORY (INHALATION) at 09:24

## 2025-01-18 RX ADMIN — SODIUM CHLORIDE, PRESERVATIVE FREE 10 ML: 5 INJECTION INTRAVENOUS at 13:08

## 2025-01-18 ASSESSMENT — PAIN - FUNCTIONAL ASSESSMENT: PAIN_FUNCTIONAL_ASSESSMENT: ACTIVITIES ARE NOT PREVENTED

## 2025-01-18 ASSESSMENT — PAIN SCALES - GENERAL
PAINLEVEL_OUTOF10: 5
PAINLEVEL_OUTOF10: 2

## 2025-01-18 ASSESSMENT — PAIN DESCRIPTION - DESCRIPTORS: DESCRIPTORS: DISCOMFORT;DULL;TENDER

## 2025-01-18 ASSESSMENT — PAIN DESCRIPTION - LOCATION: LOCATION: ABDOMEN

## 2025-01-18 NOTE — PROGRESS NOTES
[facility]  [facility]  Progress Note  CC: abd pain  SUBJECTIVE:  Patient is sitting up in the chair.  She is dressed in street clothes  Says she is going home today she is just waiting on her paperwork  Says she is feeling fine  No fevers    Review of systems:  As stated above in the chief complaint, otherwise negative.    Medications:  Scheduled Meds:   ipratropium 0.5 mg-albuterol 2.5 mg  1 Dose Inhalation BID RT    amLODIPine  5 mg Oral Daily    methylPREDNISolone  40 mg IntraVENous Q12H    folic acid  1 mg Oral Daily    oseltamivir  75 mg Oral BID    enoxaparin  40 mg SubCUTAneous Daily    gabapentin  300 mg Oral QAM    metoprolol succinate  50 mg Oral BID    atorvastatin  10 mg Oral Daily    sodium chloride flush  5-40 mL IntraVENous 2 times per day     Continuous Infusions:   sodium chloride Stopped (25 1325)     PRN Meds:oxyCODONE **OR** oxyCODONE, ipratropium 0.5 mg-albuterol 2.5 mg, white petrolatum, sodium chloride flush, sodium chloride, ondansetron **OR** ondansetron, polyethylene glycol, acetaminophen **OR** acetaminophen    OBJECTIVE:  /74   Pulse 64   Temp 98.1 °F (36.7 °C) (Oral)   Resp 18   Ht 1.549 m (5' 1\")   Wt 76.2 kg (168 lb)   SpO2 97%   BMI 31.74 kg/m²   Temp  Av.8 °F (36.6 °C)  Min: 97 °F (36.1 °C)  Max: 98.4 °F (36.9 °C)  Constitutional: The patient is sitting up in the chair.  She is in no distress.  Son at bedside  Skin: Warm and dry. No rashes were noted.   HEENT: Round and reactive pupils.  Moist mucous membranes.  No ulcerations or thrush.  Neck: Supple to movements.   Chest: Wheezes noted.   Cardiovascular: Heart sounds rhythmic and regular.  Abdomen: Positive bowel sounds to auscultation.  Lap sites clean. No masses felt.  Extremities: No edema.  Lines: Peripheral.    Laboratory and Tests:  Lab Results   Component Value Date    .0 (H) 2025     Lab Results   Component Value Date    SEDRATE 79 (H) 2025

## 2025-01-18 NOTE — DISCHARGE SUMMARY
[facility] Hospitalist Physician Discharge Summary       Kiet Chambers MD  [address] [facility] [address]  [phone]    Follow up in 2 week(s)        Activity level: As tolerated     Dispo: home       Condition on discharge: Stable     Patient ID:  Colleen Qiu  [id number]  84 y.o.  1940    Admit date: 1/10/2025    Discharge date and time:  1/18/2025  12:31 PM    Admission Diagnoses: Principal Problem:    Cholecystitis  Active Problems:    Symptomatic cholelithiasis    Hyponatremia    PERCY (acute kidney injury) (HCC)    Thrombocytopenia (HCC)    Controlled type 2 diabetes mellitus without complication (HCC)    Hypokalemia    Severe protein-calorie malnutrition (HCC)  Resolved Problems:    * No resolved hospital problems. *      Discharge Diagnoses: Principal Problem:    Cholecystitis  Active Problems:    Symptomatic cholelithiasis    Hyponatremia    PERCY (acute kidney injury) (HCC)    Thrombocytopenia (HCC)    Controlled type 2 diabetes mellitus without complication (HCC)    Hypokalemia    Severe protein-calorie malnutrition (HCC)  Resolved Problems:    * No resolved hospital problems. *      Consults:  IP CONSULT TO NEPHROLOGY  IP CONSULT TO GENERAL SURGERY  IP CONSULT TO INTERNAL MEDICINE  IP CONSULT TO INFECTIOUS DISEASES  IP CONSULT TO VASCULAR ACCESS TEAM  IP CONSULT TO VASCULAR ACCESS TEAM  IP CONSULT TO VASCULAR ACCESS TEAM  IP CONSULT TO VASCULAR ACCESS TEAM  IP CONSULT TO VASCULAR ACCESS TEAM    Procedures:     Hospital Course:   84-year-old female admitted for acute cholecystitis neurosurgery ID consulted patient finished empiric antibiotic coverage for acute cholecystitis have cholecystectomy done by general surgeon patient received IV fluid does have a little bit of COPD exacerbation and fluid overload from volume overload received Lasix nebulizer treatment and also Solu-Medrol her restless symptoms resolved at discharge treated with Tamiflu with influenza A medically

## 2025-01-18 NOTE — RT PROTOCOL NOTE
RT Nebulizer Bronchodilator Protocol Note    There is a bronchodilator order in the chart from a provider indicating to follow the RT Bronchodilator Protocol and there is an “Initiate RT Bronchodilator Protocol” order as well (see protocol at bottom of note).    CXR Findings:  No results found.    The findings from the last RT Protocol Assessment were as follows:  Smoking: None or smoker <15 pack years  Respiratory Pattern: Regular pattern and RR 12-20 bpm  Breath Sounds: Intermittent or unilateral wheezes  Cough: Strong, spontaneous, non-productive  Indication for Bronchodilator Therapy: None  Bronchodilator Assessment Score: 4    Aerosolized bronchodilator medication orders have been revised according to the RT Nebulizer Bronchodilator Protocol below.    Respiratory Therapist to perform RT Therapy Protocol Assessment initially then follow the protocol.  Repeat RT Therapy Protocol Assessment PRN for score 0-3 or on second treatment, BID, and PRN for scores above 3.    No Indications - adjust the frequency to every 6 hours PRN wheezing or bronchospasm, if no treatments needed after 48 hours then discontinue using Per Protocol order mode.     If indication present, adjust the RT bronchodilator orders based on the Bronchodilator Assessment Score as indicated below.  If a patient is on this medication at home then do not decrease Frequency below that used at home.    0-3 - enter or revise RT bronchodilator order(s) to equivalent RT Bronchodilator order with Frequency of every 4 hours PRN for wheezing or increased work of breathing using Per Protocol order mode.       4-6 - enter or revise RT Bronchodilator order(s) to two equivalent RT bronchodilator orders with one order with BID Frequency and one order with Frequency of every 4 hours PRN wheezing or increased work of breathing using Per Protocol order mode.         7-10 - enter or revise RT Bronchodilator order(s) to two equivalent RT bronchodilator orders with one

## 2025-01-18 NOTE — PLAN OF CARE
Problem: ABCDS Injury Assessment  Goal: Absence of physical injury  Outcome: Progressing     Problem: Discharge Planning  Goal: Discharge to home or other facility with appropriate resources  Outcome: Progressing     Problem: Chronic Conditions and Co-morbidities  Goal: Patient's chronic conditions and co-morbidity symptoms are monitored and maintained or improved  Outcome: Progressing     Problem: Safety - Adult  Goal: Free from fall injury  1/18/2025 1145 by Nivia lAanis RN  Outcome: Progressing  1/18/2025 0200 by Araceli Koch RN  Outcome: Progressing     Problem: Pain  Goal: Verbalizes/displays adequate comfort level or baseline comfort level  1/18/2025 1145 by Nivia Alanis RN  Outcome: Progressing  1/18/2025 0200 by Araceli Koch RN  Outcome: Progressing     Problem: Nutrition Deficit:  Goal: Optimize nutritional status  1/18/2025 1145 by Nivia Alanis RN  Outcome: Progressing  1/18/2025 0200 by Araceli Koch RN  Outcome: Progressing  Flowsheets (Taken 1/17/2025 1344 by Lucero Jaime, RD, CNSC, LD)  Nutrient intake appropriate for improving, restoring, or maintaining nutritional needs: Assess nutritional status and recommend course of action

## 2025-01-18 NOTE — PROGRESS NOTES
Department of Internal Medicine  Nephrology Attending Progress Note      Events reviewed.     SUBJECTIVE: We are following Ms. Jozef Qiu for PERCY and hyponatremia. Patient reports no new complaints today.    PHYSICAL EXAM:      Vitals:    VITALS:  BP (!) 148/69   Pulse 58   Temp 98.1 °F (36.7 °C) (Oral)   Resp 16   Ht 1.549 m (5' 1\")   Wt 76.2 kg (168 lb)   SpO2 96%   BMI 31.74 kg/m²   24HR INTAKE/OUTPUT:    Intake/Output Summary (Last 24 hours) at 1/18/2025 0934  Last data filed at 1/18/2025 0618  Gross per 24 hour   Intake 500 ml   Output 950 ml   Net -450 ml       Constitutional: Patient is awake, alert in no distress  HEENT: Pupils are equal reactive  Respiratory: Lungs are clear  Cardiovascular/Edema: Heart sounds are regular  Gastrointestinal: Abdomen soft  Neurologic: Nonfocal  Skin: No lesions  Other: No edema      Scheduled Meds:   amLODIPine  5 mg Oral Daily    ipratropium 0.5 mg-albuterol 2.5 mg  1 Dose Inhalation Q4H WA RT    methylPREDNISolone  40 mg IntraVENous Q12H    folic acid  1 mg Oral Daily    oseltamivir  75 mg Oral BID    enoxaparin  40 mg SubCUTAneous Daily    gabapentin  300 mg Oral QAM    metoprolol succinate  50 mg Oral BID    atorvastatin  10 mg Oral Daily    sodium chloride flush  5-40 mL IntraVENous 2 times per day     Continuous Infusions:   sodium chloride Stopped (01/11/25 1325)     PRN Meds:.oxyCODONE **OR** oxyCODONE, ipratropium 0.5 mg-albuterol 2.5 mg, white petrolatum, sodium chloride flush, sodium chloride, ondansetron **OR** ondansetron, polyethylene glycol, acetaminophen **OR** acetaminophen      DATA:    CBC:   Lab Results   Component Value Date/Time    WBC 10.7 01/18/2025 05:42 AM    RBC 3.80 01/18/2025 05:42 AM    HGB 10.3 01/18/2025 05:42 AM    HCT 32.5 01/18/2025 05:42 AM    MCV 85.5 01/18/2025 05:42 AM    MCH 27.1 01/18/2025 05:42 AM    MCHC 31.7 01/18/2025 05:42 AM    RDW 14.2 01/18/2025 05:42 AM     01/18/2025 05:42 AM    MPV 10.7 01/18/2025 05:42  2/2 lack of free water excretion due to decreased GFR (PERCY) and possibly thiazide induced, in the setting of decreased solute intake and NSAIDs administration,urine specific gravity 1.020, urine sodium 25, urine osmolality 259. Sodium stable at 134 today.    HTN, on metoprolol and amlodipine  Hypokalemia, 2/2 poor intake, and probably renal potassium wasting due to methylprednisolone levels improved  Hypoalbuminemia, malnutrition  Normocytic anemia, ferritin 393, iron 14, iron saturation 11%, folate 3.5, vitamin B 12, to replace folate  -----------------------------------------------  Acute cholecystitis, status post laparoscopic robotic assisted cholecystectomy  Recent choledocholithiasis status post ERCP  Metastatic melanoma  Hyperlipidemia, on atorvastatin  Type II DM     Plan:     Continue metoprolol succinate 50 mg twice daily, amlodipine 5 mg    Continue to monitor renal function  Continue to monitor sodium level  Continue to monitor blood pressure  Discharge planning      Electronically signed by Adrianna Elmore MD on 1/18/2025 at 9:34 AM

## 2025-01-18 NOTE — PROGRESS NOTES
Doing very well post op  No pain  Tolerated dinner last night    Ok for DC from surgical POV and will see Dr. Chambers for follow up    Electronically signed by Larry Garcia DO on 1/18/2025 at 7:41 AM

## 2025-01-22 LAB — SURGICAL PATHOLOGY REPORT: NORMAL

## 2025-01-28 ENCOUNTER — OFFICE VISIT (OUTPATIENT)
Dept: PRIMARY CARE CLINIC | Age: 85
End: 2025-01-28

## 2025-01-28 VITALS
RESPIRATION RATE: 16 BRPM | SYSTOLIC BLOOD PRESSURE: 112 MMHG | BODY MASS INDEX: 31.18 KG/M2 | TEMPERATURE: 99.1 F | OXYGEN SATURATION: 99 % | DIASTOLIC BLOOD PRESSURE: 52 MMHG | WEIGHT: 165 LBS | HEART RATE: 70 BPM

## 2025-01-28 DIAGNOSIS — E78.2 MIXED HYPERLIPIDEMIA: Chronic | ICD-10-CM

## 2025-01-28 DIAGNOSIS — I10 ESSENTIAL HYPERTENSION: Chronic | ICD-10-CM

## 2025-01-28 DIAGNOSIS — Z09 HOSPITAL DISCHARGE FOLLOW-UP: Primary | ICD-10-CM

## 2025-01-28 PROBLEM — K80.50 CHOLEDOCHOLITHIASIS: Status: RESOLVED | Noted: 2024-12-19 | Resolved: 2025-01-28

## 2025-01-28 PROBLEM — E43 SEVERE PROTEIN-CALORIE MALNUTRITION (HCC): Status: RESOLVED | Noted: 2025-01-17 | Resolved: 2025-01-28

## 2025-01-28 PROBLEM — N17.9 AKI (ACUTE KIDNEY INJURY) (HCC): Status: RESOLVED | Noted: 2025-01-11 | Resolved: 2025-01-28

## 2025-01-28 PROBLEM — E87.1 HYPONATREMIA: Status: RESOLVED | Noted: 2025-01-11 | Resolved: 2025-01-28

## 2025-01-28 PROBLEM — K81.9 CHOLECYSTITIS: Status: RESOLVED | Noted: 2025-01-10 | Resolved: 2025-01-28

## 2025-01-28 PROBLEM — E87.6 HYPOKALEMIA: Status: RESOLVED | Noted: 2025-01-15 | Resolved: 2025-01-28

## 2025-01-28 PROBLEM — K80.20 SYMPTOMATIC CHOLELITHIASIS: Status: RESOLVED | Noted: 2024-12-03 | Resolved: 2025-01-28

## 2025-01-28 PROBLEM — D69.6 THROMBOCYTOPENIA (HCC): Status: RESOLVED | Noted: 2025-01-11 | Resolved: 2025-01-28

## 2025-01-28 PROBLEM — E11.9 CONTROLLED TYPE 2 DIABETES MELLITUS WITHOUT COMPLICATION (HCC): Status: RESOLVED | Noted: 2025-01-11 | Resolved: 2025-01-28

## 2025-01-28 RX ORDER — AMILORIDE HYDROCHLORIDE AND HYDROCHLOROTHIAZIDE 5; 50 MG/1; MG/1
0.5 TABLET ORAL DAILY
Qty: 45 TABLET | Refills: 3
Start: 2025-01-28

## 2025-01-28 ASSESSMENT — PATIENT HEALTH QUESTIONNAIRE - PHQ9
SUM OF ALL RESPONSES TO PHQ QUESTIONS 1-9: 0
2. FEELING DOWN, DEPRESSED OR HOPELESS: NOT AT ALL
SUM OF ALL RESPONSES TO PHQ9 QUESTIONS 1 & 2: 0
1. LITTLE INTEREST OR PLEASURE IN DOING THINGS: NOT AT ALL
SUM OF ALL RESPONSES TO PHQ QUESTIONS 1-9: 0

## 2025-01-28 NOTE — PROGRESS NOTES
Post-Discharge Transitional Care  Follow Up      Colleen Qiu   YOB: 1940    Date of Office Visit:  1/28/2025  Date of Hospital Admission: 1/10/25  Date of Hospital Discharge: 1/18/25  Risk of hospital readmission (high >=14%. Medium >=10%) :Readmission Risk Score: 13.3      Care management risk score Rising risk (score 2-5) and Complex Care (Scores >=6): No Risk Score On File     Non face to face  following discharge, date last encounter closed (first attempt may have been earlier): *No documented post hospital discharge outreach found in the last 14 days    Call initiated 2 business days of discharge: *No response recorded in the last 14 days    ASSESSMENT/PLAN:   Hospital discharge follow-up  -     MD DISCHARGE MEDS RECONCILED W/ CURRENT OUTPATIENT MED LIST  Essential hypertension  -     aMILoride-HCTZ 5-50 MG TABS; Take 0.5 tablets by mouth daily, Disp-45 tablet, R-3NO PRINT  Mixed hyperlipidemia      Medical Decision Making: high complexity  Return in about 4 weeks (around 2/25/2025).    On this date 1/28/2025 I have spent 45 minutes reviewing previous notes, test results and face to face with the patient discussing the diagnosis and importance of compliance with the treatment plan as well as documenting on the day of the visit.       Subjective:   HPI:  Follow up of Hospital problems/diagnosis(es):     Inpatient course: Discharge summary reviewed- see chart.    Interval history/Current status: GB   patient presented to the ER with abdominal pain found to have acute cholecystitis.  She had workup in [place] with MRI showed gallbladder stone she called here and we referred her to general surgery and he scheduled her for surgery but she felt a pop rhonda pain before she gets a surgery date.  Her kidney function was off she was seen by nephrology.  She is for scans and MRI soon for [facility] for melanoma follow-up.        Patient Active Problem List   Diagnosis    
No overt physical signs of malnutrition

## 2025-01-29 ENCOUNTER — TELEPHONE (OUTPATIENT)
Dept: PRIMARY CARE CLINIC | Age: 85
End: 2025-01-29

## 2025-01-29 DIAGNOSIS — R42 DIZZINESS: Primary | ICD-10-CM

## 2025-01-29 DIAGNOSIS — H92.03 OTALGIA OF BOTH EARS: ICD-10-CM

## 2025-02-03 PROBLEM — K80.50 CHOLEDOCHOLITHIASIS: Status: ACTIVE | Noted: 2025-01-06

## 2025-02-06 ENCOUNTER — OFFICE VISIT (OUTPATIENT)
Dept: ENT CLINIC | Age: 85
End: 2025-02-06
Payer: MEDICARE

## 2025-02-06 ENCOUNTER — PROCEDURE VISIT (OUTPATIENT)
Dept: AUDIOLOGY | Age: 85
End: 2025-02-06
Payer: MEDICARE

## 2025-02-06 VITALS
SYSTOLIC BLOOD PRESSURE: 138 MMHG | WEIGHT: 140.8 LBS | HEIGHT: 61 IN | BODY MASS INDEX: 26.58 KG/M2 | DIASTOLIC BLOOD PRESSURE: 75 MMHG | HEART RATE: 65 BPM

## 2025-02-06 DIAGNOSIS — R42 DYSEQUILIBRIUM: Primary | ICD-10-CM

## 2025-02-06 DIAGNOSIS — J30.89 SEASONAL ALLERGIC RHINITIS DUE TO OTHER ALLERGIC TRIGGER: ICD-10-CM

## 2025-02-06 DIAGNOSIS — R09.81 NASAL CONGESTION: ICD-10-CM

## 2025-02-06 DIAGNOSIS — H69.93 EUSTACHIAN TUBE DYSFUNCTION, BILATERAL: ICD-10-CM

## 2025-02-06 DIAGNOSIS — H65.92 MIDDLE EAR EFFUSION, LEFT: ICD-10-CM

## 2025-02-06 DIAGNOSIS — H69.93 DYSFUNCTION OF BOTH EUSTACHIAN TUBES: Primary | ICD-10-CM

## 2025-02-06 PROCEDURE — 3075F SYST BP GE 130 - 139MM HG: CPT | Performed by: OTOLARYNGOLOGY

## 2025-02-06 PROCEDURE — 1159F MED LIST DOCD IN RCRD: CPT | Performed by: OTOLARYNGOLOGY

## 2025-02-06 PROCEDURE — 3078F DIAST BP <80 MM HG: CPT | Performed by: OTOLARYNGOLOGY

## 2025-02-06 PROCEDURE — 92567 TYMPANOMETRY: CPT

## 2025-02-06 PROCEDURE — 1123F ACP DISCUSS/DSCN MKR DOCD: CPT | Performed by: OTOLARYNGOLOGY

## 2025-02-06 PROCEDURE — 99204 OFFICE O/P NEW MOD 45 MIN: CPT | Performed by: OTOLARYNGOLOGY

## 2025-02-06 RX ORDER — FLUTICASONE PROPIONATE 50 MCG
2 SPRAY, SUSPENSION (ML) NASAL DAILY
Qty: 16 G | Refills: 5 | Status: SHIPPED | OUTPATIENT
Start: 2025-02-06

## 2025-02-06 ASSESSMENT — ENCOUNTER SYMPTOMS
RESPIRATORY NEGATIVE: 1
RHINORRHEA: 0
ALLERGIC/IMMUNOLOGIC NEGATIVE: 1
SORE THROAT: 0

## 2025-02-06 NOTE — PROGRESS NOTES
Department of Otolaryngology  Office Consult Note  2/6/25          Subjective:        Chief Complaint:  had concerns including New Patient (NEW PATIENT - NP-dizziness; referred by Dr. Mg/).     Patient ID: Colleen Qiu is a 84 y.o. female.    HPI: Patient presents as  new patient for dysequilibrium and ear issues. Patient states she has been off balance and lightheaded for the past several months progressively worsening.  She is currently undergoing melanoma infusion treatments and believe this may be contributing to it.  She feels lightheaded and off balance when she is moving especially when lifting her arms over her head.  Denies room spinning sensation, vertigo, hearing loss, aural fullness, tinnitus with these associated symptoms.  She does however note her hearing has progressively become worse over time.  No room spinnning   No headache   No loud noise exposure   No otorrhea otalagia surery to ear     Review of Systems   Constitutional: Negative.    HENT:  Negative for congestion, ear discharge, ear pain, hearing loss, rhinorrhea, sneezing and sore throat.    Respiratory: Negative.     Cardiovascular:  Negative for chest pain.   Musculoskeletal: Negative.    Skin: Negative.    Allergic/Immunologic: Negative.    Neurological: Negative.    Psychiatric/Behavioral: Negative.     All other systems reviewed and are negative.        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

## 2025-02-06 NOTE — PROGRESS NOTES
This patient was referred for tympanometric testing by Dr. Marcus due to dizziness.     Tympanometry revealed negative middle ear pressure (-184 daPa), in the right ear and negative middle ear pressure (-345 daPa), in the left ear.    The results were reviewed with the patient and ordering provider.     Recommendations for follow up will be made pending ordering provider consult.    Humberto Posadas/CCC-A  [place] Lic [id number]  Electronically signed by Humberto Posadas on 2/6/2025 at 3:46 PM

## 2025-02-10 ENCOUNTER — HOSPITAL ENCOUNTER (OUTPATIENT)
Dept: GENERAL RADIOLOGY | Age: 85
Discharge: HOME | End: 2025-02-12
Payer: MEDICARE

## 2025-02-10 ENCOUNTER — HOSPITAL ENCOUNTER (OUTPATIENT)
Age: 85
Discharge: HOME | End: 2025-02-12
Payer: MEDICARE

## 2025-02-10 DIAGNOSIS — K80.50 CHOLEDOCHOLITHIASIS: ICD-10-CM

## 2025-02-10 PROCEDURE — 74018 RADEX ABDOMEN 1 VIEW: CPT

## 2025-02-11 ENCOUNTER — OFFICE VISIT (OUTPATIENT)
Dept: SURGERY | Age: 85
End: 2025-02-11

## 2025-02-11 VITALS
DIASTOLIC BLOOD PRESSURE: 71 MMHG | HEART RATE: 64 BPM | SYSTOLIC BLOOD PRESSURE: 137 MMHG | HEIGHT: 61 IN | RESPIRATION RATE: 18 BRPM | OXYGEN SATURATION: 97 % | BODY MASS INDEX: 27.38 KG/M2 | WEIGHT: 145 LBS

## 2025-02-11 DIAGNOSIS — Z90.49 STATUS POST CHOLECYSTECTOMY: Primary | ICD-10-CM

## 2025-02-20 ENCOUNTER — OFFICE VISIT (OUTPATIENT)
Dept: GASTROENTEROLOGY | Age: 85
End: 2025-02-20

## 2025-02-20 VITALS
BODY MASS INDEX: 25.98 KG/M2 | HEART RATE: 60 BPM | OXYGEN SATURATION: 90 % | HEIGHT: 61 IN | DIASTOLIC BLOOD PRESSURE: 52 MMHG | WEIGHT: 137.6 LBS | TEMPERATURE: 97.2 F | SYSTOLIC BLOOD PRESSURE: 104 MMHG | RESPIRATION RATE: 16 BRPM

## 2025-02-20 DIAGNOSIS — K80.50 CHOLEDOCHOLITHIASIS: Primary | ICD-10-CM

## 2025-02-20 PROCEDURE — 99024 POSTOP FOLLOW-UP VISIT: CPT | Performed by: NURSE PRACTITIONER

## 2025-02-20 RX ORDER — ACETAMINOPHEN 500 MG
TABLET ORAL PRN
COMMUNITY

## 2025-02-20 NOTE — PROGRESS NOTES
01/18/2025    GFRAA >60 09/14/2022             Assessment & Plan          ASSESSMENT/PLAN:    1. Choledocholithiasis  -     Comprehensive Metabolic Panel; Future    -patient is scheduled for stent removal in March  -CMP to be done prior to procedure      Return for Follow up after the procedure.    An electronic signature was used to authenticate this note.    --MARTÍN Chaney - CNP

## 2025-02-25 ENCOUNTER — OFFICE VISIT (OUTPATIENT)
Dept: PRIMARY CARE CLINIC | Age: 85
End: 2025-02-25
Payer: MEDICARE

## 2025-02-25 VITALS
SYSTOLIC BLOOD PRESSURE: 108 MMHG | OXYGEN SATURATION: 95 % | HEART RATE: 82 BPM | WEIGHT: 137 LBS | BODY MASS INDEX: 25.89 KG/M2 | DIASTOLIC BLOOD PRESSURE: 68 MMHG | TEMPERATURE: 97.5 F | RESPIRATION RATE: 16 BRPM

## 2025-02-25 DIAGNOSIS — R73.03 PRE-DIABETES: Chronic | ICD-10-CM

## 2025-02-25 DIAGNOSIS — N18.31 STAGE 3A CHRONIC KIDNEY DISEASE (HCC): ICD-10-CM

## 2025-02-25 DIAGNOSIS — R70.0 ELEVATED ERYTHROCYTE SEDIMENTATION RATE: ICD-10-CM

## 2025-02-25 DIAGNOSIS — I10 ESSENTIAL HYPERTENSION: Primary | Chronic | ICD-10-CM

## 2025-02-25 DIAGNOSIS — E78.2 MIXED HYPERLIPIDEMIA: Chronic | ICD-10-CM

## 2025-02-25 DIAGNOSIS — K75.0 HEPATIC ABSCESS: ICD-10-CM

## 2025-02-25 DIAGNOSIS — M15.0 PRIMARY OSTEOARTHRITIS INVOLVING MULTIPLE JOINTS: Chronic | ICD-10-CM

## 2025-02-25 PROCEDURE — 3078F DIAST BP <80 MM HG: CPT | Performed by: FAMILY MEDICINE

## 2025-02-25 PROCEDURE — 1159F MED LIST DOCD IN RCRD: CPT | Performed by: FAMILY MEDICINE

## 2025-02-25 PROCEDURE — 1123F ACP DISCUSS/DSCN MKR DOCD: CPT | Performed by: FAMILY MEDICINE

## 2025-02-25 PROCEDURE — 3074F SYST BP LT 130 MM HG: CPT | Performed by: FAMILY MEDICINE

## 2025-02-25 PROCEDURE — 99214 OFFICE O/P EST MOD 30 MIN: CPT | Performed by: FAMILY MEDICINE

## 2025-02-25 SDOH — ECONOMIC STABILITY: FOOD INSECURITY: WITHIN THE PAST 12 MONTHS, YOU WORRIED THAT YOUR FOOD WOULD RUN OUT BEFORE YOU GOT MONEY TO BUY MORE.: NEVER TRUE

## 2025-02-25 SDOH — ECONOMIC STABILITY: FOOD INSECURITY: WITHIN THE PAST 12 MONTHS, THE FOOD YOU BOUGHT JUST DIDN'T LAST AND YOU DIDN'T HAVE MONEY TO GET MORE.: NEVER TRUE

## 2025-02-25 NOTE — PROGRESS NOTES
other individuals in attendance at the appointment consented to the use of AI, including the recording.      An electronic signature was used to authenticate this note.    --Edmond Mg, DO

## 2025-03-03 ENCOUNTER — TELEPHONE (OUTPATIENT)
Dept: PRIMARY CARE CLINIC | Age: 85
End: 2025-03-03

## 2025-03-03 DIAGNOSIS — E78.2 MIXED HYPERLIPIDEMIA: Chronic | ICD-10-CM

## 2025-03-03 DIAGNOSIS — I10 ESSENTIAL HYPERTENSION: Chronic | ICD-10-CM

## 2025-03-03 RX ORDER — METOPROLOL SUCCINATE 50 MG/1
50 TABLET, EXTENDED RELEASE ORAL
Qty: 180 TABLET | Refills: 3 | Status: SHIPPED | OUTPATIENT
Start: 2025-03-03

## 2025-03-03 RX ORDER — LOSARTAN POTASSIUM 50 MG/1
50 TABLET ORAL DAILY
Qty: 90 TABLET | Refills: 3 | Status: SHIPPED | OUTPATIENT
Start: 2025-03-03

## 2025-03-03 RX ORDER — SIMVASTATIN 20 MG
20 TABLET ORAL NIGHTLY
Qty: 90 TABLET | Refills: 3 | Status: SHIPPED | OUTPATIENT
Start: 2025-03-03

## 2025-03-05 NOTE — PROGRESS NOTES
[facility] PRE-ADMISSION TESTING INSTRUCTIONS    The Preadmission Testing patient is instructed accordingly using the following criteria (check applicable):    ARRIVAL INSTRUCTIONS:  [x] Parking the day of Surgery is located in the Main Entrance lot.  Upon entering the door, make an immediate right to the surgery reception desk    [x] Bring photo ID and insurance card    [] Bring in a copy of Living will or Durable Power of  papers.    [x] Please be sure to arrange for a responsible adult to provide transportation to and from the hospital    [x] Please arrange for a responsible adult to be with you for the 24 hour period post procedure due to having anesthesia    [x] If you awake am of surgery not feeling well or have temperature >100 please call [phone]    GENERAL INSTRUCTIONS:    [x] No solid foods after midnight, may have up to 8oz of water until 4 hours prior to surgery. No gum, no candy, no mints. NPO time:1000       [x] You may brush your teeth, do not swallow any toothpaste    [x] Take medications as instructed     [x] Stop herbal supplements and vitamins 5 days prior to procedure    [x] Follow preop dosing of blood thinners per physician instructions    [] Take 1/2 dose of evening insulin, but no insulin after midnight    [] No oral diabetic medications after midnight    [] If diabetic and have low blood sugar or feel symptomatic, take 1-2oz apple juice only    [] Bring inhalers day of surgery    [] Bring urine specimen day of surgery    [x] Shower or bath with soap, lather and rinse well, AM of Surgery, no lotion, powders or creams to surgical site    [] Follow bowel prep as instructed per surgeon    [x] No tobacco products within 24 hours of surgery     [x] No alcohol or illegal drug use, marijuana included, within 24 hours of surgery.    [x] Jewelry, body piercing's, eyeglasses, contact lenses and dentures are not permitted into surgery (bring cases)      [x] Please

## 2025-03-10 ENCOUNTER — HOSPITAL ENCOUNTER (OUTPATIENT)
Age: 85
Setting detail: OUTPATIENT SURGERY
Discharge: HOME | End: 2025-03-10
Attending: STUDENT IN AN ORGANIZED HEALTH CARE EDUCATION/TRAINING PROGRAM | Admitting: STUDENT IN AN ORGANIZED HEALTH CARE EDUCATION/TRAINING PROGRAM
Payer: MEDICARE

## 2025-03-10 ENCOUNTER — ANESTHESIA EVENT (OUTPATIENT)
Dept: ENDOSCOPY | Age: 85
End: 2025-03-10
Payer: MEDICARE

## 2025-03-10 ENCOUNTER — TELEPHONE (OUTPATIENT)
Age: 85
End: 2025-03-10

## 2025-03-10 ENCOUNTER — ANESTHESIA (OUTPATIENT)
Dept: ENDOSCOPY | Age: 85
End: 2025-03-10
Payer: MEDICARE

## 2025-03-10 ENCOUNTER — HOSPITAL ENCOUNTER (OUTPATIENT)
Dept: GENERAL RADIOLOGY | Age: 85
Discharge: HOME | End: 2025-03-12
Attending: STUDENT IN AN ORGANIZED HEALTH CARE EDUCATION/TRAINING PROGRAM
Payer: MEDICARE

## 2025-03-10 VITALS
OXYGEN SATURATION: 98 % | TEMPERATURE: 97.8 F | BODY MASS INDEX: 26.81 KG/M2 | DIASTOLIC BLOOD PRESSURE: 66 MMHG | RESPIRATION RATE: 18 BRPM | HEIGHT: 61 IN | HEART RATE: 56 BPM | SYSTOLIC BLOOD PRESSURE: 154 MMHG | WEIGHT: 142 LBS

## 2025-03-10 PROCEDURE — 74330 X-RAY BILE/PANC ENDOSCOPY: CPT

## 2025-03-10 PROCEDURE — 3609015200 HC ERCP REMOVE CALCULI/DEBRIS BILIARY/PANCREAS DUCT: Performed by: STUDENT IN AN ORGANIZED HEALTH CARE EDUCATION/TRAINING PROGRAM

## 2025-03-10 PROCEDURE — 43264 ERCP REMOVE DUCT CALCULI: CPT | Performed by: STUDENT IN AN ORGANIZED HEALTH CARE EDUCATION/TRAINING PROGRAM

## 2025-03-10 PROCEDURE — 7100000011 HC PHASE II RECOVERY - ADDTL 15 MIN: Performed by: STUDENT IN AN ORGANIZED HEALTH CARE EDUCATION/TRAINING PROGRAM

## 2025-03-10 PROCEDURE — 74328 X-RAY BILE DUCT ENDOSCOPY: CPT | Performed by: STUDENT IN AN ORGANIZED HEALTH CARE EDUCATION/TRAINING PROGRAM

## 2025-03-10 PROCEDURE — C1769 GUIDE WIRE: HCPCS | Performed by: STUDENT IN AN ORGANIZED HEALTH CARE EDUCATION/TRAINING PROGRAM

## 2025-03-10 PROCEDURE — 3700000000 HC ANESTHESIA ATTENDED CARE: Performed by: STUDENT IN AN ORGANIZED HEALTH CARE EDUCATION/TRAINING PROGRAM

## 2025-03-10 PROCEDURE — 2709999900 HC NON-CHARGEABLE SUPPLY: Performed by: STUDENT IN AN ORGANIZED HEALTH CARE EDUCATION/TRAINING PROGRAM

## 2025-03-10 PROCEDURE — 43275 ERCP REMOVE FORGN BODY DUCT: CPT | Performed by: STUDENT IN AN ORGANIZED HEALTH CARE EDUCATION/TRAINING PROGRAM

## 2025-03-10 PROCEDURE — 2720000010 HC SURG SUPPLY STERILE: Performed by: STUDENT IN AN ORGANIZED HEALTH CARE EDUCATION/TRAINING PROGRAM

## 2025-03-10 PROCEDURE — 3700000001 HC ADD 15 MINUTES (ANESTHESIA): Performed by: STUDENT IN AN ORGANIZED HEALTH CARE EDUCATION/TRAINING PROGRAM

## 2025-03-10 PROCEDURE — 6360000002 HC RX W HCPCS: Performed by: NURSE ANESTHETIST, CERTIFIED REGISTERED

## 2025-03-10 PROCEDURE — 6360000004 HC RX CONTRAST MEDICATION: Performed by: STUDENT IN AN ORGANIZED HEALTH CARE EDUCATION/TRAINING PROGRAM

## 2025-03-10 PROCEDURE — 7100000010 HC PHASE II RECOVERY - FIRST 15 MIN: Performed by: STUDENT IN AN ORGANIZED HEALTH CARE EDUCATION/TRAINING PROGRAM

## 2025-03-10 RX ORDER — ONDANSETRON 2 MG/ML
4 INJECTION INTRAMUSCULAR; INTRAVENOUS EVERY 6 HOURS PRN
Status: CANCELLED | OUTPATIENT
Start: 2025-03-10

## 2025-03-10 RX ORDER — ONDANSETRON 4 MG/1
4 TABLET, ORALLY DISINTEGRATING ORAL EVERY 8 HOURS PRN
Status: CANCELLED | OUTPATIENT
Start: 2025-03-10

## 2025-03-10 RX ORDER — SODIUM CHLORIDE 0.9 % (FLUSH) 0.9 %
5-40 SYRINGE (ML) INJECTION EVERY 12 HOURS SCHEDULED
Status: CANCELLED | OUTPATIENT
Start: 2025-03-10

## 2025-03-10 RX ORDER — PROPOFOL 10 MG/ML
INJECTION, EMULSION INTRAVENOUS
Status: DISCONTINUED | OUTPATIENT
Start: 2025-03-10 | End: 2025-03-10 | Stop reason: SDUPTHER

## 2025-03-10 RX ORDER — SODIUM CHLORIDE, SODIUM LACTATE, POTASSIUM CHLORIDE, CALCIUM CHLORIDE 600; 310; 30; 20 MG/100ML; MG/100ML; MG/100ML; MG/100ML
INJECTION, SOLUTION INTRAVENOUS CONTINUOUS
Status: DISCONTINUED | OUTPATIENT
Start: 2025-03-10 | End: 2025-03-10 | Stop reason: HOSPADM

## 2025-03-10 RX ORDER — SODIUM CHLORIDE 0.9 % (FLUSH) 0.9 %
5-40 SYRINGE (ML) INJECTION PRN
Status: CANCELLED | OUTPATIENT
Start: 2025-03-10

## 2025-03-10 RX ORDER — IOPAMIDOL 612 MG/ML
19 INJECTION, SOLUTION INTRAVASCULAR
Status: COMPLETED | OUTPATIENT
Start: 2025-03-10 | End: 2025-03-10

## 2025-03-10 RX ORDER — SODIUM CHLORIDE 9 MG/ML
INJECTION, SOLUTION INTRAVENOUS PRN
Status: CANCELLED | OUTPATIENT
Start: 2025-03-10

## 2025-03-10 RX ADMIN — IOPAMIDOL 19 ML: 612 INJECTION, SOLUTION INTRAVENOUS at 11:03

## 2025-03-10 RX ADMIN — PROPOFOL 160 MG: 10 INJECTION, EMULSION INTRAVENOUS at 10:31

## 2025-03-10 ASSESSMENT — LIFESTYLE VARIABLES: SMOKING_STATUS: 0

## 2025-03-10 NOTE — ANESTHESIA PRE PROCEDURE
Department of Anesthesiology  Preprocedure Note       Name:  Colleen Qiu   Age:  84 y.o.  :  1940                                          MRN:  [id number]         Date:  3/10/2025      Surgeon: Surgeon(s):  Herson Emmanuel MD    Procedure: Procedure(s):  ERCP STENT REMOVAL/EXCHANGE    Medications prior to admission:   Prior to Admission medications    Medication Sig Start Date End Date Taking? Authorizing Provider   diclofenac (CATAFLAM) 50 MG tablet Take 1 tablet by mouth every morning   Yes Jeremie Mari MD   metoprolol succinate (TOPROL XL) 50 MG extended release tablet Take 1 tablet by mouth in the morning and 1 tablet in the evening. 3/3/25   Edmond Mg DO   losartan (COZAAR) 50 MG tablet Take 1 tablet by mouth daily 3/3/25   Edmond Mg DO   simvastatin (ZOCOR) 20 MG tablet Take 1 tablet by mouth nightly 1 po qd --hs--cholesterol 3/3/25   Edmond Mg DO   fluticasone (FLONASE) 50 MCG/ACT nasal spray 2 sprays by Each Nostril route daily 25   Dipika Marcus DO   gabapentin (NEURONTIN) 300 MG capsule Take 1 capsule by mouth every morning.    Jeremie Mari MD   HYDROcodone-acetaminophen (NORCO) 7.5-325 MG per tablet Take 1 tablet by mouth daily as needed for Pain.    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

## 2025-03-10 NOTE — DISCHARGE INSTRUCTIONS
Recommendations:  -The patient will be observed post procedure until all discharge criteria are met.    -Patient has a contact number available for emergencies.  The signs and symptoms of potential delayed complications were discussed with the patient.    -Return to normal activities tomorrow.    -Written discharge instructions were provided to the patient.    -Clinic appointment to be scheduled on PRN basis.

## 2025-03-10 NOTE — TELEPHONE ENCOUNTER
Dr Paco Holliday, resident at [facility] called to discuss findings on MRI with pt liver.   He didn't know if dr gamboa was able to view this at all when he did her ercp.  Messaged this to dr gamboa.  Dr Holliday phone number is [phone].

## 2025-03-10 NOTE — ANESTHESIA POSTPROCEDURE EVALUATION
Department of Anesthesiology  Postprocedure Note    Patient: Colleen Qiu  MRN: [id number]  YOB: 1940  Date of evaluation: 3/10/2025    Procedure Summary       Date: 03/10/25 Room / Location: [facility]    Anesthesia Start: 1023 Anesthesia Stop: 1049    Procedure: ERCP STENT REMOVAL Diagnosis:       Choledocholithiasis      (Choledocholithiasis [K80.50])    Surgeons: Herson Emmanuel MD Responsible Provider: Luiz Argueta MD    Anesthesia Type: MAC ASA Status: 3            Anesthesia Type: No value filed.    Suzi Phase I: Suzi Score: 10    Suzi Phase II: Suzi Score: 10    Anesthesia Post Evaluation    Patient location during evaluation: PACU  Patient participation: complete - patient participated  Level of consciousness: awake and alert  Airway patency: patent  Nausea & Vomiting: no nausea and no vomiting  Cardiovascular status: hemodynamically stable  Respiratory status: acceptable  Hydration status: euvolemic  There was medical reason for not using a multimodal analgesia pain management approach.Pain management: adequate    No notable events documented.

## 2025-03-10 NOTE — BRIEF OP NOTE
Brief Postoperative Note      Patient: Colleen Qiu  YOB: 1940  MRN: [id number]    Date of Procedure: 3/10/2025    Pre-Op Diagnosis Codes:      * Choledocholithiasis [K80.50]    Post-Op Diagnosis: Same.        Procedure(s):  ERCP STENT REMOVAL  ENDOSCOPIC RETROGRADE CHOLANGIOPANCREATOGRAPHY STONE REMOVAL    Surgeon(s):  Herson Emmanuel MD    Assistant:  * No surgical staff found *    Anesthesia: Monitor Anesthesia Care    Estimated Blood Loss (mL): less than 50     Complications: None    Specimens:   * No specimens in log *    Implants:  * No implants in log *      Drains:   [REMOVED] External Urinary Catheter (Removed)       Findings:    Stent removed.  Balloon sweeps with removal of debris.  Occlusion cholangiogram with no further stones and normal ducts.     Electronically signed by Herson Emmanuel MD on 3/10/2025 at 11:22 AM

## 2025-03-10 NOTE — H&P
[facility]   Gastroenterology, Hepatology, &  Advanced Endoscopy    Consult       ASSESSMENT AND PLAN:    84y/F w/ history of choledocholithiasis s/p ERCP with stent placement and interval CCY who presents for biliary stent removal.     PLAN:  ERCP        HISTORY OF PRESENT ILLNESS:      Ms. Colleen Qiu is an 84y/F w/ history of choledocholithiasis s/p ERCP with stent placement and interval CCY who presents for biliary stent removal.     Past Medical History:        Diagnosis Date    PERCY (acute kidney injury) 01/11/2025    Atypical mole 03/16/2021    Cholecystitis 01/10/2025    Choledocholithiasis 12/19/2024    Chronic obstructive lung disease (HCC)     Controlled type 2 diabetes mellitus without complication (HCC) 01/11/2025    Diet-controlled diabetes mellitus (HCC) 09/12/2019    DJD (degenerative joint disease)     multiple joints    Heart disease     benign hypertensive heart disease without congestive heart failure. follows with PCP    Hyperlipidemia     Hypertension     Hypokalemia 01/15/2025    Hyponatremia 01/11/2025    Hypothyroidism     Severe protein-calorie malnutrition 01/17/2025    Symptomatic cholelithiasis 12/03/2024    Thrombocytopenia 01/11/2025    Type 2 diabetes mellitus without complication (HCC)      Past Surgical History:        Procedure Laterality Date    ANKLE SURGERY Right     CHOLECYSTECTOMY N/A 1/17/2025    LAPAROSCOPIC ROBOTIC XI ASSISTED CHOLECYSTECTOMY performed by Kiet Chambers MD at [facility] OR    ERCP N/A 1/3/2025    ENDOSCOPIC RETROGRADE CHOLANGIOPANCREATOGRAPHY STONE REMOVAL performed by Herson Emmanuel MD at [facility] ENDOSCOPY    ERCP N/A 1/3/2025    ENDOSCOPIC RETROGRADE CHOLANGIOPANCREATOGRAPHY STENT INSERTION performed by Herson Emmanuel MD at [facility] ENDOSCOPY    SHOULDER SURGERY Left      Social History:    TOBACCO:   reports that she quit smoking about 13 years ago. Her smoking use included cigarettes. She started smoking about 38 years ago. She has never used

## 2025-03-12 ENCOUNTER — OFFICE VISIT (OUTPATIENT)
Dept: SURGERY | Age: 85
End: 2025-03-12
Payer: MEDICARE

## 2025-03-12 VITALS
RESPIRATION RATE: 18 BRPM | DIASTOLIC BLOOD PRESSURE: 81 MMHG | HEART RATE: 72 BPM | HEIGHT: 61 IN | BODY MASS INDEX: 25.98 KG/M2 | OXYGEN SATURATION: 96 % | SYSTOLIC BLOOD PRESSURE: 141 MMHG | WEIGHT: 137.6 LBS

## 2025-03-12 DIAGNOSIS — R16.0 LIVER MASS: ICD-10-CM

## 2025-03-12 DIAGNOSIS — K76.9 LIVER LESION: Primary | ICD-10-CM

## 2025-03-12 PROCEDURE — 3079F DIAST BP 80-89 MM HG: CPT | Performed by: SURGERY

## 2025-03-12 PROCEDURE — 1159F MED LIST DOCD IN RCRD: CPT | Performed by: SURGERY

## 2025-03-12 PROCEDURE — 99214 OFFICE O/P EST MOD 30 MIN: CPT | Performed by: SURGERY

## 2025-03-12 PROCEDURE — 3077F SYST BP >= 140 MM HG: CPT | Performed by: SURGERY

## 2025-03-12 PROCEDURE — 1123F ACP DISCUSS/DSCN MKR DOCD: CPT | Performed by: SURGERY

## 2025-03-12 RX ORDER — DICLOFENAC POTASSIUM 50 MG/1
50 TABLET, FILM COATED ORAL EVERY MORNING
Qty: 90 TABLET | Refills: 3 | Status: SHIPPED | OUTPATIENT
Start: 2025-03-12

## 2025-03-12 NOTE — TELEPHONE ENCOUNTER
Name of Medication(s) Requested:  Requested Prescriptions      No prescriptions requested or ordered in this encounter       Medication is on current medication list Yes    Dosage and directions were verified? Yes    Quantity verified: 90 day supply     Pharmacy Verified?  Yes    Last Appointment:  2/25/2025    Future appts:  Future Appointments   Date Time Provider Department Center   3/12/2025  1:45 PM Kiet Chambers MD [place] [facility]   3/18/2025  1:15 PM Edmond Mg DO [place] [facility] DEP   3/27/2025  1:00 PM Dipika Marcus DO [place] [facility]   4/8/2025 10:00 AM Edmond Mg DO [place] [facility] DE[facility]        (If no appt send self scheduling link. .REFILLAPPT)  Scheduling request sent?     [] Yes  [] No    Does patient need updated?  [] Yes  [] No

## 2025-03-13 NOTE — PROGRESS NOTES
[facility] Surgery Clinic Note    Assessment & Plan  1. Hepatic lesion - etiology/prognosis unclear.  The hepatic lesion is concerning for potential malignancy, possibly a necrotic tumor or abscess with her history of melanoma. The patient is asymptomatic with no fever, nausea, vomiting, or abdominal pain. Previous MRI scans showed spots on the brain, liver, and lung, which resolved until the recent scan. The patient has lost 70 pounds over 2 years and experiences tiredness, hand swelling, and lymphedema in her legs. A biopsy is recommended to determine the nature of the lesion. Admission was recommended due to concern for SIRS development post-procedure or need for IV antibiotics if this is abscess, not tumor. The patient prefers an outpatient procedure. A CT scan will be ordered to guide the biopsy. Blood work, including blood cultures, will be done before the procedure to ensure no bacterial infection. If the lesion is an abscess, a drain will be placed, and IV antibiotics will be administered. If the patient becomes sick pre or post-procedure, she should go to the ER.    The above diagnosis represents a disease process posing potential threat to life.      PROCEDURE  The patient underwent an ERCP on 02/24/2025, during which a stent was removed. She also had a cholecystectomy on 01/06/2025.    Return for Results.    Colleen was seen today for follow-up.    Diagnoses and all orders for this visit:    Liver lesion  -     CBC with Auto Differential; Future  -     Comprehensive Metabolic Panel; Future  -     Protime-INR; Future  -     Culture, Blood 1; Future  -     Culture, Blood 2; Future  -     Cancel: CT BIOPSY ABDOMEN RETROPERITONEUM; Future  -     CT BIOPSY ABDOMEN RETROPERITONEUM; Future    Liver mass  -     CBC with Auto Differential; Future  -     Comprehensive Metabolic Panel; Future  -     Protime-INR; Future  -     Culture, Blood 1; Future  -     Culture, Blood 2; Future  -     Cancel: CT BIOPSY

## 2025-03-18 ENCOUNTER — OFFICE VISIT (OUTPATIENT)
Dept: PRIMARY CARE CLINIC | Age: 85
End: 2025-03-18
Payer: MEDICARE

## 2025-03-18 VITALS
HEART RATE: 56 BPM | WEIGHT: 140 LBS | HEIGHT: 59 IN | BODY MASS INDEX: 28.22 KG/M2 | RESPIRATION RATE: 16 BRPM | TEMPERATURE: 98.1 F | OXYGEN SATURATION: 98 %

## 2025-03-18 DIAGNOSIS — Z00.00 MEDICARE ANNUAL WELLNESS VISIT, SUBSEQUENT: Primary | ICD-10-CM

## 2025-03-18 DIAGNOSIS — C43.59 MALIGNANT MELANOMA OF TORSO EXCLUDING BREAST: ICD-10-CM

## 2025-03-18 DIAGNOSIS — I10 ESSENTIAL HYPERTENSION: Chronic | ICD-10-CM

## 2025-03-18 DIAGNOSIS — E78.2 MIXED HYPERLIPIDEMIA: Chronic | ICD-10-CM

## 2025-03-18 DIAGNOSIS — R73.03 PRE-DIABETES: Chronic | ICD-10-CM

## 2025-03-18 DIAGNOSIS — M15.0 PRIMARY OSTEOARTHRITIS INVOLVING MULTIPLE JOINTS: Chronic | ICD-10-CM

## 2025-03-18 DIAGNOSIS — N18.31 STAGE 3A CHRONIC KIDNEY DISEASE (HCC): ICD-10-CM

## 2025-03-18 PROBLEM — K80.50 CHOLEDOCHOLITHIASIS: Status: RESOLVED | Noted: 2025-01-06 | Resolved: 2025-03-18

## 2025-03-18 PROCEDURE — 1123F ACP DISCUSS/DSCN MKR DOCD: CPT | Performed by: FAMILY MEDICINE

## 2025-03-18 PROCEDURE — 1159F MED LIST DOCD IN RCRD: CPT | Performed by: FAMILY MEDICINE

## 2025-03-18 PROCEDURE — G0439 PPPS, SUBSEQ VISIT: HCPCS | Performed by: FAMILY MEDICINE

## 2025-03-18 ASSESSMENT — PATIENT HEALTH QUESTIONNAIRE - PHQ9
1. LITTLE INTEREST OR PLEASURE IN DOING THINGS: NOT AT ALL
SUM OF ALL RESPONSES TO PHQ QUESTIONS 1-9: 0
SUM OF ALL RESPONSES TO PHQ QUESTIONS 1-9: 0
2. FEELING DOWN, DEPRESSED OR HOPELESS: NOT AT ALL
SUM OF ALL RESPONSES TO PHQ QUESTIONS 1-9: 0
SUM OF ALL RESPONSES TO PHQ QUESTIONS 1-9: 0

## 2025-03-18 NOTE — PATIENT INSTRUCTIONS
attack. These may include:    Chest pain or pressure, or a strange feeling in the chest.     Sweating.     Shortness of breath.     Pain, pressure, or a strange feeling in the back, neck, jaw, or upper belly or in one or both shoulders or arms.     Lightheadedness or sudden weakness.     A fast or irregular heartbeat.   After you call 911, the  may tell you to chew 1 adult-strength or 2 to 4 low-dose aspirin. Wait for an ambulance. Do not try to drive yourself.  Watch closely for changes in your health, and be sure to contact your doctor if you have any problems.  Where can you learn more?  Go to https://www.[employer].net/patientEd and enter F075 to learn more about \"A Healthy Heart: Care Instructions.\"  Current as of: July 31, 2024  Content Version: 14.4  © 3325-5523 [employer].   Care instructions adapted under license by [employer]. If you have questions about a medical condition or this instruction, always ask your healthcare professional. [employer], disclaims any warranty or liability for your use of this information.    Personalized Preventive Plan for Colleen Qiu - 3/18/2025  Medicare offers a range of preventive health benefits. Some of the tests and screenings are paid in full while other may be subject to a deductible, co-insurance, and/or copay.  Some of these benefits include a comprehensive review of your medical history including lifestyle, illnesses that may run in your family, and various assessments and screenings as appropriate.  After reviewing your medical record and screening and assessments performed today your provider may have ordered immunizations, labs, imaging, and/or referrals for you.  A list of these orders (if applicable) as well as your Preventive Care list are included within your After Visit Summary for your review.

## 2025-03-18 NOTE — PROGRESS NOTES
willingness to undergo diagnosis and treatment. Counseled on the importance of maintaining cigarette smoking abstinence and cessation. The patient has a history of >20 pack years and is either still smoking or quit within the last 15 years. There are no signs or symptoms of lung cancer.            Objective   Vitals:    03/18/25 1303   Pulse: 56   Resp: 16   Temp: 98.1 °F (36.7 °C)   TempSrc: Temporal   SpO2: 98%   Weight: 63.5 kg (140 lb)   Height: 1.486 m (4' 10.5\")      Body mass index is 28.76 kg/m².      Physical Exam  Lungs are clear.  Heart reg   Abd soft  Vital Signs  Blood pressure is 122/68.              Allergies   Allergen Reactions    Pcn [Penicillins] Itching and Rash     Prior to Visit Medications    Medication Sig Taking? Authorizing Provider   diclofenac (CATAFLAM) 50 MG tablet Take 1 tablet by mouth every morning  Edmond Mg DO   metoprolol succinate (TOPROL XL) 50 MG extended release tablet Take 1 tablet by mouth in the morning and 1 tablet in the evening.  Edmond Mg DO   losartan (COZAAR) 50 MG tablet Take 1 tablet by mouth daily  Edmond Mg DO   simvastatin (ZOCOR) 20 MG tablet Take 1 tablet by mouth nightly 1 po qd --hs--cholesterol  Edmond Mg DO   fluticasone (FLONASE) 50 MCG/ACT nasal spray 2 sprays by Each Nostril route daily  Dipika Marcus DO   gabapentin (NEURONTIN) 300 MG capsule Take 1 capsule by mouth every morning.  Provider, MD Jeremie   HYDROcodone-acetaminophen (NORCO) 7.5-325 MG per tablet Take 1 tablet by mouth daily as needed for Pain.  Provider, MD Renee Chao (Including outside providers/suppliers regularly involved in providing care):   Patient Care Team:  Edmond Mg DO as PCP - General (Family Medicine)  Edmond Mg DO as PCP - Empaneled Provider     Recommendations for Preventive Services Due: see orders and patient instructions/AVS.  Recommended screening schedule for the next 5-10 years is provided to

## 2025-03-20 ENCOUNTER — HOSPITAL ENCOUNTER (OUTPATIENT)
Dept: CT IMAGING | Age: 85
Discharge: HOME | End: 2025-03-22
Payer: MEDICARE

## 2025-03-20 ENCOUNTER — HOSPITAL ENCOUNTER (OUTPATIENT)
Age: 85
Discharge: HOME | End: 2025-03-20
Payer: MEDICARE

## 2025-03-20 VITALS
DIASTOLIC BLOOD PRESSURE: 64 MMHG | HEART RATE: 85 BPM | RESPIRATION RATE: 18 BRPM | OXYGEN SATURATION: 98 % | SYSTOLIC BLOOD PRESSURE: 136 MMHG

## 2025-03-20 DIAGNOSIS — R16.0 LIVER MASS: ICD-10-CM

## 2025-03-20 DIAGNOSIS — K76.9 LIVER LESION: ICD-10-CM

## 2025-03-20 LAB
ALBUMIN SERPL-MCNC: 3 G/DL (ref 3.5–5.2)
ALP SERPL-CCNC: 249 U/L (ref 35–104)
ALT SERPL-CCNC: 8 U/L (ref 0–32)
ANION GAP SERPL CALCULATED.3IONS-SCNC: 13 MMOL/L (ref 7–16)
AST SERPL-CCNC: 15 U/L (ref 0–31)
BASOPHILS # BLD: 0.03 K/UL (ref 0–0.2)
BASOPHILS NFR BLD: 0 % (ref 0–2)
BILIRUB SERPL-MCNC: 0.8 MG/DL (ref 0–1.2)
BUN SERPL-MCNC: 21 MG/DL (ref 6–23)
CALCIUM SERPL-MCNC: 9.5 MG/DL (ref 8.6–10.2)
CHLORIDE SERPL-SCNC: 99 MMOL/L (ref 98–107)
CO2 SERPL-SCNC: 23 MMOL/L (ref 22–29)
CREAT SERPL-MCNC: 1 MG/DL (ref 0.5–1)
EOSINOPHIL # BLD: 0.28 K/UL (ref 0.05–0.5)
EOSINOPHILS RELATIVE PERCENT: 4 % (ref 0–6)
ERYTHROCYTE [DISTWIDTH] IN BLOOD BY AUTOMATED COUNT: 18.3 % (ref 11.5–15)
GFR, ESTIMATED: 59 ML/MIN/1.73M2
GLUCOSE SERPL-MCNC: 88 MG/DL (ref 74–99)
HCT VFR BLD AUTO: 27.8 % (ref 34–48)
HGB BLD-MCNC: 8.2 G/DL (ref 11.5–15.5)
IMM GRANULOCYTES # BLD AUTO: <0.03 K/UL (ref 0–0.58)
IMM GRANULOCYTES NFR BLD: 0 % (ref 0–5)
INR PPP: 1.2
LYMPHOCYTES NFR BLD: 1.28 K/UL (ref 1.5–4)
LYMPHOCYTES RELATIVE PERCENT: 17 % (ref 20–42)
MCH RBC QN AUTO: 25.5 PG (ref 26–35)
MCHC RBC AUTO-ENTMCNC: 29.5 G/DL (ref 32–34.5)
MCV RBC AUTO: 86.3 FL (ref 80–99.9)
MONOCYTES NFR BLD: 0.54 K/UL (ref 0.1–0.95)
MONOCYTES NFR BLD: 7 % (ref 2–12)
NEUTROPHILS NFR BLD: 71 % (ref 43–80)
NEUTS SEG NFR BLD: 5.28 K/UL (ref 1.8–7.3)
PLATELET # BLD AUTO: 290 K/UL (ref 130–450)
PMV BLD AUTO: 10.6 FL (ref 7–12)
POTASSIUM SERPL-SCNC: 3.8 MMOL/L (ref 3.5–5)
PROT SERPL-MCNC: 7.4 G/DL (ref 6.4–8.3)
PROTHROMBIN TIME: 13 SEC (ref 9.3–12.4)
RBC # BLD AUTO: 3.22 M/UL (ref 3.5–5.5)
SODIUM SERPL-SCNC: 135 MMOL/L (ref 132–146)
WBC OTHER # BLD: 7.4 K/UL (ref 4.5–11.5)

## 2025-03-20 PROCEDURE — 6360000002 HC RX W HCPCS: Performed by: RADIOLOGY

## 2025-03-20 PROCEDURE — 2709999900 CT NEEDLE BIOPSY LIVER PERCUTANEOUS

## 2025-03-20 PROCEDURE — 85610 PROTHROMBIN TIME: CPT

## 2025-03-20 PROCEDURE — 36415 COLL VENOUS BLD VENIPUNCTURE: CPT

## 2025-03-20 PROCEDURE — 2500000003 HC RX 250 WO HCPCS: Performed by: RADIOLOGY

## 2025-03-20 PROCEDURE — 80053 COMPREHEN METABOLIC PANEL: CPT

## 2025-03-20 PROCEDURE — 85025 COMPLETE CBC W/AUTO DIFF WBC: CPT

## 2025-03-20 RX ORDER — ONDANSETRON 2 MG/ML
INJECTION INTRAMUSCULAR; INTRAVENOUS PRN
Status: COMPLETED | OUTPATIENT
Start: 2025-03-20 | End: 2025-03-20

## 2025-03-20 RX ORDER — FENTANYL CITRATE 50 UG/ML
INJECTION, SOLUTION INTRAMUSCULAR; INTRAVENOUS PRN
Status: COMPLETED | OUTPATIENT
Start: 2025-03-20 | End: 2025-03-20

## 2025-03-20 RX ORDER — ONDANSETRON 2 MG/ML
4 INJECTION INTRAMUSCULAR; INTRAVENOUS EVERY 6 HOURS PRN
Status: DISCONTINUED | OUTPATIENT
Start: 2025-03-20 | End: 2025-03-23 | Stop reason: HOSPADM

## 2025-03-20 RX ORDER — LIDOCAINE HYDROCHLORIDE 20 MG/ML
INJECTION, SOLUTION INFILTRATION; PERINEURAL PRN
Status: COMPLETED | OUTPATIENT
Start: 2025-03-20 | End: 2025-03-20

## 2025-03-20 RX ADMIN — Medication 1 KIT: at 14:34

## 2025-03-20 RX ADMIN — FENTANYL CITRATE 25 MCG: 50 INJECTION, SOLUTION INTRAMUSCULAR; INTRAVENOUS at 14:25

## 2025-03-20 RX ADMIN — ONDANSETRON 4 MG: 2 INJECTION INTRAMUSCULAR; INTRAVENOUS at 14:25

## 2025-03-20 RX ADMIN — LIDOCAINE HYDROCHLORIDE 10 ML: 20 INJECTION, SOLUTION INFILTRATION; PERINEURAL at 14:26

## 2025-03-20 NOTE — OR NURSING
Patient brought into CT room and procedure explained by Dr. Canseco. Procedural consent obtained. Patient placed supine on CT table with O2 and patient placed on monitor. Using CT, needle inserted and biopsy obtained from liver by Dr. Canseco. Patient re-scanned and imaging reviewed by Dr. Canseco. Biopsy site cleaned and dressing applied. Patient report called to PACU and then escorted to Stage II recovery.

## 2025-03-23 PROBLEM — Z98.890 HISTORY OF BILIARY DUCT STENT PLACEMENT: Status: ACTIVE | Noted: 2025-03-23

## 2025-03-27 ENCOUNTER — OFFICE VISIT (OUTPATIENT)
Dept: ENT CLINIC | Age: 85
End: 2025-03-27

## 2025-03-27 ENCOUNTER — PROCEDURE VISIT (OUTPATIENT)
Dept: AUDIOLOGY | Age: 85
End: 2025-03-27
Payer: MEDICARE

## 2025-03-27 VITALS
HEIGHT: 58 IN | BODY MASS INDEX: 28.34 KG/M2 | TEMPERATURE: 97 F | OXYGEN SATURATION: 95 % | WEIGHT: 135 LBS | DIASTOLIC BLOOD PRESSURE: 60 MMHG | HEART RATE: 56 BPM | SYSTOLIC BLOOD PRESSURE: 157 MMHG

## 2025-03-27 DIAGNOSIS — H65.92 MIDDLE EAR EFFUSION, LEFT: ICD-10-CM

## 2025-03-27 DIAGNOSIS — H69.93 DYSFUNCTION OF BOTH EUSTACHIAN TUBES: Primary | ICD-10-CM

## 2025-03-27 DIAGNOSIS — J30.89 SEASONAL ALLERGIC RHINITIS DUE TO OTHER ALLERGIC TRIGGER: ICD-10-CM

## 2025-03-27 DIAGNOSIS — H65.92 MEE (MIDDLE EAR EFFUSION), LEFT: ICD-10-CM

## 2025-03-27 DIAGNOSIS — H90.A32 MIXED CONDUCTIVE AND SENSORINEURAL HEARING LOSS OF LEFT EAR WITH RESTRICTED HEARING OF RIGHT EAR: ICD-10-CM

## 2025-03-27 DIAGNOSIS — R42 DYSEQUILIBRIUM: Primary | ICD-10-CM

## 2025-03-27 DIAGNOSIS — R09.81 NASAL CONGESTION: ICD-10-CM

## 2025-03-27 DIAGNOSIS — H69.93 EUSTACHIAN TUBE DYSFUNCTION, BILATERAL: ICD-10-CM

## 2025-03-27 PROCEDURE — 92553 AUDIOMETRY AIR & BONE: CPT | Performed by: AUDIOLOGIST

## 2025-03-27 PROCEDURE — 92567 TYMPANOMETRY: CPT | Performed by: AUDIOLOGIST

## 2025-03-27 ASSESSMENT — ENCOUNTER SYMPTOMS
RHINORRHEA: 0
ALLERGIC/IMMUNOLOGIC NEGATIVE: 1
RESPIRATORY NEGATIVE: 1
SORE THROAT: 0

## 2025-03-27 NOTE — PROGRESS NOTES
Department of Otolaryngology  Office Consult Note        Subjective:        Chief Complaint:  had concerns including Other ( OFFICE VISIT - 1 mo tymp and PT therapy; confirmed 03/26 -bml/).     Patient ID: Colleen Qiu is a 84 y.o. female.    HPI: Patient presents as  follow-up for dysequilibrium and ear issues.  Patient had persistent left middle ear effusion from the last visit and disequilibrium type symptoms.  She reports some mild improvement of her off-balance, she is still using a walker.  There is no change in her left ear it is still full with hearing loss.  She feels she has hearing loss at baseline but it is currently worse and unchanged in the last visit.    .She is currently undergoing melanoma infusion treatments and believe this may be contributing to it.  She feels lightheaded and off balance when she is moving especially when lifting her arms over her head.  Denies room spinning sensation, vertigo, hearing loss, aural fullness, tinnitus with these associated symptoms.  She does however note her hearing has progressively become worse over time.  No room spinnning   No headache   No loud noise exposure   No otorrhea otalagia surery to ear     Review of Systems   Constitutional: Negative.    HENT:  Negative for congestion, ear discharge, ear pain, hearing loss, rhinorrhea, sneezing and sore throat.    Respiratory: Negative.     Cardiovascular:  Negative for chest pain.   Musculoskeletal: Negative.    Skin: Negative.    Allergic/Immunologic: Negative.    Neurological: Negative.    Psychiatric/Behavioral: Negative.     All other systems reviewed and are negative.        Past Medical History:   Diagnosis Date    PERCY (acute kidney injury) 01/11/2025    Atypical mole 03/16/2021    Cholecystitis 01/10/2025    Choledocholithiasis 12/19/2024    Choledocholithiasis 01/06/2025    Chronic obstructive lung disease (HCC)     Controlled type 2 diabetes mellitus without complication (HCC) 01/11/2025

## 2025-03-28 NOTE — PROGRESS NOTES
This patient was referred for audiometric/tympanometric testing by Dr. Marcus due to hearing loss and middle ear effusion.      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   Reliability was good. Speech testing not done today.     Tympanometry revealed negative middle ear pressure (-210 daPa), in the right ear. Left ear revealed flat tympanogram.           The results were reviewed with the patient and physician.     Recommendations for follow up will be made pending ordering provider consult.    Humberto Beth/CCC-A  [place] Lic # [id number]

## 2025-04-01 LAB — SURGICAL PATHOLOGY REPORT: NORMAL

## 2025-05-01 ENCOUNTER — OFFICE VISIT (OUTPATIENT)
Dept: ENT CLINIC | Age: 85
End: 2025-05-01
Payer: MEDICARE

## 2025-05-01 ENCOUNTER — PROCEDURE VISIT (OUTPATIENT)
Dept: AUDIOLOGY | Age: 85
End: 2025-05-01

## 2025-05-01 VITALS
TEMPERATURE: 97.3 F | OXYGEN SATURATION: 95 % | WEIGHT: 133.6 LBS | BODY MASS INDEX: 28.04 KG/M2 | HEIGHT: 58 IN | HEART RATE: 60 BPM

## 2025-05-01 DIAGNOSIS — H69.93 DYSFUNCTION OF BOTH EUSTACHIAN TUBES: Primary | ICD-10-CM

## 2025-05-01 DIAGNOSIS — J30.89 SEASONAL ALLERGIC RHINITIS DUE TO OTHER ALLERGIC TRIGGER: ICD-10-CM

## 2025-05-01 DIAGNOSIS — R42 DYSEQUILIBRIUM: Primary | ICD-10-CM

## 2025-05-01 DIAGNOSIS — R09.81 NASAL CONGESTION: ICD-10-CM

## 2025-05-01 DIAGNOSIS — H69.93 EUSTACHIAN TUBE DYSFUNCTION, BILATERAL: ICD-10-CM

## 2025-05-01 PROCEDURE — 99213 OFFICE O/P EST LOW 20 MIN: CPT | Performed by: OTOLARYNGOLOGY

## 2025-05-01 PROCEDURE — 1159F MED LIST DOCD IN RCRD: CPT | Performed by: OTOLARYNGOLOGY

## 2025-05-01 PROCEDURE — 1123F ACP DISCUSS/DSCN MKR DOCD: CPT | Performed by: OTOLARYNGOLOGY

## 2025-05-01 RX ORDER — CIPROFLOXACIN 500 MG/1
500 TABLET, FILM COATED ORAL 2 TIMES DAILY
COMMUNITY
Start: 2025-04-25 | End: 2025-05-05

## 2025-05-01 ASSESSMENT — ENCOUNTER SYMPTOMS
RESPIRATORY NEGATIVE: 1
SORE THROAT: 0
ALLERGIC/IMMUNOLOGIC NEGATIVE: 1
RHINORRHEA: 0

## 2025-05-01 NOTE — PROGRESS NOTES
Department of Otolaryngology  Office Consult Note        Subjective:        Chief Complaint:  had concerns including Other (OFFICE VISIT - 1 month myringotomy recheck with tymp; confirmed 4/30 -b).     Patient ID: Colleen Qiu is a 85 y.o. female.    HPI: Patient presents as  follow-up for dysequilibrium and ear issues.  She had left myringotomy done for serous middle ear effusion at the last visit with improvement of symptoms.  She feels like her muffled hearing is slowly improving pairing her balance is slowly getting better with home exercises.  No significant changes since the last visit.      Review of Systems   Constitutional: Negative.    HENT:  Positive for hearing loss. Negative for congestion, ear discharge, ear pain, rhinorrhea, sneezing and sore throat.    Respiratory: Negative.     Cardiovascular:  Negative for chest pain.   Musculoskeletal: Negative.    Skin: Negative.    Allergic/Immunologic: Negative.    Neurological:  Positive for dizziness and weakness.   Psychiatric/Behavioral: Negative.     All other systems reviewed and are negative.        Past Medical History:   Diagnosis Date    PERCY (acute kidney injury) 01/11/2025    Atypical mole 03/16/2021    Cholecystitis 01/10/2025    Choledocholithiasis 12/19/2024    Choledocholithiasis 01/06/2025    Chronic obstructive lung disease (HCC)     Controlled type 2 diabetes mellitus without complication (HCC) 01/11/2025    Diet-controlled diabetes mellitus (HCC) 09/12/2019    DJD (degenerative joint disease)     multiple joints    Heart disease     benign hypertensive heart disease without congestive heart failure. follows with PCP    Hyperlipidemia     Hypertension     Hypokalemia 01/15/2025    Hyponatremia 01/11/2025    Hypothyroidism     Severe protein-calorie malnutrition 01/17/2025    Symptomatic cholelithiasis 12/03/2024    Thrombocytopenia 01/11/2025    Type 2 diabetes mellitus without complication (HCC)      Past Surgical History:

## 2025-05-01 NOTE — PROGRESS NOTES
This patient was referred for tympanometric testing by Dr. Marcus due to eustachian tube dysfunction.     Tympanometry revealed negative middle ear pressure (-217 daPa), in the right ear and flat tympanogram with large physical volume, in the left ear.    The results were reviewed with the patient and ordering provider.     Recommendations for follow up will be made pending ordering provider consult.    Humberto Posadas/CCC-A  [place] Lic [id number]  Electronically signed by Humberto Posadas on 5/1/2025 at 1:38 PM

## 2025-07-16 ENCOUNTER — OFFICE VISIT (OUTPATIENT)
Dept: PRIMARY CARE CLINIC | Age: 85
End: 2025-07-16
Payer: MEDICARE

## 2025-07-16 VITALS
HEART RATE: 70 BPM | OXYGEN SATURATION: 97 % | HEIGHT: 58 IN | BODY MASS INDEX: 32.95 KG/M2 | DIASTOLIC BLOOD PRESSURE: 78 MMHG | TEMPERATURE: 97.6 F | SYSTOLIC BLOOD PRESSURE: 138 MMHG | WEIGHT: 157 LBS

## 2025-07-16 DIAGNOSIS — I50.43 SYSTOLIC AND DIASTOLIC CHF, ACUTE ON CHRONIC (HCC): ICD-10-CM

## 2025-07-16 DIAGNOSIS — C43.59 MALIGNANT MELANOMA OF TORSO EXCLUDING BREAST (HCC): ICD-10-CM

## 2025-07-16 DIAGNOSIS — E53.8 VITAMIN B 12 DEFICIENCY: ICD-10-CM

## 2025-07-16 DIAGNOSIS — E55.9 VITAMIN D DEFICIENCY: ICD-10-CM

## 2025-07-16 DIAGNOSIS — N18.31 STAGE 3A CHRONIC KIDNEY DISEASE (HCC): ICD-10-CM

## 2025-07-16 DIAGNOSIS — I10 ESSENTIAL HYPERTENSION: ICD-10-CM

## 2025-07-16 DIAGNOSIS — I10 ESSENTIAL HYPERTENSION: Primary | ICD-10-CM

## 2025-07-16 DIAGNOSIS — D50.8 OTHER IRON DEFICIENCY ANEMIA: ICD-10-CM

## 2025-07-16 LAB
ALBUMIN: 3.4 G/DL (ref 3.5–5.2)
ALP BLD-CCNC: 195 U/L (ref 35–104)
ALT SERPL-CCNC: 23 U/L (ref 0–35)
ANION GAP SERPL CALCULATED.3IONS-SCNC: 10 MMOL/L (ref 7–16)
AST SERPL-CCNC: 22 U/L (ref 0–35)
BILIRUB SERPL-MCNC: 1 MG/DL (ref 0–1.2)
BUN BLDV-MCNC: 22 MG/DL (ref 8–23)
CALCIUM SERPL-MCNC: 9 MG/DL (ref 8.8–10.2)
CHLORIDE BLD-SCNC: 100 MMOL/L (ref 98–107)
CO2: 27 MMOL/L (ref 22–29)
CREAT SERPL-MCNC: 0.7 MG/DL (ref 0.5–1)
GFR, ESTIMATED: 85 ML/MIN/1.73M2
GLUCOSE BLD-MCNC: 126 MG/DL (ref 74–99)
IRON: 33 UG/DL (ref 37–145)
NT PRO BNP: 3538 PG/ML (ref 0–450)
POTASSIUM SERPL-SCNC: 3.7 MMOL/L (ref 3.5–5.1)
SODIUM BLD-SCNC: 137 MMOL/L (ref 136–145)
TOTAL PROTEIN: 6.1 G/DL (ref 6.4–8.3)
VITAMIN B-12: 317 PG/ML (ref 232–1245)
VITAMIN D 25-HYDROXY: 23.2 NG/ML (ref 30–100)

## 2025-07-16 PROCEDURE — 1159F MED LIST DOCD IN RCRD: CPT | Performed by: FAMILY MEDICINE

## 2025-07-16 PROCEDURE — 1123F ACP DISCUSS/DSCN MKR DOCD: CPT | Performed by: FAMILY MEDICINE

## 2025-07-16 PROCEDURE — 3075F SYST BP GE 130 - 139MM HG: CPT | Performed by: FAMILY MEDICINE

## 2025-07-16 PROCEDURE — 99214 OFFICE O/P EST MOD 30 MIN: CPT | Performed by: FAMILY MEDICINE

## 2025-07-16 PROCEDURE — 3078F DIAST BP <80 MM HG: CPT | Performed by: FAMILY MEDICINE

## 2025-07-16 SDOH — ECONOMIC STABILITY: FOOD INSECURITY: WITHIN THE PAST 12 MONTHS, THE FOOD YOU BOUGHT JUST DIDN'T LAST AND YOU DIDN'T HAVE MONEY TO GET MORE.: NEVER TRUE

## 2025-07-16 SDOH — ECONOMIC STABILITY: FOOD INSECURITY: WITHIN THE PAST 12 MONTHS, YOU WORRIED THAT YOUR FOOD WOULD RUN OUT BEFORE YOU GOT MONEY TO BUY MORE.: NEVER TRUE

## 2025-07-16 ASSESSMENT — PATIENT HEALTH QUESTIONNAIRE - PHQ9
SUM OF ALL RESPONSES TO PHQ QUESTIONS 1-9: 0
2. FEELING DOWN, DEPRESSED OR HOPELESS: NOT AT ALL
1. LITTLE INTEREST OR PLEASURE IN DOING THINGS: NOT AT ALL
SUM OF ALL RESPONSES TO PHQ QUESTIONS 1-9: 0

## 2025-07-16 NOTE — PROGRESS NOTES
Colleen Qiu (:  1940) is a 85 y.o. female,    here for evaluation of the following chief complaint(s):  Hypertension (Patient is here for 4 month follow up, patient has swelling in her feet. )            Subjective   History of Present Illness  The patient presents for evaluation of bilateral lower extremity edema. She is accompanied by her friend.  Mild patient Lisa Briscoe      She reports feeling generally healthy but has been experiencing leg swelling for the past 5 weeks. The swelling extends to her thighs and pelvis, causing discomfort during walking. She also mentions a seeping spot on her leg. She spends most of her day sitting with her legs elevated, which she finds relieves the heaviness in her legs. She takes Lasix daily but avoids it on days when she plans to go out due to its diuretic effect. She was supposed to get her blood work done before she came here. She does not experience shortness of breath during physical activity. She is currently on prednisone and Lasix, [facility]. Her prednisone dosage was initially 20 mg but has since been reduced to 10 mg. She recalls a period when her hands were immobile, and she was unable to use them, leading to the initiation of prednisone treatment.    She has polycythemia and is under the care of Dr. Miller    Did recently have CAT scans and MRI review them she getting them every 3 months all the studies done show progressive disease.  I did not tell the patient I think her oncologist needs to do that.  I did review that with her caretaker who is here today it is on her record for information.    She received Opdivo infusion for melanoma about 2 weeks ago.    Review of Systems:  Constitutional:  No fever, no fatigue, no chills, no headaches, no weight change  Dermatology:  No rash, no mole, no dry or sensitive skin  ENT:  No cough, no sore throat, no sinus pain, no runny nose, no ear pain  Cardiology: Swelling in

## 2025-07-17 LAB
BASOPHILS ABSOLUTE: 0.01 K/UL (ref 0–0.2)
BASOPHILS RELATIVE PERCENT: 0 % (ref 0–2)
EOSINOPHILS ABSOLUTE: 0.13 K/UL (ref 0.05–0.5)
EOSINOPHILS RELATIVE PERCENT: 1 % (ref 0–6)
HCT VFR BLD CALC: 32.1 % (ref 34–48)
HEMOGLOBIN: 9.8 G/DL (ref 11.5–15.5)
IMMATURE GRANULOCYTES %: 0 % (ref 0–5)
IMMATURE GRANULOCYTES ABSOLUTE: 0.03 K/UL (ref 0–0.58)
LYMPHOCYTES ABSOLUTE: 0.68 K/UL (ref 1.5–4)
LYMPHOCYTES RELATIVE PERCENT: 8 % (ref 20–42)
MCH RBC QN AUTO: 28.7 PG (ref 26–35)
MCHC RBC AUTO-ENTMCNC: 30.5 G/DL (ref 32–34.5)
MCV RBC AUTO: 93.9 FL (ref 80–99.9)
MONOCYTES ABSOLUTE: 0.41 K/UL (ref 0.1–0.95)
MONOCYTES RELATIVE PERCENT: 5 % (ref 2–12)
NEUTROPHILS ABSOLUTE: 7.82 K/UL (ref 1.8–7.3)
NEUTROPHILS RELATIVE PERCENT: 86 % (ref 43–80)
PDW BLD-RTO: 22.5 % (ref 11.5–15)
PLATELET CONFIRMATION: NORMAL
PLATELET, FLUORESCENCE: 150 K/UL (ref 130–450)
PMV BLD AUTO: 12.2 FL (ref 7–12)
RBC # BLD: 3.42 M/UL (ref 3.5–5.5)
RBC # BLD: ABNORMAL 10*6/UL
WBC # BLD: 9.1 K/UL (ref 4.5–11.5)

## 2025-07-21 ENCOUNTER — TELEPHONE (OUTPATIENT)
Dept: PRIMARY CARE CLINIC | Age: 85
End: 2025-07-21

## 2025-07-21 RX ORDER — FUROSEMIDE 20 MG/1
20 TABLET ORAL DAILY
Qty: 30 TABLET | Refills: 0 | Status: SHIPPED | OUTPATIENT
Start: 2025-07-21

## 2025-07-21 NOTE — TELEPHONE ENCOUNTER
Notify patient the blood test for fluid overload is slightly elevated.  Instruct her to continue taking the Lasix 20 mg daily.  I sent a new prescription if she runs out.  She has to take it every day.  Schedule appointment to see me in 3 weeks.  We may have to reduce that because it may dehydrate her too much.  If worsening swelling go to ER

## 2025-07-21 NOTE — TELEPHONE ENCOUNTER
Patient called for lab results.  She states you were going to adjust her diuretic based on results.  Please advise

## 2025-08-12 DIAGNOSIS — R73.03 PRE-DIABETES: Chronic | ICD-10-CM

## 2025-08-12 DIAGNOSIS — E55.9 VITAMIN D DEFICIENCY: ICD-10-CM

## 2025-08-12 DIAGNOSIS — E03.9 ACQUIRED HYPOTHYROIDISM: ICD-10-CM

## 2025-08-12 DIAGNOSIS — E53.8 VITAMIN B 12 DEFICIENCY: ICD-10-CM

## 2025-08-12 DIAGNOSIS — C43.59 MALIGNANT MELANOMA OF TORSO EXCLUDING BREAST (HCC): ICD-10-CM

## 2025-08-12 DIAGNOSIS — I10 ESSENTIAL HYPERTENSION: Chronic | ICD-10-CM

## 2025-08-12 LAB
ALBUMIN: 3.8 G/DL (ref 3.5–5.2)
ALP BLD-CCNC: 164 U/L (ref 35–104)
ALT SERPL-CCNC: 9 U/L (ref 0–35)
ANION GAP SERPL CALCULATED.3IONS-SCNC: 13 MMOL/L (ref 7–16)
AST SERPL-CCNC: 23 U/L (ref 0–35)
BASOPHILS ABSOLUTE: 0.02 K/UL (ref 0–0.2)
BASOPHILS RELATIVE PERCENT: 0 % (ref 0–2)
BILIRUB SERPL-MCNC: 0.8 MG/DL (ref 0–1.2)
BUN BLDV-MCNC: 17 MG/DL (ref 8–23)
CALCIUM SERPL-MCNC: 9.6 MG/DL (ref 8.8–10.2)
CHLORIDE BLD-SCNC: 101 MMOL/L (ref 98–107)
CHOLESTEROL, TOTAL: 169 MG/DL
CO2: 28 MMOL/L (ref 22–29)
CREAT SERPL-MCNC: 0.7 MG/DL (ref 0.5–1)
EOSINOPHILS ABSOLUTE: 0.13 K/UL (ref 0.05–0.5)
EOSINOPHILS RELATIVE PERCENT: 2 % (ref 0–6)
GFR, ESTIMATED: 80 ML/MIN/1.73M2
GLUCOSE BLD-MCNC: 99 MG/DL (ref 74–99)
HBA1C MFR BLD: 5.1 % (ref 4–5.6)
HCT VFR BLD CALC: 37.1 % (ref 34–48)
HDLC SERPL-MCNC: 60 MG/DL
HEMOGLOBIN: 11.4 G/DL (ref 11.5–15.5)
IMMATURE GRANULOCYTES %: 1 % (ref 0–5)
IMMATURE GRANULOCYTES ABSOLUTE: 0.04 K/UL (ref 0–0.58)
LDL CHOLESTEROL: 87 MG/DL
LYMPHOCYTES ABSOLUTE: 0.77 K/UL (ref 1.5–4)
LYMPHOCYTES RELATIVE PERCENT: 9 % (ref 20–42)
MCH RBC QN AUTO: 29.1 PG (ref 26–35)
MCHC RBC AUTO-ENTMCNC: 30.7 G/DL (ref 32–34.5)
MCV RBC AUTO: 94.6 FL (ref 80–99.9)
MONOCYTES ABSOLUTE: 0.48 K/UL (ref 0.1–0.95)
MONOCYTES RELATIVE PERCENT: 5 % (ref 2–12)
NEUTROPHILS ABSOLUTE: 7.37 K/UL (ref 1.8–7.3)
NEUTROPHILS RELATIVE PERCENT: 84 % (ref 43–80)
PDW BLD-RTO: 14.6 % (ref 11.5–15)
PLATELET, FLUORESCENCE: 233 K/UL (ref 130–450)
PMV BLD AUTO: 13.3 FL (ref 7–12)
POTASSIUM SERPL-SCNC: 3.6 MMOL/L (ref 3.5–5.1)
RBC # BLD: 3.92 M/UL (ref 3.5–5.5)
SODIUM BLD-SCNC: 142 MMOL/L (ref 136–145)
THYROXINE (T4): 6.9 UG/DL (ref 4.5–11.7)
TOTAL PROTEIN: 7.2 G/DL (ref 6.4–8.3)
TRIGL SERPL-MCNC: 106 MG/DL
TSH SERPL DL<=0.05 MIU/L-ACNC: 1.9 UIU/ML (ref 0.27–4.2)
VITAMIN B-12: 664 PG/ML (ref 232–1245)
VITAMIN D 25-HYDROXY: 27.4 NG/ML (ref 30–100)
VLDLC SERPL CALC-MCNC: 21 MG/DL
WBC # BLD: 8.8 K/UL (ref 4.5–11.5)

## 2025-08-13 ENCOUNTER — OFFICE VISIT (OUTPATIENT)
Dept: PRIMARY CARE CLINIC | Age: 85
End: 2025-08-13
Payer: MEDICARE

## 2025-08-13 VITALS
BODY MASS INDEX: 29.69 KG/M2 | HEART RATE: 72 BPM | WEIGHT: 142 LBS | TEMPERATURE: 98.4 F | RESPIRATION RATE: 16 BRPM | OXYGEN SATURATION: 96 %

## 2025-08-13 DIAGNOSIS — E78.2 MIXED HYPERLIPIDEMIA: Chronic | ICD-10-CM

## 2025-08-13 DIAGNOSIS — M51.06 INTERVERTEBRAL LUMBAR DISC DISORDER WITH MYELOPATHY, LUMBAR REGION: Chronic | ICD-10-CM

## 2025-08-13 DIAGNOSIS — C43.59 MALIGNANT MELANOMA OF TORSO EXCLUDING BREAST (HCC): ICD-10-CM

## 2025-08-13 DIAGNOSIS — N18.31 STAGE 3A CHRONIC KIDNEY DISEASE (HCC): ICD-10-CM

## 2025-08-13 DIAGNOSIS — I10 ESSENTIAL HYPERTENSION: Primary | Chronic | ICD-10-CM

## 2025-08-13 PROCEDURE — 1123F ACP DISCUSS/DSCN MKR DOCD: CPT | Performed by: FAMILY MEDICINE

## 2025-08-13 PROCEDURE — 99214 OFFICE O/P EST MOD 30 MIN: CPT | Performed by: FAMILY MEDICINE

## 2025-08-29 ENCOUNTER — OFFICE VISIT (OUTPATIENT)
Dept: PODIATRY | Age: 85
End: 2025-08-29

## 2025-08-29 VITALS — WEIGHT: 142 LBS | BODY MASS INDEX: 29.69 KG/M2

## 2025-08-29 DIAGNOSIS — G60.8 HEREDITARY SENSORY NEUROPATHY: ICD-10-CM

## 2025-08-29 DIAGNOSIS — M79.675 PAIN OF LEFT GREAT TOE: ICD-10-CM

## 2025-08-29 DIAGNOSIS — R73.03 PRE-DIABETES: Chronic | ICD-10-CM

## 2025-08-29 DIAGNOSIS — B35.1 TINEA UNGUIUM: Primary | ICD-10-CM

## 2025-08-29 DIAGNOSIS — M79.674 PAIN OF RIGHT GREAT TOE: ICD-10-CM

## (undated) DEVICE — DEFENDO AIR WATER SUCTION AND BIOPSY VALVE KIT FOR  OLYMPUS: Brand: DEFENDO AIR/WATER/SUCTION AND BIOPSY VALVE

## (undated) DEVICE — PROGRASP FORCEPS: Brand: ENDOWRIST

## (undated) DEVICE — NEEDLE HYPO 25GA L1.5IN BLU POLYPR HUB S STL REG BVL STR

## (undated) DEVICE — BLOCK BITE 60FR RUBBER ADLT DENTAL

## (undated) DEVICE — INSUFFLATION TUBING SET WITH FILTER, FUNNEL CONNECTOR AND LUER LOCK: Brand: JOSNOE MEDICAL INC

## (undated) DEVICE — FORCE BIPOLAR: Brand: ENDOWRIST

## (undated) DEVICE — DEVICE GRSP L230CM SHTH DIA2.4MM HYBRID JAW FLX DST WIRE

## (undated) DEVICE — SYSTEM BX CAP BILI RAP EXCHG CAP LOK DEV COMPATIBLE W/ OLY

## (undated) DEVICE — MEDIA CONTRAST ISOVUE 300 100ML

## (undated) DEVICE — PAD, GROUNDING, UNIVERSAL, SPLIT, 9': Brand: MEDLINE

## (undated) DEVICE — Device

## (undated) DEVICE — TIP COVER ACCESSORY

## (undated) DEVICE — GLOVE,SURG,SIGNATURE LTX MICR,LTX,PF,7.0: Brand: MEDLINE

## (undated) DEVICE — MONOPOLAR CURVED SCISSORS: Brand: ENDOWRIST

## (undated) DEVICE — SYSTEM INJ BILI RAP REFIL CONT

## (undated) DEVICE — SPHINCTEROTOME: Brand: JAGTOME RX 39

## (undated) DEVICE — COLUMN DRAPE

## (undated) DEVICE — TOWEL,OR,DSP,ST,BLUE,STD,6/PK,12PK/CS: Brand: MEDLINE

## (undated) DEVICE — BITEBLOCK 54FR W/ DENT RIM BLOX

## (undated) DEVICE — ELECTRODE PT RET AD L9FT HI MOIST COND ADH HYDRGEL CORDED

## (undated) DEVICE — SPONGE GZ W4XL4IN RAYON POLY CVR W/NONWOVEN FAB STRL 2/PK

## (undated) DEVICE — INSUFFLATION NEEDLE TO ESTABLISH PNEUMOPERITONEUM.: Brand: INSUFFLATION NEEDLE

## (undated) DEVICE — KIT,ANTI FOG,W/SPONGE & FLUID,SOFT PACK: Brand: MEDLINE

## (undated) DEVICE — RETRIEVAL BALLOON CATHETER: Brand: EXTRACTOR™ PRO RX

## (undated) DEVICE — CANNULA NSL CO2 SAMPLING AD ORAL W/ O2 DEL CAPNOFLEX

## (undated) DEVICE — DRAPE,LAP,CHOLE,W/TROUGHS,STERILE: Brand: MEDLINE

## (undated) DEVICE — APPLICATOR MEDICATED 26 CC SOLUTION HI LT ORNG CHLORAPREP

## (undated) DEVICE — BLADELESS OBTURATOR: Brand: WECK VISTA

## (undated) DEVICE — SEAL

## (undated) DEVICE — ARM DRAPE

## (undated) DEVICE — CANNULATING SPHINCTEROTOME: Brand: JAGTOME™ REVOLUTION RX

## (undated) DEVICE — GAUZE,SPONGE,4"X4",8PLY,STRL,LF,10/TRAY: Brand: MEDLINE

## (undated) DEVICE — MEDIUM-LARGE CLIP APPLIER: Brand: ENDOWRIST

## (undated) DEVICE — LIQUIBAND RAPID ADHESIVE 36/CS 0.8ML: Brand: MEDLINE

## (undated) DEVICE — WARMER SCP 2 ANTIFOG LAP DISP

## (undated) DEVICE — GRADUATE TRIANG MEASURE 1000ML BLK PRNT

## (undated) DEVICE — SYRINGE 20ML LL S/C 50

## (undated) DEVICE — ANCHOR TISSUE RETRIEVAL SYSTEM, BAG SIZE 125 ML, PORT SIZE 8 MM: Brand: ANCHOR TISSUE RETRIEVAL SYSTEM

## (undated) DEVICE — DOUBLE BASIN SET: Brand: MEDLINE INDUSTRIES, INC.

## (undated) DEVICE — RESCUE COMBO FORCEPS

## (undated) DEVICE — BLADE,STAINLESS-STEEL,11,STRL,DISPOSABLE: Brand: MEDLINE